# Patient Record
Sex: MALE | Race: WHITE | Employment: UNEMPLOYED | URBAN - METROPOLITAN AREA
[De-identification: names, ages, dates, MRNs, and addresses within clinical notes are randomized per-mention and may not be internally consistent; named-entity substitution may affect disease eponyms.]

---

## 2017-03-06 ENCOUNTER — OFFICE VISIT (OUTPATIENT)
Dept: HEMATOLOGY | Age: 62
End: 2017-03-06

## 2017-03-06 ENCOUNTER — HOSPITAL ENCOUNTER (OUTPATIENT)
Dept: LAB | Age: 62
Discharge: HOME | End: 2017-03-06
Payer: MEDICARE

## 2017-03-06 VITALS
OXYGEN SATURATION: 97 % | BODY MASS INDEX: 34.35 KG/M2 | WEIGHT: 245.38 LBS | TEMPERATURE: 98.3 F | DIASTOLIC BLOOD PRESSURE: 81 MMHG | HEART RATE: 84 BPM | SYSTOLIC BLOOD PRESSURE: 125 MMHG | HEIGHT: 71 IN

## 2017-03-06 DIAGNOSIS — B19.20 COMPENSATED HCV CIRRHOSIS (HCC): Primary | ICD-10-CM

## 2017-03-06 DIAGNOSIS — K74.69 COMPENSATED HCV CIRRHOSIS (HCC): Primary | ICD-10-CM

## 2017-03-06 DIAGNOSIS — B19.20 COMPENSATED HCV CIRRHOSIS (HCC): ICD-10-CM

## 2017-03-06 DIAGNOSIS — K74.69 COMPENSATED HCV CIRRHOSIS (HCC): ICD-10-CM

## 2017-03-06 LAB
ALBUMIN SERPL BCP-MCNC: 3.2 G/DL (ref 3.4–5)
ALBUMIN/GLOB SERPL: 0.8 {RATIO} (ref 0.8–1.7)
ALP SERPL-CCNC: 106 U/L (ref 45–117)
ALT SERPL-CCNC: 42 U/L (ref 16–61)
AMMONIA PLAS-SCNC: 100 UMOL/L (ref 11–32)
ANION GAP BLD CALC-SCNC: 8 MMOL/L (ref 3–18)
AST SERPL W P-5'-P-CCNC: 61 U/L (ref 15–37)
BASOPHILS # BLD AUTO: 0 K/UL (ref 0–0.06)
BASOPHILS # BLD: 1 % (ref 0–2)
BILIRUB DIRECT SERPL-MCNC: 0.7 MG/DL (ref 0–0.2)
BILIRUB SERPL-MCNC: 2.4 MG/DL (ref 0.2–1)
BUN SERPL-MCNC: 8 MG/DL (ref 7–18)
BUN/CREAT SERPL: 9 (ref 12–20)
CALCIUM SERPL-MCNC: 8.1 MG/DL (ref 8.5–10.1)
CHLORIDE SERPL-SCNC: 108 MMOL/L (ref 100–108)
CO2 SERPL-SCNC: 25 MMOL/L (ref 21–32)
CREAT SERPL-MCNC: 0.87 MG/DL (ref 0.6–1.3)
DIFFERENTIAL METHOD BLD: ABNORMAL
EOSINOPHIL # BLD: 0.3 K/UL (ref 0–0.4)
EOSINOPHIL NFR BLD: 6 % (ref 0–5)
ERYTHROCYTE [DISTWIDTH] IN BLOOD BY AUTOMATED COUNT: 14.9 % (ref 11.6–14.5)
GLOBULIN SER CALC-MCNC: 3.9 G/DL (ref 2–4)
GLUCOSE SERPL-MCNC: 115 MG/DL (ref 74–99)
HCT VFR BLD AUTO: 45.4 % (ref 36–48)
HGB BLD-MCNC: 16.3 G/DL (ref 13–16)
INR PPP: 1.3 (ref 0.8–1.2)
LYMPHOCYTES # BLD AUTO: 12 % (ref 21–52)
LYMPHOCYTES # BLD: 0.5 K/UL (ref 0.9–3.6)
MCH RBC QN AUTO: 33.9 PG (ref 24–34)
MCHC RBC AUTO-ENTMCNC: 35.9 G/DL (ref 31–37)
MCV RBC AUTO: 94.4 FL (ref 74–97)
MONOCYTES # BLD: 0.5 K/UL (ref 0.05–1.2)
MONOCYTES NFR BLD AUTO: 13 % (ref 3–10)
NEUTS SEG # BLD: 2.9 K/UL (ref 1.8–8)
NEUTS SEG NFR BLD AUTO: 68 % (ref 40–73)
PLATELET # BLD AUTO: 46 K/UL (ref 135–420)
PMV BLD AUTO: 11 FL (ref 9.2–11.8)
POTASSIUM SERPL-SCNC: 3.7 MMOL/L (ref 3.5–5.5)
PROT SERPL-MCNC: 7.1 G/DL (ref 6.4–8.2)
PROTHROMBIN TIME: 15.4 SEC (ref 11.5–15.2)
RBC # BLD AUTO: 4.81 M/UL (ref 4.7–5.5)
RBC MORPH BLD: ABNORMAL
SODIUM SERPL-SCNC: 141 MMOL/L (ref 136–145)
WBC # BLD AUTO: 4.2 K/UL (ref 4.6–13.2)

## 2017-03-06 PROCEDURE — 80048 BASIC METABOLIC PNL TOTAL CA: CPT | Performed by: NURSE PRACTITIONER

## 2017-03-06 PROCEDURE — 85025 COMPLETE CBC W/AUTO DIFF WBC: CPT | Performed by: NURSE PRACTITIONER

## 2017-03-06 PROCEDURE — 36415 COLL VENOUS BLD VENIPUNCTURE: CPT | Performed by: NURSE PRACTITIONER

## 2017-03-06 PROCEDURE — 85610 PROTHROMBIN TIME: CPT | Performed by: NURSE PRACTITIONER

## 2017-03-06 PROCEDURE — 82107 ALPHA-FETOPROTEIN L3: CPT | Performed by: NURSE PRACTITIONER

## 2017-03-06 PROCEDURE — 82140 ASSAY OF AMMONIA: CPT | Performed by: NURSE PRACTITIONER

## 2017-03-06 PROCEDURE — 80076 HEPATIC FUNCTION PANEL: CPT | Performed by: NURSE PRACTITIONER

## 2017-03-06 PROCEDURE — 87521 HEPATITIS C PROBE&RVRS TRNSC: CPT | Performed by: NURSE PRACTITIONER

## 2017-03-06 RX ORDER — SPIRONOLACTONE 100 MG/1
100 TABLET, FILM COATED ORAL DAILY
Qty: 90 TAB | Refills: 4 | Status: SHIPPED | OUTPATIENT
Start: 2017-03-06 | End: 2017-08-24 | Stop reason: SDUPTHER

## 2017-03-06 RX ORDER — FUROSEMIDE 40 MG/1
40 TABLET ORAL DAILY
Qty: 90 TAB | Refills: 4 | Status: SHIPPED | OUTPATIENT
Start: 2017-03-06 | End: 2017-08-24 | Stop reason: SDUPTHER

## 2017-03-06 NOTE — MR AVS SNAPSHOT
Visit Information Date & Time Provider Department Dept. Phone Encounter #  
 3/6/2017 11:30 AM Amy Delford Homans, NP [facility] [place] [facility] [address] [facility] [id number] Follow-up Instructions Return in about 3 months (around 6/6/2017), or if symptoms worsen or fail to improve. Your Appointments 3/6/2017 11:30 AM  
Follow Up with Patricia Roblero NP [facility] [place] [facility] [place] ([facility]) Appt Note: Chronic Liver Disease [address]  
  
   
 [address] [facility] Upcoming Health Maintenance Date Due DTaP/Tdap/Td series (1 - Tdap) 8/18/1977 FOBT Q 1 YEAR AGE 50-75 8/18/2006 INFLUENZA AGE 9 TO ADULT 8/1/2016 ZOSTER VACCINE AGE 60> 8/18/2016 Allergies as of 3/6/2017  Review Complete On: 3/6/2017 By: Leeanna Trejo Severity Noted Reaction Type Reactions Aspirin  12/12/2013    Other (comments) Bleeding Valium [Diazepam]    Swelling Current Immunizations  Reviewed on 10/19/2013 Name Date Influenza Vaccine  Deferred (Patient Refused) Pneumococcal Vaccine (Unspecified Type) 4/1/2013 Not reviewed this visit You Were Diagnosed With   
  
 Codes Comments Compensated HCV cirrhosis ([facility]    -  Primary ICD-10-CM: B19.20, N12.80 ICD-9-CM: 070.54, 571.5 Vitals BP Pulse Temp Height(growth percentile) Weight(growth percentile) SpO2  
 125/81 84 98.3 °F (36.8 °C) (Tympanic) 5' 11\" (1.803 m) 245 lb 6 oz (111.3 kg) 97% BMI Smoking Status 34.22 kg/m2 Current Every Day Smoker Vitals History BMI and BSA Data Body Mass Index Body Surface Area  
 34.22 kg/m 2 2.36 m 2 Preferred Pharmacy Pharmacy Name Phone [facility] [address] [place] [address] [phone] Your Updated Medication List  
  
   
 This list is accurate as of: 3/6/17 11:23 AM.  Always use your most recent med list.  
  
  
  
  
 furosemide 40 mg tablet Commonly known as:  LASIX Take 1 Tab by mouth daily. Indications: Edema OTHER Take 1 Tab by mouth daily as needed. polyethylene glycol 17 gram/dose powder Commonly known as:  Dresden Clos Take 17 g by mouth daily. rifAXIMin 550 mg tablet Commonly known as:  Cady Magic Take 1 Tab by mouth two (2) times a day. selenium sulfide 2.5 % shampoo Commonly known as:  Macho Marbella Use daily as shampoo. spironolactone 100 mg tablet Commonly known as:  ALDACTONE Take 1 Tab by mouth daily. Indications: Edema Prescriptions Sent to Pharmacy Refills  
 furosemide (LASIX) 40 mg tablet 4 Sig: Take 1 Tab by mouth daily. Indications: Edema Class: Normal  
 Pharmacy: [address] Ph #: [phone] Route: Oral  
 spironolactone (ALDACTONE) 100 mg tablet 4 Sig: Take 1 Tab by mouth daily. Indications: Edema Class: Normal  
 Pharmacy: [address] Ph #: [phone] Route: Oral  
  
Follow-up Instructions Return in about 3 months (around 6/6/2017), or if symptoms worsen or fail to improve. To-Do List   
 03/06/2017 Lab:  AFP WITH AFP-L3%   
  
 03/06/2017 Lab:  AMMONIA   
  
 03/06/2017 Lab:  CBC WITH AUTOMATED DIFF   
  
 03/06/2017 Lab:  HCV RNA BY GABRIELE QL,RFLX TO QT   
  
 03/06/2017 Lab:  HEPATIC FUNCTION PANEL   
  
 03/06/2017 Lab:  METABOLIC PANEL, BASIC   
  
 03/06/2017 Lab:  PROTHROMBIN TIME + INR   
  
 03/06/2017 Imaging:  US ABD LTD   
  
 04/05/2017 GI:  EGD Introducing [facility]! Dear Stephen Mi: Thank you for requesting a Webspy account. Our records indicate that you already have an active Webspy account.   You can access your account anytime at [email or web address] Did you know that you can access your hospital and ER discharge instructions at any time in [employer]? You can also review all of your test results from your hospital stay or ER visit. Additional Information If you have questions, please visit the Frequently Asked Questions section of the [employer] website at [email or web address] Remember, [employer] is NOT to be used for urgent needs. For medical emergencies, dial 911. Now available from your iPhone and Android! Please provide this summary of care documentation to your next provider. If you have any questions after today's visit, please call [phone].

## 2017-03-06 NOTE — PROGRESS NOTES
2 Dawn Fowler MD April G Fernie, NP [address] [place], NP        at [address] [facility], [address] Radha Guan  22.     [phone]     FAX: [phone]    at [address], [address] [facility], [address]     [phone]     FAX: [phone]       Patient Care Team:  MD Raymond Mederos MD as Physician (Dermatology)      Problem List  Date Reviewed: 12/5/2016          Codes Class Noted    Portal hypertensive gastropathy ICD-10-CM: K31.89  ICD-9-CM: 537.89  12/22/2013        Cirrhosis ([facility].) ICD-10-CM: K74.60  ICD-9-CM: 571.5  9/5/2011        Upper GI bleed secondary to NSAID induced gastritis - 9/2010 ICD-10-CM: K92.2  ICD-9-CM: 578.9  9/5/2011        S/P lumbar laminectomy with spinal fusion - 1992 ICD-10-CM: Z98.1  ICD-9-CM: V45.4  9/5/2011        Colon polyps ICD-10-CM: K63.5  ICD-9-CM: 211.3  9/5/2011    Overview Signed 9/5/2011 10:38 AM by Jerzy Fowler MD     Last colonoscopy 9/2010             Esophageal varices ([facility].) ICD-10-CM: I85.00  ICD-9-CM: 456.1  9/5/2011        Thrombocytopenia secondary to cirrhosis ICD-10-CM: D69.6  ICD-9-CM: 287.5  9/5/2011        Chronic hepatitis C genotype 1 ICD-10-CM: B18.2  ICD-9-CM: 070.54  Unknown              Mayra Villagran returns to the [address] for monitoring of chronic HCV with cirrhosis. The active problem list, all pertinent past medical history, medications, liver histology, endoscopic studies, radiologic findings and laboratory findings related to the liver disorder were reviewed with the patient. The patient has esophageal varices without bleeding. His previous UGI bleed was from portal gastropathy. He had an EGD in November 2016 that identified small esophageal varices. Mr. Frank Nick is on step 1 diuretics. His peripheral edema is well controlled. He is currently taking Xifaxan.   He could not tolerate lactulose due to diarrhea. He denies any overt confusion. Mr. Archana Yap  participated in the Astral B clinical trial.  Unfortunately he relapsed 12 weeks post treatment. Mr. Archana Yap completed twenty-four weeks of treatment with Vamshi Simpson with good toleration 13 months ago. He remained HCV RNA undetectable one year post treatment and is considered cured of HCV. The patient has not experienced yellowing of the eyes or skin, itching, dark urine. ALLERGIES:  Allergies   Allergen Reactions    Aspirin Other (comments)     Bleeding      Valium [Diazepam] Swelling     Current Outpatient Prescriptions   Medication Sig    rifAXIMin (XIFAXAN) 550 mg tablet Take 1 Tab by mouth two (2) times a day.  polyethylene glycol (MIRALAX) 17 gram/dose powder Take 17 g by mouth daily.  OTHER Take 1 Tab by mouth daily as needed.  furosemide (LASIX) 40 mg tablet Take 40 mg by mouth daily. Indications: EDEMA    spironolactone (ALDACTONE) 100 mg tablet Take 100 mg by mouth daily. Indications: EDEMA    selenium sulfide (SELSUN) 2.5 % shampoo Use daily as shampoo. No current facility-administered medications for this visit. SYSTEM REVIEW NOT RELATED TO LIVER DISEASE OR REVIEWED ABOVE:  Constitution systems: Negative for fever, chills, weight loss. Positive for weight gain. Eyes: Negative for visual changes. ENT: Negative for sore throat, painful swallowing. Respiratory: Negative for cough, hemoptysis, SOB. Cardiology: Negative for chest pain, palpitations. GI:  Negative for constipation or diarrhea. : Negative for urinary frequency, dysuria, hematuria, nocturia. Skin: Negative for rash. Hematology: Negative for easy bruising, blood clots. Musculo-skelatal: Negative for back pain, muscle pain, weakness. Neurologic: Negative for headaches, dizziness, vertigo, memory problems not related to HE. Psychology: Negative for anxiety, depression.      FAMILY/SOCIAL HISTORY:  The patient is . There are 3 children. The patient smokes tobacco. The patient has been abstinent from alcohol since 2007. The patient is currently on disability. PHYSICAL EXAMINATION:    Visit Vitals    /81    Pulse 84    Temp 98.3 °F (36.8 °C) (Tympanic)    Ht 5' 11\" (1.803 m)    Wt 245 lb 6 oz (111.3 kg)    SpO2 97%    BMI 34.22 kg/m2       General: No acute distress. Eyes: Sclera anicteric. ENT: No oral lesions, thyroid normal.  Nodes: No adenopathy. Skin: Spider angiomata on chest, palmar erythema. Respiratory: Lungs clear to auscultation. Cardiovascular: Regular heart rate, no murmurs, no JVD. Abdomen: Soft non-tender, liver enlarged. Spleen not palpable. No obvious ascites. Umbilical hernia present. Extremities: Trace bilateral pedal edema. No muscle wasting. No gross arthritic changes. Neurologic: Alert and oriented, cranial nerves grossly intact, no asterixsis.     LABORATORY STUDIES:    [facility] [place] of [address] [place] [address] & Units 12/5/2016 11/15/2016 9/1/2016   WBC 4.6 - 13.2 K/uL 5.5 4.1 (L) 5.0   ANC 1.8 - 8.0 K/UL 3.9 2.8 3.5   HGB 13.0 - 16.0 g/dL 14.3 15.0 14.8    - 420 K/uL 58 (L) 41 (L) 58 (L)   INR 0.8 - 1.2   1.3 (H)  1.2   AST 15 - 37 U/L 79 (H)  93 (H)   ALT 16 - 61 U/L 46  54   Alk Phos 45 - 117 U/L 100  79   Bili, Total 0.2 - 1.0 MG/DL 2.0 (H)  2.2 (H)   Bili, Direct 0.0 - 0.2 MG/DL 0.7 (H)  0.6 (H)   Albumin 3.4 - 5.0 g/dL 3.0 (L)  3.4   BUN 7.0 - 18 MG/DL 9  12   Creat 0.6 - 1.3 MG/DL 1.14  0.92   Na 136 - 145 mmol/L 141  137   K 3.5 - 5.5 mmol/L 4.5  4.0   Cl 100 - 108 mmol/L 105  103   CO2 21 - 32 mmol/L 29  27   Glucose 74 - 99 mg/dL 105 (H)  89   Ammonia 11 - 32 UMOL/L 66 (H)       [facility] Latest Ref Rng & Units 6/15/2016   WBC 4.6 - 13.2 K/uL 4.2 (L)   ANC 1.8 - 8.0 K/UL 3.0   HGB 13.0 - 16.0 g/dL 14.8    - 420 K/uL 43 (L)   INR 0.8 - 1.2   1.4 (H)   AST 15 - 37 U/L 50 (H)   ALT 16 - 61 U/L 42   Alk Phos 45 - 117 U/L 86 Bili, Total 0.2 - 1.0 MG/DL 2.4 (H)   Bili, Direct 0.0 - 0.2 MG/DL 0.5 (H)   Albumin 3.4 - 5.0 g/dL 3.2 (L)   BUN 7.0 - 18 MG/DL 10   Creat 0.6 - 1.3 MG/DL 0.89   Na 136 - 145 mmol/L 143   K 3.5 - 5.5 mmol/L 4.3   Cl 100 - 108 mmol/L 109 (H)   CO2 21 - 32 mmol/L 26   Glucose 74 - 99 mg/dL 95   Ammonia 11 - 32 UMOL/L 73 (H)       SEROLOGIES:  12/10:  HAV total positive, HB surface antigen negative, anti-HB surface negative, HCV genotype 1, HCV RNA log 5.5,    7/2011: Ferritin 73, iron saturation 57%. LIVER HISTOLOGY:  Not performed    ENDOSCOPIC PROCEDURES:  9/2010:  Colonoscopy while hospitalized in [facility] - reported to demonstrate colonic polyps. 9/2010:  EGD while hospitalized in [facility] - reported to demonstrate severe gastritis from NSAIDs with small esophageal varices. 2/2011: EGD by [facility]. Esophageal varicies. Banding performed. 11/2011:  EGD by [facility]. Severe portal gastropathy. No varices. 4/2012:  EGD by [facility]:  Grade 2 esophageal varices. Banding performed. Severe hypertensive portal gastropathy. 8/2012:  EGD per [facility]. Small esophageal varices. Severe portal hypertensive gastropathy, gastritis of the antrum, no gastric varices. Repeat in 6 months. 10/2013. EGD by Dr Rozina Singer. Small varices with stigmata of bleeding. Portal gastropathy. 07/2014:  EGD per [facility]. 2 medium esophageal varices. Banding performed. Moderate portal hypertensive gastropathy. Repeat in one year. 9/1/2015: EGD per [facility]. Duodenal ulcer present. H. Pylori negative. Portal hypertensive gastropathy. Esophageal varices present. Four bands placed. Repeat in 6 months. 11/2015:  EGD per Dr. Jaci Nyhan. Moderate portal gastropathy. Grade 1 esophageal varices. No gastric varices noted. 11/2015:  Colonoscopy per Dr. Jaci Nyhan. Mild diverticulosis. 11/2016:  EGD per Dr. Nadia Laws. Small esophageal varices. Severe hypertensive portal gastropathy. Repeat in 6 months. RADIOLOGY:  11/2010: Ultrasound of the liver. Echogenic consistent with cirrhosis. No liver mass lesions. No ascites. 2/2011: Ultrasound of the liver. Changes consistent with cirrhosis. No liver mass lesions. No ascites. No portal vein thrombosis. 11/2011: Ultrasound of the liver. Echogenic consistent with chronic liver disease, cirrhosis. No liver mass lesions. No ascites. 04/12:  CT of abdomen and pelvis - hepatic cirrhosis with portal hypertension with scattered upper abdominal collateralzation and varices. Likely recanalization of the umbilical artery. No mass noted. 1/2015:  Ultrasound of liver. Echogenic consistent with cirrhosis. No liver mass lesions. No dilated bile ducts. No ascites. 8/2015:  CT of the abdomen and pelvis. Coarsened, micronodular surface of the liver with widening of the interlobar fissure. No intrahepatic biliary ductal dilation. Stable enlargement of the portal vein with recanalization of the umbilical vein. Paraesophageal varices. Grossly stable splenomegaly  1/2016:  Ultrasound of liver. Echogenic consistent with cirrhosis. No liver mass lesions. No dilated bile ducts. No ascites. 11/2016:  Ultrasound of the liver. Echogenic consistent with cirrhosis. No liver mass lesions. No dilated bile ducts. No ascites. OTHER TESTING:  Not performed    ASSESSMENT AND PLAN:    1. Chronic HCV, genotype 1 with cirrhosis. CBC, BMP, hepatic panel, and PT/INR ordered today. 2. Liver transmaminses are elevated. Liver function is depressed. The platelet count is depressed. 3. HCV treatment. Mr. Susan Lyons completed twenty-four weeks of treatment with Ines Chill with good toleration 13 months ago. He remained HCV RNA undetectable one year post treatment. 4. Upper GI bleed appears to have been due to portal gastropathy. 5. Hepatic encephalopathy. I have ordered an ammonia level today. The patient was instructed not to operate a motor vehicle because of HE which poses an increased risk for MVA.       6. Lower extremity. Continue step 1 diuretics. I have spoken to him about the importance of salt restrictions. 7. The patient was counseled regarding alcohol consumption. 8. Vaccination for viral hepatitis B is recommended since the patient has no serologic evidence of previous exposure or vaccination with immunity. 9. Vaccination for viral hepatitis A is not required. The patient has serologic evidence of prior exposure or vaccination with immunity. 10. [address] screening will be performed. AFP ordered today. His next abdominal ultrasound is due in June 2017. 11. Thrombocytopenia secondary to cirrhosis from chronic HCV. No treatment necessary. 12. Anemia is probably from chronic GI blood loss from portal hypertension and iron deficiency. 15.  Screening for varices. EGD performed in 11/2016 positive for small varices. I have ordered a repeat EGD with MAC today. [facility] [address] in 12 weeks. 15. All of the above issues were discussed with the patient. All questions were answered. The patient expressed a clear understanding of the above. 12. Dr. Rodri Mcqueen was available during this visit.        Rosamaria Marcano, MEJIA  [facility] [place] [address], [address] [place] [address], [address] [place] [address] Street [address] [phone]

## 2017-03-08 LAB
AFP L3 MFR SERPL: 6.1 % (ref 0–9.9)
AFP SERPL-MCNC: 8 NG/ML (ref 0–8)
HCV RNA SERPL QL NAA+PROBE: NEGATIVE

## 2017-03-20 ENCOUNTER — HOSPITAL ENCOUNTER (OUTPATIENT)
Dept: ULTRASOUND IMAGING | Age: 62
Discharge: HOME | End: 2017-03-20
Attending: NURSE PRACTITIONER
Payer: MEDICARE

## 2017-03-20 DIAGNOSIS — B19.20 COMPENSATED HCV CIRRHOSIS (HCC): ICD-10-CM

## 2017-03-20 DIAGNOSIS — K74.69 COMPENSATED HCV CIRRHOSIS (HCC): ICD-10-CM

## 2017-03-20 PROCEDURE — 76705 ECHO EXAM OF ABDOMEN: CPT

## 2017-03-21 ENCOUNTER — DOCUMENTATION ONLY (OUTPATIENT)
Dept: HEMATOLOGY | Age: 62
End: 2017-03-21

## 2017-03-21 NOTE — PROGRESS NOTES
Attempted to call with ultrasound results. Message states voicemail box not set up. Ultrasound is stable without ascites or mass. F/U as scheduled.

## 2017-04-25 ENCOUNTER — ANESTHESIA EVENT (OUTPATIENT)
Dept: ENDOSCOPY | Age: 62
End: 2017-04-25
Payer: MEDICARE

## 2017-04-25 ENCOUNTER — ANESTHESIA (OUTPATIENT)
Dept: ENDOSCOPY | Age: 62
End: 2017-04-25
Payer: MEDICARE

## 2017-04-25 ENCOUNTER — HOSPITAL ENCOUNTER (OUTPATIENT)
Age: 62
Setting detail: OUTPATIENT SURGERY
Discharge: HOME | End: 2017-04-25
Attending: INTERNAL MEDICINE | Admitting: INTERNAL MEDICINE
Payer: MEDICARE

## 2017-04-25 VITALS
RESPIRATION RATE: 16 BRPM | OXYGEN SATURATION: 99 % | WEIGHT: 253.6 LBS | TEMPERATURE: 98.6 F | SYSTOLIC BLOOD PRESSURE: 107 MMHG | HEIGHT: 71 IN | BODY MASS INDEX: 35.5 KG/M2 | DIASTOLIC BLOOD PRESSURE: 69 MMHG | HEART RATE: 71 BPM

## 2017-04-25 LAB
BASOPHILS # BLD AUTO: 0 K/UL (ref 0–0.06)
BASOPHILS # BLD: 1 % (ref 0–2)
DIFFERENTIAL METHOD BLD: ABNORMAL
EOSINOPHIL # BLD: 0.2 K/UL (ref 0–0.4)
EOSINOPHIL NFR BLD: 4 % (ref 0–5)
ERYTHROCYTE [DISTWIDTH] IN BLOOD BY AUTOMATED COUNT: 15.1 % (ref 11.6–14.5)
HCT VFR BLD AUTO: 41.9 % (ref 36–48)
HGB BLD-MCNC: 15 G/DL (ref 13–16)
LYMPHOCYTES # BLD AUTO: 13 % (ref 21–52)
LYMPHOCYTES # BLD: 0.6 K/UL (ref 0.9–3.6)
MCH RBC QN AUTO: 33.8 PG (ref 24–34)
MCHC RBC AUTO-ENTMCNC: 35.8 G/DL (ref 31–37)
MCV RBC AUTO: 94.4 FL (ref 74–97)
MONOCYTES # BLD: 0.5 K/UL (ref 0.05–1.2)
MONOCYTES NFR BLD AUTO: 11 % (ref 3–10)
NEUTS SEG # BLD: 3.5 K/UL (ref 1.8–8)
NEUTS SEG NFR BLD AUTO: 71 % (ref 40–73)
PLATELET # BLD AUTO: 45 K/UL (ref 135–420)
PLATELET COMMENTS,PCOM: ABNORMAL
PMV BLD AUTO: 10 FL (ref 9.2–11.8)
RBC # BLD AUTO: 4.44 M/UL (ref 4.7–5.5)
RBC MORPH BLD: ABNORMAL
WBC # BLD AUTO: 4.8 K/UL (ref 4.6–13.2)

## 2017-04-25 PROCEDURE — 74011250636 HC RX REV CODE- 250/636

## 2017-04-25 PROCEDURE — 76040000019: Performed by: INTERNAL MEDICINE

## 2017-04-25 PROCEDURE — 74011000250 HC RX REV CODE- 250: Performed by: INTERNAL MEDICINE

## 2017-04-25 PROCEDURE — 74011250636 HC RX REV CODE- 250/636: Performed by: INTERNAL MEDICINE

## 2017-04-25 PROCEDURE — 85025 COMPLETE CBC W/AUTO DIFF WBC: CPT | Performed by: SPECIALIST

## 2017-04-25 PROCEDURE — 36415 COLL VENOUS BLD VENIPUNCTURE: CPT | Performed by: SPECIALIST

## 2017-04-25 PROCEDURE — 76060000031 HC ANESTHESIA FIRST 0.5 HR: Performed by: INTERNAL MEDICINE

## 2017-04-25 RX ORDER — DEXTROMETHORPHAN/PSEUDOEPHED 2.5-7.5/.8
1.2 DROPS ORAL
Status: CANCELLED | OUTPATIENT
Start: 2017-04-25

## 2017-04-25 RX ORDER — SODIUM CHLORIDE 9 MG/ML
125 INJECTION, SOLUTION INTRAVENOUS CONTINUOUS
Status: DISCONTINUED | OUTPATIENT
Start: 2017-04-25 | End: 2017-04-25 | Stop reason: HOSPADM

## 2017-04-25 RX ORDER — MIDAZOLAM HYDROCHLORIDE 1 MG/ML
.25-5 INJECTION, SOLUTION INTRAMUSCULAR; INTRAVENOUS
Status: CANCELLED | OUTPATIENT
Start: 2017-04-25 | End: 2017-04-25

## 2017-04-25 RX ORDER — FENTANYL CITRATE 50 UG/ML
100 INJECTION, SOLUTION INTRAMUSCULAR; INTRAVENOUS
Status: CANCELLED | OUTPATIENT
Start: 2017-04-25 | End: 2017-04-25

## 2017-04-25 RX ORDER — FLUMAZENIL 0.1 MG/ML
0.2 INJECTION INTRAVENOUS
Status: CANCELLED | OUTPATIENT
Start: 2017-04-25 | End: 2017-04-25

## 2017-04-25 RX ORDER — SODIUM CHLORIDE 0.9 % (FLUSH) 0.9 %
5-10 SYRINGE (ML) INJECTION EVERY 8 HOURS
Status: CANCELLED | OUTPATIENT
Start: 2017-04-25 | End: 2017-04-25

## 2017-04-25 RX ORDER — ATROPINE SULFATE 0.1 MG/ML
0.5 INJECTION INTRAVENOUS
Status: CANCELLED | OUTPATIENT
Start: 2017-04-25 | End: 2017-04-25

## 2017-04-25 RX ORDER — EPINEPHRINE 0.1 MG/ML
1 INJECTION INTRACARDIAC; INTRAVENOUS
Status: CANCELLED | OUTPATIENT
Start: 2017-04-25 | End: 2017-04-25

## 2017-04-25 RX ORDER — LIDOCAINE HYDROCHLORIDE 20 MG/ML
INJECTION, SOLUTION EPIDURAL; INFILTRATION; INTRACAUDAL; PERINEURAL AS NEEDED
Status: DISCONTINUED | OUTPATIENT
Start: 2017-04-25 | End: 2017-04-25 | Stop reason: HOSPADM

## 2017-04-25 RX ORDER — DIPHENHYDRAMINE HYDROCHLORIDE 50 MG/ML
50 INJECTION, SOLUTION INTRAMUSCULAR; INTRAVENOUS ONCE
Status: CANCELLED | OUTPATIENT
Start: 2017-04-25 | End: 2017-04-25

## 2017-04-25 RX ORDER — SODIUM CHLORIDE 0.9 % (FLUSH) 0.9 %
5-10 SYRINGE (ML) INJECTION AS NEEDED
Status: CANCELLED | OUTPATIENT
Start: 2017-04-25 | End: 2017-04-25

## 2017-04-25 RX ORDER — PROPRANOLOL HYDROCHLORIDE 10 MG/1
10 TABLET ORAL 2 TIMES DAILY
Qty: 60 TAB | Refills: 3 | Status: SHIPPED | OUTPATIENT
Start: 2017-04-25 | End: 2018-07-24

## 2017-04-25 RX ORDER — NALOXONE HYDROCHLORIDE 0.4 MG/ML
0.4 INJECTION, SOLUTION INTRAMUSCULAR; INTRAVENOUS; SUBCUTANEOUS
Status: CANCELLED | OUTPATIENT
Start: 2017-04-25 | End: 2017-04-25

## 2017-04-25 RX ORDER — PROPOFOL 10 MG/ML
INJECTION, EMULSION INTRAVENOUS AS NEEDED
Status: DISCONTINUED | OUTPATIENT
Start: 2017-04-25 | End: 2017-04-25 | Stop reason: HOSPADM

## 2017-04-25 RX ADMIN — PROPOFOL 140 MG: 10 INJECTION, EMULSION INTRAVENOUS at 11:50

## 2017-04-25 RX ADMIN — LIDOCAINE HYDROCHLORIDE 100 MG: 20 INJECTION, SOLUTION EPIDURAL; INFILTRATION; INTRACAUDAL; PERINEURAL at 11:47

## 2017-04-25 RX ADMIN — SODIUM CHLORIDE 125 ML/HR: 900 INJECTION, SOLUTION INTRAVENOUS at 10:57

## 2017-04-25 RX ADMIN — PROPOFOL 30 MG: 10 INJECTION, EMULSION INTRAVENOUS at 11:53

## 2017-04-25 NOTE — DISCHARGE INSTRUCTIONS
DISCHARGE SUMMARY from Nurse    The following personal items are in your possession at time of discharge:    Dental Appliances: None        Home Medications: None  Jewelry: None  Clothing: Shirt, Undergarments, Footwear, Pants, Jacket/Coat, Socks (locker 15)  Other Valuables:  (states Dennie Loveless has his wallet)             PATIENT INSTRUCTIONS:    After general anesthesia or intravenous sedation, for 24 hours or while taking prescription Narcotics:  · Limit your activities  · Do not drive and operate hazardous machinery  · Do not make important personal or business decisions  · Do  not drink alcoholic beverages  · If you have not urinated within 8 hours after discharge, please contact your surgeon on call. Report the following to your surgeon:  · Excessive pain, swelling, redness or odor of or around the surgical area  · Temperature over 100.5  · Nausea and vomiting lasting longer than 4 hours or if unable to take medications  · Any signs of decreased circulation or nerve impairment to extremity: change in color, persistent  numbness, tingling, coldness or increase pain  · Any questions        What to do at Home:  Return to office as scheduled    If you experience any of the following symptoms heavy bleeding, difficulty swallowing, fevers, severe pain, please follow up with dr Naman Villa. *  Please give a list of your current medications to your Primary Care Provider. *  Please update this list whenever your medications are discontinued, doses are      changed, or new medications (including over-the-counter products) are added. *  Please carry medication information at all times in case of emergency situations. These are general instructions for a healthy lifestyle:    No smoking/ No tobacco products/ Avoid exposure to second hand smoke    Surgeon General's Warning:  Quitting smoking now greatly reduces serious risk to your health.     Obesity, smoking, and sedentary lifestyle greatly increases your risk for illness    A healthy diet, regular physical exercise & weight monitoring are important for maintaining a healthy lifestyle    You may be retaining fluid if you have a history of heart failure or if you experience any of the following symptoms:  Weight gain of 3 pounds or more overnight or 5 pounds in a week, increased swelling in our hands or feet or shortness of breath while lying flat in bed. Please call your doctor as soon as you notice any of these symptoms; do not wait until your next office visit. Recognize signs and symptoms of STROKE:    F-face looks uneven    A-arms unable to move or move unevenly    S-speech slurred or non-existent    T-time-call 911 as soon as signs and symptoms begin-DO NOT go       Back to bed or wait to see if you get better-TIME IS BRAIN. Warning Signs of HEART ATTACK     Call 911 if you have these symptoms:   Chest discomfort. Most heart attacks involve discomfort in the center of the chest that lasts more than a few minutes, or that goes away and comes back. It can feel like uncomfortable pressure, squeezing, fullness, or pain.  Discomfort in other areas of the upper body. Symptoms can include pain or discomfort in one or both arms, the back, neck, jaw, or stomach.  Shortness of breath with or without chest discomfort.  Other signs may include breaking out in a cold sweat, nausea, or lightheadedness. Don't wait more than five minutes to call 911 - MINUTES MATTER! Fast action can save your life. Calling 911 is almost always the fastest way to get lifesaving treatment. Emergency Medical Services staff can begin treatment when they arrive -- up to an hour sooner than if someone gets to the hospital by car. The discharge information has been reviewed with the patient and caregiver. The patient and caregiver verbalized understanding.     Discharge medications reviewed with the patient and caregiver and appropriate educational materials and side effects teaching were provided. [facility] Sparks MD, Ro Banks, [facility]     April MEJIA Harding PA-C Rachell Bollman, MD, MD Rosamaria Cruz NP Melene Ide, NP Laan [facility], [address] [facility], [address]     [phone]     FAX: [address] [place] [address], [address] [place] [address], [address]     [phone]     FAX: [phone]           ENDOSCOPY DISCHARGE INSTRUCTIONS      Socorro Joseph  1955  Date: 4/25/2017    DISCOMFORT:  Use lozenges or warm salt water gargle for sore thoat  Apply warm compress to IV site if red. If redness or soreness persists call the office. You may experience gas and bloating. Walking and belching will help relieve this. You may experience chest discomfort or pressure especially if banding of esophageal varices was performed. DIET:  You may resume your normal diet. ACTIVITY:  Spend the remainder of the day resting. Avoid any strenuous activity. You may not operate a vehicle for 12 hours. You may not engage in an occupation involving machinery or appliances for rest of today. Avoid making any critical or financial decisions for at least 24 hour. Call the [address] [facility] if you have any of the following:  Increasing chest or abdominal pain, nausea, vomiting, vomiting blood, abdominal distension or swelling, fever or chills, bloody discharge from nose or mouth or shortness of breath. Follow-up Instructions:  Call Dr. Tiana Montaño for any questions or problems at the phone number listed above. If a biopsy was performed, you will be contacted by the office staff or Dr Tiana Montaño within 1 week. If you have not heard from us by then you may call the office at the phone number listed above to inquire about the results.     ENDOSCOPY FINDINGS:  Your endoscopy demonstrated esophageal varices and stomach redness and swelling typical of cirrhosis. DISCHARGE SUMMARY from the Nurse: The following personal items collected during your admission are returned to you:   Dental Appliance: Dental Appliances: None  Vision:    Hearing Aid:    Jewelry: Jewelry: None  Clothing: Clothing: Shirt, Undergarments, Footwear, Pants, Jacket/Coat, Socks (locker 15)  Other Valuables:  Other Valuables:  (states zuhair has his wallet)  Valuables sent to safe:        Patient armband removed and shredded

## 2017-04-25 NOTE — H&P
PRE-PROCEDURE NOTE - EGD    H and P from last office visit reviewed. Allergies reviewed. Out-patient medicaton list reviewed. Patient Active Problem List   Diagnosis Code    Chronic hepatitis C genotype 1 B18.2    [facility] K74.60    Upper GI bleed secondary to NSAID induced gastritis - 9/2010 K92.2    S/P lumbar laminectomy with spinal fusion - 1992 Z98.1    Colon polyps K63.5    Esophageal varices (H[facility]) I85.00    Thrombocytopenia secondary to cirrhosis D69.6    Portal hypertensive gastropathy K31.89       EGD to assess for esophageal varices. The risks of the procedure were discussed with the patient. These included reaction to anesthesia, pain, perforation and bleeding. All questions were answered. The patient wishes to proceed with the procedure. PHYSICAL EXAMINATION:  VS: per nursing note  General: No acute distress. Eyes: Sclera anicteric. ENT: No oral lesions. Thyroid normal.  Nodes: No adenopathy. Skin: No spider angiomata. No jaundice. No palmar erythema. Respiratory: Lungs clear to auscultation. Cardiovascular: Regular heart rate. No murmurs. No JVD. Abdomen: Soft non-tender, liver size normal to percussion/palpation. Spleen not palpable. No obvious ascites. Extremities: No edema. No muscle wasting. No gross arthritic changes. Neurologic: Alert and oriented. Cranial nerves grossly intact. No asterixis.       MOST RECENT LABORATORY STUDIES:  Lab Results   Component Value Date/Time    WBC 4.8 04/25/2017 10:43 AM    WBC 10.2 06/20/2012 12:00 AM    HGB 15.0 04/25/2017 10:43 AM    HCT 41.9 04/25/2017 10:43 AM    PLATELET 45 30/86/4605 10:43 AM    MCV 94.4 04/25/2017 10:43 AM     Lab Results   Component Value Date/Time    INR 1.3 03/06/2017 11:46 AM    INR 1.3 12/05/2016 02:04 PM    INR 1.2 09/01/2016 04:03 PM    Prothrombin time 15.4 03/06/2017 11:46 AM    Prothrombin time 15.7 12/05/2016 02:04 PM    Prothrombin time 14.8 09/01/2016 04:03 PM       ASSESSMENT AND PLAN:  EGD to assess for esophageal varices. Conscious sedation with fentanyl and versed.           Radha Harrison MD  [facility] [place] of [address], [address], [address]  [phone]

## 2017-04-25 NOTE — PROCEDURES
UPPER ENDOSCOPY PROCEDURE NOTE    Jailene Mancia  1955    INDICATION: Cirrhosis. Screening for esophageal varices with variceal ligation if  indicated. : Radha Harrison MD    ASSISTANT: NONE    ANESTHESIA/SEDATION: Per MAC ANESTHESIA    PROCEDURE DESCRIPTION:  Inf[employer] consent was obtained from the patient for the procedure. All risks and benefits of the procedure explained. The patient was taken to the endoscopy suite and placed in the left lateral decubitus position. Following sequential administration of sedation to doses as indicated above the endoscope was inserted into the mouth and advanced under direct vision to the second portion of the duodenum. Careful inspection of upper gastrointestinal tract was made as the endoscope was inserted and withdrawn. Retroflexion of the endoscope to view of the cardia of the stomach was performed. The findings and interventions are described below. FINDINGS:  Esophagus:  Small esophageal varices. No banding performed. Mild distal esophagitis. Stomach: Severe portal hypertensive gastropathy of the entire stomach. No gastric varices identified. Duodenum: Normal bulb and second portion    INTERVENTION: None. COMPLICATIONS: None. The patient tolerated the procedure well. EBL: Negligible. RECOMMENDATIONS:  Observe until discharge parameters are achieved. May resume previous diet. Repeat endoscopy to reassess varices and need for banding in 1 year. Begin propranolol 10 mg twice a day for prevention of bleeding from varices. Follow-up [facility] office as scheduled.       Radha Harrison MD  4/25/2017  11:55 AM

## 2017-04-25 NOTE — IP AVS SNAPSHOT
Ashia Feng 
 
 
 [address] [place] [id number] 
[phone] Patient: Agusto Zamora MRN: [id number] Tosin Sepulveda You are allergic to the following Allergen Reactions Aspirin Other (comments) Bleeding Valium (Diazepam) Swelling Recent Documentation Height Weight BMI Smoking Status 1.803 m 115 kg 35.37 kg/m2 Current Every Day Smoker Unresulted Labs Order Current Status CBC WITH AUTOMATED DIFF Preliminary result Emergency Contacts Name Discharge Info Relation Home Work Mobile Erick Grimes CAREGIVER [3] Daughter [21]   [phone] Srinath Benson DISCHARGE CAREGIVER [3] Other Relative [6] [phone] About your hospitalization You were admitted on:  April 25, 2017 [facility] were discharged on:  April 25, 2017 Unit phone number:  [phone] Why you were hospitalized Your primary diagnosis was:  Not on File Providers Seen During Your Hospitalizations Provider Role Specialty Primary office phone Cathy Sanchez MD Attending Provider Hepatology [phone] Your Primary Care Physician (PCP) Primary Care Physician Office Phone Office Fax Phyllis Villanueva [phone] Follow-up Information Follow up With Details Comments Contact Info Bob Aguilera MD   [address] [place] [address] 
[phone] Current Discharge Medication List  
  
START taking these medications Dose & Instructions Dispensing Information Comments Morning Noon Evening Bedtime  
 propranolol 10 mg tablet Commonly known as:  INDERAL Your last dose was: Your next dose is:    
   
   
 Dose:  10 mg Take 1 Tab by mouth two (2) times a day. Quantity:  60 Tab Refills:  3 CONTINUE these medications which have NOT CHANGED Dose & Instructions Dispensing Information Comments Morning Noon Evening Bedtime  
 furosemide 40 mg tablet Commonly known as:  LASIX Your last dose was: Your next dose is:    
   
   
 Dose:  40 mg Take 1 Tab by mouth daily. Indications: Edema Quantity:  90 Tab Refills:  4 OTHER Your last dose was: Your next dose is:    
   
   
 Dose:  1 Tab Take 1 Tab by mouth daily as needed. Refills:  0  
     
   
   
   
  
 polyethylene glycol 17 gram/dose powder Commonly known as:  Meridee Rouge Your last dose was: Your next dose is:    
   
   
 Dose:  17 g Take 17 g by mouth daily. Quantity:  850 g Refills:  11  
     
   
   
   
  
 rifAXIMin 550 mg tablet Commonly known as:  Gwynneth Ro Your last dose was: Your next dose is:    
   
   
 Dose:  550 mg Take 1 Tab by mouth two (2) times a day. Quantity:  60 Tab Refills:  11  
     
   
   
   
  
 selenium sulfide 2.5 % shampoo Commonly known as:  Pearla Onaway Your last dose was: Your next dose is:    
   
   
 Use daily as shampoo. Quantity:  1 Bottle Refills:  12  
     
   
   
   
  
 spironolactone 100 mg tablet Commonly known as:  ALDACTONE Your last dose was: Your next dose is:    
   
   
 Dose:  100 mg Take 1 Tab by mouth daily. Indications: Edema Quantity:  90 Tab Refills:  4 Where to Get Your Medications These medications were sent to [address] [place] [address] Phone:  [phone]  
  propranolol 10 mg tablet Discharge Instructions DISCHARGE SUMMARY from Nurse The following personal items are in your possession at time of discharge: 
 
Dental Appliances: None Home Medications: None Jewelry: None Clothing: Shirt, Undergarments, Footwear, Pants, Jacket/Coat, Socks (locker 15) Other Valuables:  (states zuhair has his wallet) PATIENT INSTRUCTIONS: 
 
 
F-face looks uneven A-arms unable to move or move unevenly S-speech slurred or non-existent T-time-call 911 as soon as signs and symptoms begin-DO NOT go Back to bed or wait to see if you get better-TIME IS BRAIN. Warning Signs of HEART ATTACK Call 911 if you have these symptoms: 
? Chest discomfort. Most heart attacks involve discomfort in the center of the chest that lasts more than a few minutes, or that goes away and comes back. It can feel like uncomfortable pressure, squeezing, fullness, or pain. ? Discomfort in other areas of the upper body. Symptoms can include pain or discomfort in one or both arms, the back, neck, jaw, or stomach. ? Shortness of breath with or without chest discomfort. ? Other signs may include breaking out in a cold sweat, nausea, or lightheadedness. Don't wait more than five minutes to call [address]! Fast action can save your life. Calling 911 is almost always the fastest way to get lifesaving treatment. Emergency Medical Services staff can begin treatment when they arrive  up to an hour sooner than if someone gets to the hospital by car. The discharge information has been reviewed with the patient and caregiver. The patient and caregiver verbalized understanding. Discharge medications reviewed with the patient and caregiver and appropriate educational materials and side effects teaching were provided. [address] [place] [address] [address]    Cathy Sanchez MD, [facility] 
 Rueben Lefevre, NP Jodi Favre, PA-C Tommi Core, MD, MD Rosamaria Martin NP Laurice Perry, NP [address] [facility], [address] Radha Palacios  22. 
   [phone] FAX: [address], [address] [facility], [address] [place] [address] - [address] 
   [phone] FAX: [phone] ENDOSCOPY DISCHARGE INSTRUCTIONS Marvin Marin 1955 Date: 4/25/2017 DISCOMFORT: 
Use lozenges or warm salt water gargle for sore thoat Apply warm compress to IV site if red. If redness or soreness persists call the office. You may experience gas and bloating. Walking and belching will help relieve this. You may experience chest discomfort or pressure especially if banding of esophageal varices was performed. DIET: 
You may resume your normal diet. ACTIVITY: 
Spend the remainder of the day resting. Avoid any strenuous activity. You may not operate a vehicle for 12 hours. You may not engage in an occupation involving machinery or appliances for rest of today. Avoid making any critical or financial decisions for at least 24 hour. Call the [facility] [phone] if you have any of the following: 
Increasing chest or abdominal pain, nausea, vomiting, vomiting blood, abdominal distension or swelling, fever or chills, bloody discharge from nose or mouth or shortness of breath. Follow-up Instructions: 
Call Dr. Reilly Tompkins for any questions or problems at the phone number listed above. If a biopsy was performed, you will be contacted by the office staff or Dr Reilly Tompkins within 1 week. If you have not heard from us by then you may call the office at the phone number listed above to inquire about the results. ENDOSCOPY FINDINGS: 
Your endoscopy demonstrated esophageal varices and stomach redness and swelling typical of cirrhosis. DISCHARGE SUMMARY from the Nurse: The following personal items collected during your admission are returned to you:  
Dental Appliance: Dental Appliances: None Vision:   
Hearing Aid:   
Jewelry: Jewelry: None Clothing: Clothing: Shirt, Undergarments, Footwear, Pants, Jacket/Coat, Socks (locker 15) Other Valuables: Other Valuables:  (states zuhair has his wallet) Valuables sent to safe:   
 
 
Patient armband removed and shredded Discharge Orders None Introducing [facility]! Dear Anastacia Araujo: Thank you for requesting a [employer] account. Our records indicate that you already have an active [employer] account. You can access your account anytime at [email or web address] Did you know that you can access your hospital and ER discharge instructions at any time in [employer]? You can also review all of your test results from your hospital stay or ER visit. Additional Information If you have questions, please visit the Frequently Asked Questions section of the [employer] website at [email or web address] Remember, [employer] is NOT to be used for urgent needs. For medical emergencies, dial 911. Now available from your iPhone and Android! General Information Please provide this summary of care documentation to your next provider. Patient Signature:  ____________________________________________________________ Date:  ____________________________________________________________  
  
Antwan Sapp Provider Signature:  ____________________________________________________________ Date:  ____________________________________________________________

## 2017-04-25 NOTE — ANESTHESIA PREPROCEDURE EVALUATION
Anesthetic History   No history of anesthetic complications     Pertinent negatives: No PONV       Review of Systems / Medical History  Patient summary reviewed, nursing notes reviewed and pertinent labs reviewed    Pulmonary          Smoker (1/4 ppd , Did smoke today, was not told not to. )    Pertinent negatives: No COPD and asthma     Neuro/Psych   Within defined limits           Cardiovascular  Within defined limits              Pertinent negatives: No hypertension, past MI and CAD       GI/Hepatic/Renal     GERD: well controlled      Liver disease  Pertinent negatives: No renal disease   Endo/Other  Within defined limits           Other Findings   Comments: Chronic thrombocytopenia    -etoh           Physical Exam    Airway  Mallampati: II  TM Distance: 4 - 6 cm  Neck ROM: decreased range of motion   Mouth opening: Normal     Cardiovascular    Rhythm: regular  Rate: normal         Dental         Pulmonary  Breath sounds clear to auscultation               Abdominal         Other Findings            Anesthetic Plan    ASA: 4  Anesthesia type: MAC          Induction: Intravenous  Anesthetic plan and risks discussed with: Patient

## 2017-04-27 NOTE — ANESTHESIA POSTPROCEDURE EVALUATION
Post-Anesthesia Evaluation and Assessment    Cardiovascular Function/Vital Signs  Visit Vitals    /69    Pulse 71    Temp 37 °C (98.6 °F)    Resp 16    Ht 5' 11\" (1.803 m)    Wt 115 kg (253 lb 9.6 oz)    SpO2 99%    BMI 35.37 kg/m2       Patient is status post Procedure(s):  EGD [facility] ANESTHESIA. Nausea/Vomiting: Controlled. Postoperative hydration reviewed and adequate. Pain:  Pain Scale 1: Numeric (0 - 10) (04/25/17 1259)  Pain Intensity 1: 0 (04/25/17 1259)   Managed. Neurological Status:   Neuro (WDL): Within Defined Limits (04/25/17 1043)   At baseline. Mental Status and Level of Consciousness: Arousable. Pulmonary Status:   O2 Device: Room air (04/25/17 1208)   Adequate oxygenation and airway patent. Complications related to anesthesia: None    Post-anesthesia assessment completed. No concerns. Patient has met all discharge requirements.     Signed By: Dalia Sanchez MD    April 27, 2017

## 2017-08-24 RX ORDER — SPIRONOLACTONE 100 MG/1
100 TABLET, FILM COATED ORAL DAILY
Qty: 90 TAB | Refills: 4 | Status: SHIPPED | OUTPATIENT
Start: 2017-08-24 | End: 2018-08-15 | Stop reason: SDUPTHER

## 2017-08-24 RX ORDER — FUROSEMIDE 40 MG/1
40 TABLET ORAL DAILY
Qty: 90 TAB | Refills: 4 | Status: SHIPPED | OUTPATIENT
Start: 2017-08-24 | End: 2018-07-24 | Stop reason: SDUPTHER

## 2017-10-30 RX ORDER — SELENIUM SULFIDE 22.5 MG/ML
SHAMPOO TOPICAL
Qty: 1 BOTTLE | Refills: 2 | OUTPATIENT
Start: 2017-10-30

## 2017-12-04 RX ORDER — SELENIUM SULFIDE 22.5 MG/ML
1 SHAMPOO TOPICAL DAILY
Qty: 1 BOTTLE | Refills: 12 | Status: SHIPPED | OUTPATIENT
Start: 2017-12-04 | End: 2018-07-24

## 2018-07-24 ENCOUNTER — HOSPITAL ENCOUNTER (OUTPATIENT)
Dept: LAB | Age: 63
Discharge: HOME | End: 2018-07-24
Payer: MEDICARE

## 2018-07-24 ENCOUNTER — OFFICE VISIT (OUTPATIENT)
Dept: HEMATOLOGY | Age: 63
End: 2018-07-24

## 2018-07-24 VITALS
OXYGEN SATURATION: 97 % | BODY MASS INDEX: 34.75 KG/M2 | HEIGHT: 71 IN | WEIGHT: 248.2 LBS | TEMPERATURE: 97.8 F | SYSTOLIC BLOOD PRESSURE: 125 MMHG | HEART RATE: 99 BPM | RESPIRATION RATE: 20 BRPM | DIASTOLIC BLOOD PRESSURE: 67 MMHG

## 2018-07-24 DIAGNOSIS — K74.60 CIRRHOSIS OF LIVER WITHOUT ASCITES, UNSPECIFIED HEPATIC CIRRHOSIS TYPE (HCC): ICD-10-CM

## 2018-07-24 DIAGNOSIS — K74.60 CIRRHOSIS OF LIVER WITHOUT ASCITES, UNSPECIFIED HEPATIC CIRRHOSIS TYPE (HCC): Primary | ICD-10-CM

## 2018-07-24 PROBLEM — L03.90 CELLULITIS: Status: ACTIVE | Noted: 2018-07-24

## 2018-07-24 LAB
ALBUMIN SERPL-MCNC: 3.2 G/DL (ref 3.4–5)
ALBUMIN/GLOB SERPL: 0.7 {RATIO} (ref 0.8–1.7)
ALP SERPL-CCNC: 154 U/L (ref 45–117)
ALT SERPL-CCNC: 100 U/L (ref 16–61)
AMMONIA PLAS-SCNC: 55 UMOL/L (ref 11–32)
ANION GAP SERPL CALC-SCNC: 6 MMOL/L (ref 3–18)
AST SERPL-CCNC: 134 U/L (ref 15–37)
BASOPHILS # BLD: 0.1 K/UL (ref 0–0.1)
BASOPHILS NFR BLD: 1 % (ref 0–2)
BILIRUB DIRECT SERPL-MCNC: 0.6 MG/DL (ref 0–0.2)
BILIRUB SERPL-MCNC: 1.7 MG/DL (ref 0.2–1)
BUN SERPL-MCNC: 18 MG/DL (ref 7–18)
BUN/CREAT SERPL: 15 (ref 12–20)
CALCIUM SERPL-MCNC: 10 MG/DL (ref 8.5–10.1)
CHLORIDE SERPL-SCNC: 95 MMOL/L (ref 100–108)
CO2 SERPL-SCNC: 27 MMOL/L (ref 21–32)
CREAT SERPL-MCNC: 1.22 MG/DL (ref 0.6–1.3)
DIFFERENTIAL METHOD BLD: ABNORMAL
EOSINOPHIL # BLD: 0.3 K/UL (ref 0–0.4)
EOSINOPHIL NFR BLD: 3 % (ref 0–5)
ERYTHROCYTE [DISTWIDTH] IN BLOOD BY AUTOMATED COUNT: 15 % (ref 11.6–14.5)
GLOBULIN SER CALC-MCNC: 4.7 G/DL (ref 2–4)
GLUCOSE SERPL-MCNC: 75 MG/DL (ref 74–99)
HCT VFR BLD AUTO: 41.3 % (ref 36–48)
HGB BLD-MCNC: 14.6 G/DL (ref 13–16)
INR PPP: 1.3 (ref 0.8–1.2)
LYMPHOCYTES # BLD: 0.7 K/UL (ref 0.9–3.6)
LYMPHOCYTES NFR BLD: 8 % (ref 21–52)
MCH RBC QN AUTO: 35.2 PG (ref 24–34)
MCHC RBC AUTO-ENTMCNC: 35.4 G/DL (ref 31–37)
MCV RBC AUTO: 99.5 FL (ref 74–97)
MONOCYTES # BLD: 1.3 K/UL (ref 0.05–1.2)
MONOCYTES NFR BLD: 13 % (ref 3–10)
NEUTS SEG # BLD: 7 K/UL (ref 1.8–8)
NEUTS SEG NFR BLD: 75 % (ref 40–73)
PLATELET # BLD AUTO: 102 K/UL (ref 135–420)
PMV BLD AUTO: 10.3 FL (ref 9.2–11.8)
POTASSIUM SERPL-SCNC: 4.7 MMOL/L (ref 3.5–5.5)
PROT SERPL-MCNC: 7.9 G/DL (ref 6.4–8.2)
PROTHROMBIN TIME: 15.8 SEC (ref 11.5–15.2)
RBC # BLD AUTO: 4.15 M/UL (ref 4.7–5.5)
SODIUM SERPL-SCNC: 128 MMOL/L (ref 136–145)
WBC # BLD AUTO: 9.4 K/UL (ref 4.6–13.2)

## 2018-07-24 PROCEDURE — 85610 PROTHROMBIN TIME: CPT | Performed by: NURSE PRACTITIONER

## 2018-07-24 PROCEDURE — 80076 HEPATIC FUNCTION PANEL: CPT | Performed by: NURSE PRACTITIONER

## 2018-07-24 PROCEDURE — 82107 ALPHA-FETOPROTEIN L3: CPT | Performed by: NURSE PRACTITIONER

## 2018-07-24 PROCEDURE — 80048 BASIC METABOLIC PNL TOTAL CA: CPT | Performed by: NURSE PRACTITIONER

## 2018-07-24 PROCEDURE — 85025 COMPLETE CBC W/AUTO DIFF WBC: CPT | Performed by: NURSE PRACTITIONER

## 2018-07-24 PROCEDURE — 87521 HEPATITIS C PROBE&RVRS TRNSC: CPT | Performed by: NURSE PRACTITIONER

## 2018-07-24 PROCEDURE — 36415 COLL VENOUS BLD VENIPUNCTURE: CPT | Performed by: NURSE PRACTITIONER

## 2018-07-24 PROCEDURE — 82140 ASSAY OF AMMONIA: CPT | Performed by: NURSE PRACTITIONER

## 2018-07-24 RX ORDER — CLINDAMYCIN HYDROCHLORIDE 300 MG/1
300 CAPSULE ORAL
COMMUNITY
Start: 2018-07-23 | End: 2018-08-02

## 2018-07-24 RX ORDER — FUROSEMIDE 40 MG/1
80 TABLET ORAL DAILY
Qty: 180 TAB | Refills: 3 | Status: SHIPPED | OUTPATIENT
Start: 2018-07-24 | End: 2018-08-15 | Stop reason: SDUPTHER

## 2018-07-24 RX ORDER — LACTULOSE 10 G/15ML
20 SOLUTION ORAL; RECTAL 3 TIMES DAILY
COMMUNITY
End: 2020-08-19 | Stop reason: DRUGHIGH

## 2018-07-24 RX ORDER — CLINDAMYCIN HYDROCHLORIDE 300 MG/1
CAPSULE ORAL
COMMUNITY
Start: 2018-07-23 | End: 2018-07-24 | Stop reason: SDUPTHER

## 2018-07-24 NOTE — PROGRESS NOTES
70 Nolberto Maloney MD, [address], [place], [place]       MEJIA Arrieta PA-C Suan Goes, [facility]   MEJIA Lewis NP [address] [facility] [address]    at [address], [address] Radha Guan  22.    [phone]    FAX: [address] [place] [address], [address] [place] [address],[address]    [address] [facility] Street: [phone]       Patient Care Team:  Annika Brown MD as PCP - General (Internal Medicine)  Marylen Portugal, MD Darina Schwalbe, MD as Physician (Dermatology)      Problem List  Date Reviewed: 7/24/2018          Codes Class Noted    Cellulitis ICD-10-CM: L03.90  ICD-9-CM: 682.9  7/24/2018        Portal hypertensive gastropathy ([facility] [address] ICD-10-CM: K76.6, K31.89  ICD-9-CM: 572.3, 537.89  12/22/2013        Cirrhosis ([facility] [address] ICD-10-CM: K74.60  ICD-9-CM: 571.5  9/5/2011        Upper GI bleed secondary to NSAID induced gastritis - 9/2010 ICD-10-CM: K92.2  ICD-9-CM: 578.9  9/5/2011        S/P lumbar laminectomy with spinal fusion - 1992 ICD-10-CM: Z98.1  ICD-9-CM: V45.4  9/5/2011        Colon polyps ICD-10-CM: K63.5  ICD-9-CM: 211.3  9/5/2011    Overview Signed 9/5/2011 10:38 AM by Tiara Rousseau MD     Last colonoscopy 9/2010             Esophageal varices ([facility] [address] ICD-10-CM: I85.00  ICD-9-CM: 456.1  9/5/2011        Thrombocytopenia secondary to cirrhosis ICD-10-CM: D69.6  ICD-9-CM: 287.5  9/5/2011        Chronic hepatitis C genotype 1 ICD-10-CM: B18.2  ICD-9-CM: 070.54  Unknown              Abilio Chandler [facility] chronic HCV with cirrhosis.   The active problem list, all pertinent past medical history, medications, liver histology, endoscopic studies, radiologic findings and laboratory findings related to the liver disorder were reviewed with the patient. The patient has not been seen here since 03/2017. The patient reports that he was recently hospitalized at [facility] for a \"leg infection\". He was treated with IV antibiotics for cellulitis. The patient has esophageal varices without bleeding. His previous UGI bleed was from portal gastropathy. He had an EGD in November 2016 that identified small esophageal varices. Mr. Teodora Amaya is on step 1 diuretics. His peripheral edema is well controlled. He is currently taking Xifaxan. He could not tolerate lactulose due to diarrhea. He denies any overt confusion. Mr. Teodora Amaya  participated in the Astral B clinical trial.  Unfortunately he relapsed 12 weeks post treatment. Mr. Teodora Amaya completed twenty-four weeks of treatment with Meléndez Frank with good toleration 13 months ago. He remained HCV RNA undetectable one year post treatment and is considered cured of HCV. The patient has not experienced yellowing of the eyes or skin, itching, dark urine. ALLERGIES:  Allergies   Allergen Reactions    Aspirin Other (comments)     Bleeding      Valium [Diazepam] Swelling     Current Outpatient Prescriptions   Medication Sig    clindamycin (CLEOCIN) 300 mg capsule Take 300 mg by mouth.  lactulose (CHRONULAC) 10 gram/15 mL solution Take 20 g by mouth three (3) times daily. Indications: Hepatic Encephalopathy    furosemide (LASIX) 40 mg tablet Take 1 Tab by mouth daily. Indications: Edema (Patient taking differently: Take 80 mg by mouth daily. Indications: Edema)    spironolactone (ALDACTONE) 100 mg tablet Take 1 Tab by mouth daily. Indications: Edema (Patient taking differently: Take 200 mg by mouth daily. Indications: Edema)     No current facility-administered medications for this visit.       SYSTEM REVIEW NOT RELATED TO LIVER DISEASE OR REVIEWED ABOVE:  Constitution systems: Negative for fever, chills, weight loss. Positive for weight gain. Eyes: Negative for visual changes. ENT: Negative for sore throat, painful swallowing. Respiratory: Negative for cough, hemoptysis, SOB. Cardiology: Negative for chest pain, palpitations. GI:  Negative for constipation or diarrhea. : Negative for urinary frequency, dysuria, hematuria, nocturia. Skin: Negative for rash. Hematology: Negative for easy bruising, blood clots. Musculo-skelatal: Negative for back pain, muscle pain, weakness. Neurologic: Negative for headaches, dizziness, vertigo, memory problems not related to HE. Psychology: Negative for anxiety, depression. FAMILY/SOCIAL HISTORY:  The patient is . There are 3 children. The patient smokes tobacco. The patient has been abstinent from alcohol since 2007. The patient is currently on disability. PHYSICAL EXAMINATION:    Visit Vitals    /67 (BP 1 Location: Left arm, BP Patient Position: Sitting)    Pulse 99    Temp 97.8 °F (36.6 °C) (Tympanic)    Resp 20    Ht 5' 11\" (1.803 m)    Wt 248 lb 3.2 oz (112.6 kg)    SpO2 97%    BMI 34.62 kg/m2       General: No acute distress. Eyes: Sclera anicteric. ENT: No oral lesions, thyroid normal.  Nodes: No adenopathy. Skin: Spider angiomata on chest, palmar erythema. Respiratory: Lungs clear to auscultation. Cardiovascular: Regular heart rate, no murmurs, no JVD. Abdomen: Soft non-tender, liver enlarged. Spleen not palpable. No obvious ascites. Umbilical hernia present. Extremities: Trace bilateral pedal edema. No muscle wasting. No gross arthritic changes. Neurologic: Alert and oriented, cranial nerves grossly intact, no asterixsis.     LABORATORY STUDIES:  [facility] [address] [place] [address] 7/24/2018   WBC 4.6 - 13.2 K/uL 9.4   ANC 1.8 - 8.0 K/UL 7.0   HGB 13.0 - 16.0 g/dL 14.6    - 420 K/uL 102 (L)   INR 0.8 - 1.2   1.3 (H)   AST 15 - 37 U/L 134 (H)   ALT 16 - 61 U/L 100 (H)   Alk Phos 45 - 117 U/L 154 (H)   Bili, Total 0.2 - 1.0 MG/DL 1.7 (H)   Bili, Direct 0.0 - 0.2 MG/DL 0.6 (H)   Albumin 3.4 - 5.0 g/dL 3.2 (L)   BUN 7.0 - 18 MG/DL 18   Creat 0.6 - 1.3 MG/DL 1.22   Na 136 - 145 mmol/L 128 (L)   K 3.5 - 5.5 mmol/L 4.7   Cl 100 - 108 mmol/L 95 (L)   CO2 21 - 32 mmol/L 27   Glucose 74 - 99 mg/dL 75   Ammonia 11 - 32 UMOL/L 55 (H)     Cancer Screening Latest Ref Rng & Units 7/24/2018  3/6/2017   AFP, Serum 0.0 - 8.0 ng/mL 8.1 (H)  8.0   AFP-L3% 0.0 - 9.9 % 7.0  6.1       SEROLOGIES:  12/10:  HAV total positive, HB surface antigen negative, anti-HB surface negative, HCV genotype 1, HCV RNA log 5.5,    7/2011: Ferritin 73, iron saturation 57%. LIVER HISTOLOGY:  Not performed    ENDOSCOPIC PROCEDURES:  9/2010:  Colonoscopy while hospitalized in [facility] - reported to demonstrate colonic polyps. 9/2010:  EGD while hospitalized in [facility] - reported to demonstrate severe gastritis from NSAIDs with small esophageal varices. 2/2011: EGD by [facility]. Esophageal varicies. Banding performed. 11/2011:  EGD by [facility]. Severe portal gastropathy. No varices. 4/2012:  EGD by [facility]:  Grade 2 esophageal varices. Banding performed. Severe hypertensive portal gastropathy. 8/2012:  EGD per [facility]. Small esophageal varices. Severe portal hypertensive gastropathy, gastritis of the antrum, no gastric varices. Repeat in 6 months. 10/2013. EGD by Dr Gunner Connor. Small varices with stigmata of bleeding. Portal gastropathy. 07/2014:  EGD per [facility]. 2 medium esophageal varices. Banding performed. Moderate portal hypertensive gastropathy. Repeat in one year. 9/1/2015: EGD per [facility]. Duodenal ulcer present. H. Pylori negative. Portal hypertensive gastropathy. Esophageal varices present. Four bands placed. Repeat in 6 months. 11/2015:  EGD per Dr. Kasia Lopez. Moderate portal gastropathy. Grade 1 esophageal varices. No gastric varices noted. 11/2015:  Colonoscopy per Dr. Kasia Lopez. Mild diverticulosis.    11/2016: EGD per Dr. Nan Dash. Small esophageal varices. Severe hypertensive portal gastropathy. Repeat in 6 months. 01/2018. EGD by Dr. Kobe Garzon. No report available. RADIOLOGY:  11/2010: Ultrasound of the liver. Echogenic consistent with cirrhosis. No liver mass lesions. No ascites. 2/2011: Ultrasound of the liver. Changes consistent with cirrhosis. No liver mass lesions. No ascites. No portal vein thrombosis. 11/2011: Ultrasound of the liver. Echogenic consistent with chronic liver disease, cirrhosis. No liver mass lesions. No ascites. 04/12:  CT of abdomen and pelvis - hepatic cirrhosis with portal hypertension with scattered upper abdominal collateralzation and varices. Likely recanalization of the umbilical artery. No mass noted. 1/2015:  Ultrasound of liver. Echogenic consistent with cirrhosis. No liver mass lesions. No dilated bile ducts. No ascites. 8/2015:  CT of the abdomen and pelvis. Coarsened, micronodular surface of the liver with widening of the interlobar fissure. No intrahepatic biliary ductal dilation. Stable enlargement of the portal vein with recanalization of the umbilical vein. Paraesophageal varices. Grossly stable splenomegaly  1/2016:  Ultrasound of liver. Echogenic consistent with cirrhosis. No liver mass lesions. No dilated bile ducts. No ascites. 11/2016:  Ultrasound of the liver. Echogenic consistent with cirrhosis. No liver mass lesions. No dilated bile ducts. No ascites. 05/2018. Abdominal ultrasound. Coarsened hepatic echogenicity consistent with hepatic parenchymal disease. No focal lesions or biliary dilatation demonstrated. 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 The HCV has been treated and cured.       The most recent laboratory studies indicate that the liver transaminases are elevated, alkaline phosphatase is elevated, tests of hepatic synthetic and metabolic function are depressed, and the platelet count is depressed. HCV treatment. Mr. Susan Lyons completed twenty-four weeks of treatment with [place] with good toleration in 2015. He remained HCV RNA undetectable one year post treatment. HCV RNA still pending. Upper GI bleed appears to have been due to portal gastropathy. Repeat EGD with MAC was ordered. Screening for varices. Hepatic encephalopathy. I have ordered an ammonia level today. The patient was instructed not to operate a motor vehicle because of HE which poses an increased risk for MVA. Ammonia level elevated on current labs at 55. Lower extremity. The patient is currently on step 2 diuretics. Continue this dose. I have spoken to him about the importance of salt restrictions. The patient was counseled regarding alcohol consumption. Vaccination for viral hepatitis B is recommended since the patient has no serologic evidence of previous exposure or vaccination with immunity. Vaccination for viral hepatitis A is not required. The patient has serologic evidence of prior exposure or vaccination with immunity. [address] screening will be performed. AFP ordered today. Abdominal ultrasound ordered. This will be performed with shear wave elastography. Elastography  can assess liver fibrosis and determine if a patient has advanced fibrosis or cirrhosis without the need for liver biopsy. Thrombocytopenia secondary to cirrhosis from chronic HCV. No treatment necessary. [facility] 87 in 4 weeks. All of the above issues were discussed with the patient. All questions were answered. The patient expressed a clear understanding of the above. 40 minutes total time spent with this patient with more than 50% of this time spent counseling and coordinating care as described above.      Neal Kim, JOSEFA-C  [facility] [place] [address] [place] [address],[address]  [address] [place] [address], [address] [place] [address] [phone]

## 2018-07-24 NOTE — PROGRESS NOTES
1. Have you been to the ER, urgent care clinic since your last visit? Hospitalized since your last visit? Yes When: 7/2018 [facility], Infection in leg. Reason for visit: Infection in leg. 2. Have you seen or consulted any other health care providers outside of the [address] [place] [address] since your last visit? Include any pap smears or colon screening. Yes When: 7/2018 Pcp Visit.

## 2018-07-24 NOTE — MR AVS SNAPSHOT
[address] [address] [address] [address] 
[phone] Patient: Lisa Hewitt MRN: [id number] Jailene Amaral Visit Information Date & Time Provider Department Dept. Phone Encounter #  
 7/24/2018 10:30 AM MEJIA [address] of  [address] [id number] Follow-up Instructions Return in about 6 weeks (around 9/4/2018). Your Appointments 9/5/2018 10:30 AM  
Follow Up with Kulwant Denton NP [address] [facility] ([address]) Appt Note: f/u  
 [address], [address] [facility] [address]  
[phone]  
  
   
 [address]  
  
    
 10/15/2018  8:00 AM  
PROCEDURE with Kiran Cornelius MD  
[address] [facility] ([address]) Appt Note: EGD  
 [address], [address] [facility] [place]  
  
   
 1200 [facility], [address] [facility] Upcoming Health Maintenance Date Due DTaP/Tdap/Td series (1 - Tdap) 8/18/1977 FOBT Q 1 YEAR AGE 50-75 8/18/2006 ZOSTER VACCINE AGE 60> 6/18/2016 MEDICARE YEARLY EXAM 3/14/2018 Influenza Age 5 to Adult 8/1/2018 Allergies as of 7/24/2018  Review Complete On: 7/24/2018 By: Usman Lynch Severity Noted Reaction Type Reactions Aspirin  12/12/2013    Other (comments) Bleeding Valium [Diazepam]    Swelling Current Immunizations  Reviewed on 10/19/2013 Name Date Influenza Vaccine  Deferred (Patient Refused) Pneumococcal Vaccine (Unspecified Type) 4/1/2013 Not reviewed this visit You Were Diagnosed With   
  
 Codes Comments Cirrhosis of liver without ascites, unspecified hepatic cirrhosis type ([facility]    -  Primary ICD-10-CM: K74.60 ICD-9-CM: 571.5 Vitals BP Pulse Temp Resp Height(growth percentile) 125/67 (BP 1 Location: Left arm, BP Patient Position: Sitting) 99 97.8 °F (36.6 °C) (Tympanic) 20 5' 11\" (1.803 m) Weight(growth percentile) SpO2 BMI Smoking Status 248 lb 3.2 oz (112.6 kg) 97% 34.62 kg/m2 Current Every Day Smoker BMI and BSA Data Body Mass Index Body Surface Area  
 34.62 kg/m 2 2.38 m 2 Preferred Pharmacy Pharmacy Name Phone [id number], [address] [address] [phone] Your Updated Medication List  
  
   
This list is accurate as of 7/24/18 11:21 AM.  Always use your most recent med list.  
  
  
  
  
 clindamycin 300 mg capsule Commonly known as:  CLEOCIN Take 300 mg by mouth. furosemide 40 mg tablet Commonly known as:  LASIX Take 2 Tabs by mouth daily. Indications: Edema  
  
 lactulose 10 gram/15 mL solution Commonly known as:  Sultana Elena Take 20 g by mouth three (3) times daily. Indications: Hepatic Encephalopathy  
  
 spironolactone 100 mg tablet Commonly known as:  ALDACTONE Take 1 Tab by mouth daily. Indications: Edema Prescriptions Sent to Pharmacy Refills  
 furosemide (LASIX) 40 mg tablet 3 Sig: Take 2 Tabs by mouth daily. Indications: Edema Class: Normal  
 Pharmacy: [id number] - [address] [facility] [address] Ph #: [phone] Route: Oral  
  
Follow-up Instructions Return in about 6 weeks (around 9/4/2018). To-Do List   
 07/24/2018 Imaging:  US ABD LTD W ELASTOGRAPHY   
  
 08/23/2018 GI:  EGD Introducing \[facility]\"" & HEALTH SERVICES! Dear Rafi Spore: Thank you for requesting a [employer] account. Our records indicate that you already have an active [employer] account. You can access your account anytime at [email or web address] Did you know that you can access your hospital and ER discharge instructions at any time in [employer]? You can also review all of your test results from your hospital stay or ER visit. Additional Information If you have questions, please visit the Frequently Asked Questions section of the [employer] website at [email or web address] Remember, [employer] is NOT to be used for urgent needs. For medical emergencies, dial 911. Now available from your iPhone and Android! Please provide this summary of care documentation to your next provider. Your primary care clinician is listed as Meagan Wright. If you have any questions after today's visit, please call [phone].

## 2018-07-25 ENCOUNTER — TELEPHONE (OUTPATIENT)
Dept: HEMATOLOGY | Age: 63
End: 2018-07-25

## 2018-07-25 LAB
AFP L3 MFR SERPL: 7 % (ref 0–9.9)
AFP SERPL-MCNC: 8.1 NG/ML (ref 0–8)

## 2018-07-25 NOTE — TELEPHONE ENCOUNTER
Patient would like for you to give him a call. He stated that his Pcp told him today from the look of his lab results he has liver failure.

## 2018-07-26 ENCOUNTER — TELEPHONE (OUTPATIENT)
Dept: HEMATOLOGY | Age: 63
End: 2018-07-26

## 2018-07-26 NOTE — TELEPHONE ENCOUNTER
Patient states he recently had lab work done at his PCP and they told him he had liver failure. Informed patient he has Cirrhosis according to his chart, Chronic liver damage can lead to liver failure.      Informed pt we will call him when his lab results come back on Monday!!    Patient seems to be worried

## 2018-07-27 LAB
HCV RNA SERPL NAA+PROBE-LOG IU: NOT DETECTED HCV LOG 10IU/ML
HCV RNA SERPL PROBE AMP-ACNC: NOT DETECTED HCVIU/ML
HCV RNA SERPL QL NAA+PROBE: NOT DETECTED

## 2018-07-30 ENCOUNTER — HOSPITAL ENCOUNTER (OUTPATIENT)
Dept: ULTRASOUND IMAGING | Age: 63
Discharge: HOME | End: 2018-07-30
Payer: MEDICARE

## 2018-07-30 DIAGNOSIS — K74.60 CIRRHOSIS OF LIVER WITHOUT ASCITES, UNSPECIFIED HEPATIC CIRRHOSIS TYPE (HCC): ICD-10-CM

## 2018-07-30 PROCEDURE — 0346T US ABD LTD W ELASTOGRAPHY: CPT

## 2018-08-15 RX ORDER — SPIRONOLACTONE 100 MG/1
200 TABLET, FILM COATED ORAL DAILY
Qty: 30 TAB | Refills: 5 | Status: SHIPPED | OUTPATIENT
Start: 2018-08-15 | End: 2018-09-05

## 2018-08-15 RX ORDER — FUROSEMIDE 40 MG/1
80 TABLET ORAL DAILY
Qty: 180 TAB | Refills: 5 | Status: SHIPPED | OUTPATIENT
Start: 2018-08-15 | End: 2018-09-05 | Stop reason: SDUPTHER

## 2018-09-05 ENCOUNTER — HOSPITAL ENCOUNTER (OUTPATIENT)
Dept: LAB | Age: 63
Discharge: HOME | End: 2018-09-05
Payer: MEDICARE

## 2018-09-05 ENCOUNTER — OFFICE VISIT (OUTPATIENT)
Dept: HEMATOLOGY | Age: 63
End: 2018-09-05

## 2018-09-05 VITALS
HEART RATE: 82 BPM | SYSTOLIC BLOOD PRESSURE: 110 MMHG | BODY MASS INDEX: 33.18 KG/M2 | WEIGHT: 237 LBS | HEIGHT: 71 IN | TEMPERATURE: 98.5 F | OXYGEN SATURATION: 97 % | DIASTOLIC BLOOD PRESSURE: 67 MMHG | RESPIRATION RATE: 16 BRPM

## 2018-09-05 DIAGNOSIS — K74.60 CIRRHOSIS OF LIVER WITHOUT ASCITES, UNSPECIFIED HEPATIC CIRRHOSIS TYPE (HCC): Primary | ICD-10-CM

## 2018-09-05 DIAGNOSIS — K74.60 CIRRHOSIS OF LIVER WITHOUT ASCITES, UNSPECIFIED HEPATIC CIRRHOSIS TYPE (HCC): ICD-10-CM

## 2018-09-05 DIAGNOSIS — K76.0 FATTY (CHANGE OF) LIVER, NOT ELSEWHERE CLASSIFIED: ICD-10-CM

## 2018-09-05 LAB
ALBUMIN SERPL-MCNC: 3.1 G/DL (ref 3.4–5)
ALBUMIN/GLOB SERPL: 0.8 {RATIO} (ref 0.8–1.7)
ALP SERPL-CCNC: 105 U/L (ref 45–117)
ALT SERPL-CCNC: 50 U/L (ref 16–61)
AMMONIA PLAS-SCNC: 127 UMOL/L (ref 11–32)
ANION GAP SERPL CALC-SCNC: 6 MMOL/L (ref 3–18)
AST SERPL-CCNC: 77 U/L (ref 15–37)
BASOPHILS # BLD: 0 K/UL (ref 0–0.1)
BASOPHILS NFR BLD: 1 % (ref 0–2)
BILIRUB DIRECT SERPL-MCNC: 1 MG/DL (ref 0–0.2)
BILIRUB SERPL-MCNC: 3.1 MG/DL (ref 0.2–1)
BUN SERPL-MCNC: 10 MG/DL (ref 7–18)
BUN/CREAT SERPL: 10 (ref 12–20)
CALCIUM SERPL-MCNC: 8.4 MG/DL (ref 8.5–10.1)
CHLORIDE SERPL-SCNC: 101 MMOL/L (ref 100–108)
CHOLEST SERPL-MCNC: 232 MG/DL
CO2 SERPL-SCNC: 28 MMOL/L (ref 21–32)
CREAT SERPL-MCNC: 1.04 MG/DL (ref 0.6–1.3)
DIFFERENTIAL METHOD BLD: ABNORMAL
EOSINOPHIL # BLD: 0.2 K/UL (ref 0–0.4)
EOSINOPHIL NFR BLD: 4 % (ref 0–5)
ERYTHROCYTE [DISTWIDTH] IN BLOOD BY AUTOMATED COUNT: 13.9 % (ref 11.6–14.5)
GLOBULIN SER CALC-MCNC: 4.1 G/DL (ref 2–4)
GLUCOSE SERPL-MCNC: 116 MG/DL (ref 74–99)
HCT VFR BLD AUTO: 40.1 % (ref 36–48)
HDLC SERPL-MCNC: 65 MG/DL (ref 40–60)
HDLC SERPL: 3.6 {RATIO} (ref 0–5)
HGB BLD-MCNC: 14.4 G/DL (ref 13–16)
INR PPP: 1.3 (ref 0.8–1.2)
LDLC SERPL CALC-MCNC: 140.4 MG/DL (ref 0–100)
LIPID PROFILE,FLP: ABNORMAL
LYMPHOCYTES # BLD: 0.6 K/UL (ref 0.9–3.6)
LYMPHOCYTES NFR BLD: 10 % (ref 21–52)
MCH RBC QN AUTO: 33.8 PG (ref 24–34)
MCHC RBC AUTO-ENTMCNC: 35.9 G/DL (ref 31–37)
MCV RBC AUTO: 94.1 FL (ref 74–97)
MONOCYTES # BLD: 0.6 K/UL (ref 0.05–1.2)
MONOCYTES NFR BLD: 10 % (ref 3–10)
NEUTS SEG # BLD: 4.4 K/UL (ref 1.8–8)
NEUTS SEG NFR BLD: 75 % (ref 40–73)
PLATELET # BLD AUTO: 52 K/UL (ref 135–420)
PMV BLD AUTO: 9.7 FL (ref 9.2–11.8)
POTASSIUM SERPL-SCNC: 3.7 MMOL/L (ref 3.5–5.5)
PROT SERPL-MCNC: 7.2 G/DL (ref 6.4–8.2)
PROTHROMBIN TIME: 16.3 SEC (ref 11.5–15.2)
RBC # BLD AUTO: 4.26 M/UL (ref 4.7–5.5)
SODIUM SERPL-SCNC: 135 MMOL/L (ref 136–145)
TRIGL SERPL-MCNC: 133 MG/DL (ref ?–150)
VLDLC SERPL CALC-MCNC: 26.6 MG/DL
WBC # BLD AUTO: 5.9 K/UL (ref 4.6–13.2)

## 2018-09-05 PROCEDURE — 36415 COLL VENOUS BLD VENIPUNCTURE: CPT | Performed by: NURSE PRACTITIONER

## 2018-09-05 PROCEDURE — 80061 LIPID PANEL: CPT | Performed by: NURSE PRACTITIONER

## 2018-09-05 PROCEDURE — 85610 PROTHROMBIN TIME: CPT | Performed by: NURSE PRACTITIONER

## 2018-09-05 PROCEDURE — 85025 COMPLETE CBC W/AUTO DIFF WBC: CPT | Performed by: NURSE PRACTITIONER

## 2018-09-05 PROCEDURE — 82107 ALPHA-FETOPROTEIN L3: CPT | Performed by: NURSE PRACTITIONER

## 2018-09-05 PROCEDURE — 82140 ASSAY OF AMMONIA: CPT | Performed by: NURSE PRACTITIONER

## 2018-09-05 PROCEDURE — 80048 BASIC METABOLIC PNL TOTAL CA: CPT | Performed by: NURSE PRACTITIONER

## 2018-09-05 PROCEDURE — 80076 HEPATIC FUNCTION PANEL: CPT | Performed by: NURSE PRACTITIONER

## 2018-09-05 RX ORDER — FUROSEMIDE 80 MG/1
TABLET ORAL DAILY
COMMUNITY
End: 2018-09-05 | Stop reason: SDUPTHER

## 2018-09-05 RX ORDER — FUROSEMIDE 80 MG/1
80 TABLET ORAL DAILY
Qty: 90 TAB | Refills: 3 | Status: SHIPPED | OUTPATIENT
Start: 2018-09-05 | End: 2019-05-01

## 2018-09-05 RX ORDER — SPIRONOLACTONE 100 MG/1
200 TABLET, FILM COATED ORAL DAILY
Qty: 180 TAB | Refills: 3 | Status: SHIPPED | OUTPATIENT
Start: 2018-09-05 | End: 2019-05-01

## 2018-09-05 NOTE — PROGRESS NOTES
70 Nolberto Maloney MD, [address], [place], [place]       Jason Ram, DAISY Gudino, NADIA-BC   MEJIA Giordano Res, NP [facility]    at [address], [address] Radha Guan  22.    [phone]    FAX: [address] [place] [address] Avenue    [address], [address] [place] [address],[address]    [address] [place] [address] Street: [phone]       Patient Care Team:  Radha Diop MD as PCP - General (Internal Medicine)  Aurelia Tavares MD as Physician (Dermatology)      Problem List  Date Reviewed: 7/24/2018          Codes Class Noted    Cellulitis ICD-10-CM: L03.90  ICD-9-CM: 682.9  7/24/2018        Portal hypertensive gastropathy ([facility] ICD-10-CM: K76.6, K31.89  ICD-9-CM: 572.3, 537.89  12/22/2013        Cirrhosis ([facility] ICD-10-CM: K74.60  ICD-9-CM: 571.5  9/5/2011        Upper GI bleed secondary to NSAID induced gastritis - 9/2010 ICD-10-CM: K92.2  ICD-9-CM: 578.9  9/5/2011        S/P lumbar laminectomy with spinal fusion - 1992 ICD-10-CM: Z98.1  ICD-9-CM: V45.4  9/5/2011        Colon polyps ICD-10-CM: K63.5  ICD-9-CM: 211.3  9/5/2011    Overview Signed 9/5/2011 10:38 AM by Jean Pierre George MD     Last colonoscopy 9/2010             Esophageal varices ([facility] ICD-10-CM: I85.00  ICD-9-CM: 456.1  9/5/2011        Thrombocytopenia secondary to cirrhosis ICD-10-CM: D69.6  ICD-9-CM: 287.5  9/5/2011        Chronic hepatitis C genotype 1 ICD-10-CM: B18.2  ICD-9-CM: 070.54  Unknown              Gilbert Fowler returns to the [place] [address] for 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 The HCV has been treated and cured.   The active problem list, all pertinent past medical history, medications, liver histology, endoscopic studies, radiologic findings and laboratory findings related to the liver disorder were reviewed with the patient. The patient has not been seen here since 03/2017. The patient reports that he was recently hospitalized at [facility] for a \"leg infection\". He was treated with IV antibiotics for cellulitis. The patient has esophageal varices without bleeding. His previous UGI bleed was from portal gastropathy. Last EGD was performed in 01/2018. Mr. Akil Barriga is on step 1 diuretics. His peripheral edema is well controlled. He is currently taking lactulose and tolerating this without complaint. Mr. Akil Barriga  participated in the Astral B clinical trial.  Unfortunately he relapsed 12 weeks post treatment. Mr. Akil Barriga completed twenty-four weeks of treatment with Ardyth Skains in 2015. The HCV has been treated and cured. The patient completes all daily activities without any functional limitations. The patient has not experienced fatigue, fevers, chills, shortness of breath, chest pain, pain in the right side over the liver, diffuse abdominal pain, nausea, vomiting, constipation, diarrhrea, dry eyes, dry mouth, arthralgias, myalgias, yellowing of the eyes or skin, itching, dark urine, problems concentrating, swelling of the abdomen, swelling of the lower extremities, hematemesis, or hematochezia. ALLERGIES:  Allergies   Allergen Reactions    Aspirin Other (comments)     Bleeding      Valium [Diazepam] Swelling     Current Outpatient Prescriptions   Medication Sig    furosemide (LASIX) 40 mg tablet Take 2 Tabs by mouth daily. Indications: Edema    spironolactone (ALDACTONE) 100 mg tablet Take 2 Tabs by mouth daily. Indications: Edema    lactulose (CHRONULAC) 10 gram/15 mL solution Take 20 g by mouth three (3) times daily. Indications: Hepatic Encephalopathy     No current facility-administered medications for this visit.       SYSTEM REVIEW NOT RELATED TO LIVER DISEASE OR REVIEWED ABOVE:  Constitution systems: Negative for fever, chills, weight loss. Positive for weight gain. Eyes: Negative for visual changes. ENT: Negative for sore throat, painful swallowing. Respiratory: Negative for cough, hemoptysis, SOB. Cardiology: Negative for chest pain, palpitations. GI:  Negative for constipation or diarrhea. : Negative for urinary frequency, dysuria, hematuria, nocturia. Skin: Negative for rash. Hematology: Negative for easy bruising, blood clots. Musculo-skelatal: Negative for back pain, muscle pain, weakness. Neurologic: Negative for headaches, dizziness, vertigo, memory problems not related to HE. Psychology: Negative for anxiety, depression. FAMILY/SOCIAL HISTORY:  The patient is . There are 3 children. The patient smokes tobacco. The patient has been abstinent from alcohol since 2007. The patient is currently on disability. PHYSICAL EXAMINATION:    Visit Vitals    /67 (BP 1 Location: Left arm, BP Patient Position: Sitting)    Pulse 82    Temp 98.5 °F (36.9 °C) (Tympanic)    Resp 16    Ht 5' 11\" (1.803 m)    Wt 237 lb (107.5 kg)    SpO2 97%    BMI 33.05 kg/m2       General: No acute distress. Eyes: Sclera anicteric. ENT: No oral lesions, thyroid normal.  Nodes: No adenopathy. Skin: Spider angiomata on chest, palmar erythema. Respiratory: Lungs clear to auscultation. Cardiovascular: Regular heart rate, no murmurs, no JVD. Abdomen: Soft non-tender, liver enlarged. Spleen not palpable. No obvious ascites. Umbilical hernia present. Extremities: Trace bilateral pedal edema. No muscle wasting. No gross arthritic changes. Neurologic: Alert and oriented, cranial nerves grossly intact, no asterixsis.     LABORATORY STUDIES:  Liver [place] of [address] [place] [address] & Units 9/5/2018 7/24/2018   WBC 4.6 - 13.2 K/uL 5.9 9.4   ANC 1.8 - 8.0 K/UL 4.4 7.0   HGB 13.0 - 16.0 g/dL 14.4 14.6    - 420 K/uL 52 (L) 102 (L)   INR 0.8 - 1.2   1.3 (H) 1.3 (H)   AST 15 - 37 U/L 77 (H) 134 (H)   ALT 16 - 61 U/L 50 100 (H)   Alk Phos 45 - 117 U/L 105 154 (H)   Bili, Total 0.2 - 1.0 MG/DL 3.1 (H) 1.7 (H)   Bili, Direct 0.0 - 0.2 MG/DL 1.0 (H) 0.6 (H)   Albumin 3.4 - 5.0 g/dL 3.1 (L) 3.2 (L)   BUN 7.0 - 18 MG/DL 10 18   Creat 0.6 - 1.3 MG/DL 1.04 1.22   Na 136 - 145 mmol/L 135 (L) 128 (L)   K 3.5 - 5.5 mmol/L 3.7 4.7   Cl 100 - 108 mmol/L 101 95 (L)   CO2 21 - 32 mmol/L 28 27   Glucose 74 - 99 mg/dL 116 (H) 75   Ammonia 11 - 32 UMOL/L 127 (H) 55 (H)     Cancer Screening Latest Ref Rng & Units 9/5/2018 7/24/2018   AFP, Serum 0.0 - 8.0 ng/mL 9.5 (H) 8.1 (H)   AFP-L3% 0.0 - 9.9 % 6.1 7.0     SEROLOGIES:  12/10:  HAV total positive, HB surface antigen negative, anti-HB surface negative, HCV genotype 1, HCV RNA log 5.5,    7/2011: Ferritin 73, iron saturation 57%. LIVER HISTOLOGY:  07/2018. TRANSIENT HEPATIC ELASTOGRAPHY:   E Range: 9.44-22.22 kPa  E Mean: 14.32 kPa  E Median: 14.68 kPa  E Std: 3.91 kPa    ENDOSCOPIC PROCEDURES:  9/2010:  Colonoscopy while hospitalized in [facility] - reported to demonstrate colonic polyps. 9/2010:  EGD while hospitalized in [facility] - reported to demonstrate severe gastritis from NSAIDs with small esophageal varices. 2/2011: EGD by MLS. Esophageal varicies. Banding performed. 11/2011:  EGD by MLS. Severe portal gastropathy. No varices. 4/2012:  EGD by MLS:  Grade 2 esophageal varices. Banding performed. Severe hypertensive portal gastropathy. 8/2012:  EGD per MLS. Small esophageal varices. Severe portal hypertensive gastropathy, gastritis of the antrum, no gastric varices. Repeat in 6 months. 10/2013. EGD by Dr J Luis Ca. Small varices with stigmata of bleeding. Portal gastropathy. 07/2014:  EGD per MLS. 2 medium esophageal varices. Banding performed. Moderate portal hypertensive gastropathy. Repeat in one year. 9/1/2015: EGD per MLS. Duodenal ulcer present. H. Pylori negative.   Portal hypertensive gastropathy. Esophageal varices present. Four bands placed. Repeat in 6 months. 11/2015:  EGD per Dr. Tam Amato. Moderate portal gastropathy. Grade 1 esophageal varices. No gastric varices noted. 11/2015:  Colonoscopy per Dr. Tam Amato. Mild diverticulosis. 11/2016:  EGD per Dr. Dahlia Garvin. Small esophageal varices. Severe hypertensive portal gastropathy. Repeat in 6 months. 01/2018. EGD by Dr. Silva Dubois. No report available. RADIOLOGY:  11/2010: Ultrasound of the liver. Echogenic consistent with cirrhosis. No liver mass lesions. No ascites. 2/2011: Ultrasound of the liver. Changes consistent with cirrhosis. No liver mass lesions. No ascites. No portal vein thrombosis. 11/2011: Ultrasound of the liver. Echogenic consistent with chronic liver disease, cirrhosis. No liver mass lesions. No ascites. 04/12:  CT of abdomen and pelvis - hepatic cirrhosis with portal hypertension with scattered upper abdominal collateralzation and varices. Likely recanalization of the umbilical artery. No mass noted. 1/2015:  Ultrasound of liver. Echogenic consistent with cirrhosis. No liver mass lesions. No dilated bile ducts. No ascites. 8/2015:  CT of the abdomen and pelvis. Coarsened, micronodular surface of the liver with widening of the interlobar fissure. No intrahepatic biliary ductal dilation. Stable enlargement of the portal vein with recanalization of the umbilical vein. Paraesophageal varices. Grossly stable splenomegaly  1/2016:  Ultrasound of liver. Echogenic consistent with cirrhosis. No liver mass lesions. No dilated bile ducts. No ascites. 11/2016:  Ultrasound of the liver. Echogenic consistent with cirrhosis. No liver mass lesions. No dilated bile ducts. No ascites. 05/2018. Abdominal ultrasound. Coarsened hepatic echogenicity consistent with hepatic parenchymal disease. No focal lesions or biliary dilatation demonstrated.     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 The HCV has been treated and cured. The most recent laboratory studies indicate that the AST is elevated, the ALT is normal, alkaline phosphatase is normal, tests of hepatic synthetic and metabolic function are depressed, and the platelet count is depressed. HCV treatment. Mr. Vita Grande completed twenty-four weeks of treatment with Laura Blonder with good toleration in 09/2016. He remained HCV RNA undetectable one year post treatment. HCV RNA still pending. Upper GI bleed appears to have been due to portal gastropathy. Repeat EGD with MAC was ordered. Screening for varices. This scheduled for 10/15/2018. Hepatic encephalopathy. I have ordered an ammonia level today. The patient was instructed not to operate a motor vehicle because of HE which poses an increased risk for MVA. Ammonia level elevated on current labs at 55. Lower extremity. The patient is currently on step 2 diuretics. Continue this dose. I have spoken to him about the importance of salt restrictions. The patient was counseled regarding alcohol consumption. Vaccination for viral hepatitis B is recommended since the patient has no serologic evidence of previous exposure or vaccination with immunity. Vaccination for viral hepatitis A is not required. The patient has serologic evidence of prior exposure or vaccination with immunity. [facility] screening will be performed. AFP ordered today. Abdominal ultrasound ordered. This will be performed with shear wave elastography. Elastography  can assess liver fibrosis and determine if a patient has advanced fibrosis or cirrhosis without the need for liver biopsy. Thrombocytopenia secondary to cirrhosis from chronic HCV. No treatment necessary. [facility] in 8 weeks. All of the above issues were discussed with the patient. All questions were answered.   The patient expressed a clear understanding of the above.      JOSEFA Aviles-MARIA TERESA  Liver [place] of [address], [address] [place] [address] Elyse Cartagena, [address] [facility]   [phone]

## 2018-09-05 NOTE — MR AVS SNAPSHOT
[address] [address] [address] [address] 
[phone] Patient: Malen Number MRN: [id number] Tiny Alvarado Visit Information Date & Time Provider Department Dept. Phone Encounter #  
 9/5/2018 10:30 AM MEJIA [address] of  [address] [id number] Follow-up Instructions Return in about 8 weeks (around 10/31/2018). Your Appointments 10/15/2018  8:30 AM  
PROCEDURE with Brenda Magallanes MD  
[address] [facility] ([address] [facility]) Appt Note: EGD (O.R)  
 1200 [facility], [facility] [address], [address] [place] [address] Upcoming Health Maintenance Date Due DTaP/Tdap/Td series (1 - Tdap) 8/18/1977 FOBT Q 1 YEAR AGE 50-75 8/18/2006 ZOSTER VACCINE AGE 60> 6/18/2016 MEDICARE YEARLY EXAM 3/14/2018 Influenza Age 5 to Adult 8/1/2018 Allergies as of 9/5/2018  Review Complete On: 9/5/2018 By: Yara Darling Severity Noted Reaction Type Reactions Aspirin  12/12/2013    Other (comments) Bleeding Valium [Diazepam]    Swelling Current Immunizations  Reviewed on 10/19/2013 Name Date Influenza Vaccine  Deferred (Patient Refused) Pneumococcal Vaccine (Unspecified Type) 4/1/2013 Not reviewed this visit You Were Diagnosed With   
  
 Codes Comments Cirrhosis of liver without ascites, unspecified hepatic cirrhosis type ([facility]    -  Primary ICD-10-CM: K74.60 ICD-9-CM: 571.5 Fatty (change of) liver, not elsewhere classified     ICD-10-CM: K76.0 ICD-9-CM: 571.8 Vitals BP Pulse Temp Resp Height(growth percentile) 110/67 (BP 1 Location: Left arm, BP Patient Position: Sitting) 82 98.5 °F (36.9 °C) (Tympanic) 16 5' 11\" (1.803 m) Weight(growth percentile) SpO2 BMI Smoking Status 237 lb (107.5 kg) 97% 33.05 kg/m2 Former Smoker Vitals History BMI and BSA Data Body Mass Index Body Surface Area 33.05 kg/m 2 2.32 m 2 Preferred Pharmacy Pharmacy Name Phone [id number] Dobson, [address] [phone] Your Updated Medication List  
  
   
This list is accurate as of 9/5/18 10:58 AM.  Always use your most recent med list.  
  
  
  
  
 furosemide 80 mg tablet Commonly known as:  LASIX Take 1 Tab by mouth daily. lactulose 10 gram/15 mL solution Commonly known as:  [place] Take 20 g by mouth three (3) times daily. Indications: Hepatic Encephalopathy  
  
 spironolactone 100 mg tablet Commonly known as:  ALDACTONE Take 2 Tabs by mouth daily. Prescriptions Sent to Pharmacy Refills  
 furosemide (LASIX) 80 mg tablet 3 Sig: Take 1 Tab by mouth daily. Class: Normal  
 Pharmacy: [id number] - [address] [facility] [address] Ph #: [phone] Route: Oral  
 spironolactone (ALDACTONE) 100 mg tablet 3 Sig: Take 2 Tabs by mouth daily. Class: Normal  
 Pharmacy: [id number] - [address] [facility] [address] Ph #: [phone] Route: Oral  
  
Follow-up Instructions Return in about 8 weeks (around 10/31/2018). To-Do List   
 09/05/2018 Lab:  AFP WITH AFP-L3%   
  
 09/05/2018 Lab:  AMMONIA   
  
 09/05/2018 Lab:  CBC WITH AUTOMATED DIFF   
  
 09/05/2018 Lab:  HEPATIC FUNCTION PANEL   
  
 09/05/2018 Lab:  LIPID PANEL   
  
 09/05/2018 Lab:  METABOLIC PANEL, BASIC   
  
 09/05/2018 Lab:  PROTHROMBIN TIME + INR Introducing [facility]! Dear Clarice Centeno: Thank you for requesting a [employer] account. Our records indicate that you already have an active C[employer]fm account. You can access your account anytime at [email or web address]. [email or web address] Did you know that you can access your hospital and ER discharge instructions at any time in [employer]? You can also review all of your test results from your hospital stay or ER visit. Additional Information If you have questions, please visit the Frequently Asked Questions section of the [employer] website at [email or web address] Remember, [employer] is NOT to be used for urgent needs. For medical emergencies, dial 911. Now available from your iPhone and Android! Please provide this summary of care documentation to your next provider. Your primary care clinician is listed as Ezra Rashid. If you have any questions after today's visit, please call [phone].

## 2018-09-06 LAB
AFP L3 MFR SERPL: 6.1 % (ref 0–9.9)
AFP SERPL-MCNC: 9.5 NG/ML (ref 0–8)

## 2018-09-17 ENCOUNTER — TELEPHONE (OUTPATIENT)
Dept: HEMATOLOGY | Age: 63
End: 2018-09-17

## 2018-09-17 NOTE — TELEPHONE ENCOUNTER
Returned patient call. No answer and the voice mail box has not been set up. Unable to leave message.

## 2018-10-12 ENCOUNTER — ANESTHESIA EVENT (OUTPATIENT)
Dept: ENDOSCOPY | Age: 63
End: 2018-10-12
Payer: MEDICARE

## 2018-10-15 ENCOUNTER — HOSPITAL ENCOUNTER (OUTPATIENT)
Age: 63
Setting detail: OUTPATIENT SURGERY
Discharge: HOME | End: 2018-10-15
Attending: INTERNAL MEDICINE | Admitting: INTERNAL MEDICINE
Payer: MEDICARE

## 2018-10-15 ENCOUNTER — ANESTHESIA (OUTPATIENT)
Dept: ENDOSCOPY | Age: 63
End: 2018-10-15
Payer: MEDICARE

## 2018-10-15 VITALS
SYSTOLIC BLOOD PRESSURE: 100 MMHG | OXYGEN SATURATION: 99 % | DIASTOLIC BLOOD PRESSURE: 58 MMHG | RESPIRATION RATE: 18 BRPM | HEIGHT: 71 IN | BODY MASS INDEX: 32.82 KG/M2 | WEIGHT: 234.44 LBS | HEART RATE: 75 BPM | TEMPERATURE: 97.5 F

## 2018-10-15 LAB — ETHANOL SERPL-MCNC: <3 MG/DL (ref 0–3)

## 2018-10-15 PROCEDURE — 77030038020 HC MANFLD NEPTUNE STRY -B: Performed by: INTERNAL MEDICINE

## 2018-10-15 PROCEDURE — 88305 TISSUE EXAM BY PATHOLOGIST: CPT | Performed by: INTERNAL MEDICINE

## 2018-10-15 PROCEDURE — 74011000250 HC RX REV CODE- 250

## 2018-10-15 PROCEDURE — 76040000019: Performed by: INTERNAL MEDICINE

## 2018-10-15 PROCEDURE — 74011250636 HC RX REV CODE- 250/636

## 2018-10-15 PROCEDURE — 76060000031 HC ANESTHESIA FIRST 0.5 HR: Performed by: INTERNAL MEDICINE

## 2018-10-15 PROCEDURE — 74011250636 HC RX REV CODE- 250/636: Performed by: INTERNAL MEDICINE

## 2018-10-15 PROCEDURE — 77030009426 HC FCPS BIOP ENDOSC BSC -B: Performed by: INTERNAL MEDICINE

## 2018-10-15 PROCEDURE — 80307 DRUG TEST PRSMV CHEM ANLYZR: CPT | Performed by: ANESTHESIOLOGY

## 2018-10-15 PROCEDURE — 36415 COLL VENOUS BLD VENIPUNCTURE: CPT | Performed by: ANESTHESIOLOGY

## 2018-10-15 RX ORDER — MIDAZOLAM HYDROCHLORIDE 1 MG/ML
INJECTION, SOLUTION INTRAMUSCULAR; INTRAVENOUS
Status: DISCONTINUED
Start: 2018-10-15 | End: 2018-10-15 | Stop reason: HOSPADM

## 2018-10-15 RX ORDER — SODIUM CHLORIDE, SODIUM LACTATE, POTASSIUM CHLORIDE, CALCIUM CHLORIDE 600; 310; 30; 20 MG/100ML; MG/100ML; MG/100ML; MG/100ML
100 INJECTION, SOLUTION INTRAVENOUS CONTINUOUS
Status: CANCELLED | OUTPATIENT
Start: 2018-10-15

## 2018-10-15 RX ORDER — NALOXONE HYDROCHLORIDE 0.4 MG/ML
0.4 INJECTION, SOLUTION INTRAMUSCULAR; INTRAVENOUS; SUBCUTANEOUS AS NEEDED
Status: CANCELLED | OUTPATIENT
Start: 2018-10-15

## 2018-10-15 RX ORDER — DEXTROMETHORPHAN/PSEUDOEPHED 2.5-7.5/.8
1.2 DROPS ORAL
Status: CANCELLED | OUTPATIENT
Start: 2018-10-15

## 2018-10-15 RX ORDER — SODIUM CHLORIDE, SODIUM LACTATE, POTASSIUM CHLORIDE, CALCIUM CHLORIDE 600; 310; 30; 20 MG/100ML; MG/100ML; MG/100ML; MG/100ML
125 INJECTION, SOLUTION INTRAVENOUS CONTINUOUS
Status: DISCONTINUED | OUTPATIENT
Start: 2018-10-15 | End: 2018-10-15 | Stop reason: HOSPADM

## 2018-10-15 RX ORDER — FENTANYL CITRATE 50 UG/ML
50 INJECTION, SOLUTION INTRAMUSCULAR; INTRAVENOUS
Status: CANCELLED | OUTPATIENT
Start: 2018-10-15

## 2018-10-15 RX ORDER — ONDANSETRON 2 MG/ML
INJECTION INTRAMUSCULAR; INTRAVENOUS AS NEEDED
Status: DISCONTINUED | OUTPATIENT
Start: 2018-10-15 | End: 2018-10-15 | Stop reason: HOSPADM

## 2018-10-15 RX ORDER — SODIUM CHLORIDE 0.9 % (FLUSH) 0.9 %
5-10 SYRINGE (ML) INJECTION EVERY 8 HOURS
Status: CANCELLED | OUTPATIENT
Start: 2018-10-15 | End: 2018-10-15

## 2018-10-15 RX ORDER — ATROPINE SULFATE 0.1 MG/ML
0.5 INJECTION INTRAVENOUS
Status: CANCELLED | OUTPATIENT
Start: 2018-10-15 | End: 2018-10-16

## 2018-10-15 RX ORDER — FENTANYL CITRATE 50 UG/ML
INJECTION, SOLUTION INTRAMUSCULAR; INTRAVENOUS AS NEEDED
Status: DISCONTINUED | OUTPATIENT
Start: 2018-10-15 | End: 2018-10-15 | Stop reason: HOSPADM

## 2018-10-15 RX ORDER — TRAZODONE HCL
50 POWDER (GRAM) MISCELLANEOUS
Status: ON HOLD | COMMUNITY
End: 2019-02-01 | Stop reason: CLARIF

## 2018-10-15 RX ORDER — EPINEPHRINE 0.1 MG/ML
1 INJECTION INTRACARDIAC; INTRAVENOUS
Status: CANCELLED | OUTPATIENT
Start: 2018-10-15 | End: 2018-10-16

## 2018-10-15 RX ORDER — MIDAZOLAM HYDROCHLORIDE 1 MG/ML
INJECTION, SOLUTION INTRAMUSCULAR; INTRAVENOUS AS NEEDED
Status: DISCONTINUED | OUTPATIENT
Start: 2018-10-15 | End: 2018-10-15 | Stop reason: HOSPADM

## 2018-10-15 RX ORDER — ONDANSETRON 2 MG/ML
4 INJECTION INTRAMUSCULAR; INTRAVENOUS ONCE
Status: CANCELLED | OUTPATIENT
Start: 2018-10-15 | End: 2018-10-15

## 2018-10-15 RX ORDER — SODIUM CHLORIDE 0.9 % (FLUSH) 0.9 %
5-10 SYRINGE (ML) INJECTION AS NEEDED
Status: CANCELLED | OUTPATIENT
Start: 2018-10-15 | End: 2018-10-15

## 2018-10-15 RX ORDER — FENTANYL CITRATE 50 UG/ML
INJECTION, SOLUTION INTRAMUSCULAR; INTRAVENOUS
Status: COMPLETED
Start: 2018-10-15 | End: 2018-10-15

## 2018-10-15 RX ORDER — SODIUM CHLORIDE 9 MG/ML
100 INJECTION, SOLUTION INTRAVENOUS CONTINUOUS
Status: CANCELLED | OUTPATIENT
Start: 2018-10-15 | End: 2018-10-15

## 2018-10-15 RX ORDER — FLUMAZENIL 0.1 MG/ML
0.2 INJECTION INTRAVENOUS
Status: CANCELLED | OUTPATIENT
Start: 2018-10-15 | End: 2018-10-15

## 2018-10-15 RX ORDER — NALOXONE HYDROCHLORIDE 0.4 MG/ML
0.4 INJECTION, SOLUTION INTRAMUSCULAR; INTRAVENOUS; SUBCUTANEOUS
Status: CANCELLED | OUTPATIENT
Start: 2018-10-15 | End: 2018-10-15

## 2018-10-15 RX ORDER — LIDOCAINE HYDROCHLORIDE 20 MG/ML
INJECTION, SOLUTION EPIDURAL; INFILTRATION; INTRACAUDAL; PERINEURAL AS NEEDED
Status: DISCONTINUED | OUTPATIENT
Start: 2018-10-15 | End: 2018-10-15 | Stop reason: HOSPADM

## 2018-10-15 RX ORDER — OXYCODONE HYDROCHLORIDE 5 MG/1
5 TABLET ORAL
Status: DISCONTINUED | OUTPATIENT
Start: 2018-10-15 | End: 2018-10-15 | Stop reason: HOSPADM

## 2018-10-15 RX ORDER — PROPOFOL 10 MG/ML
INJECTION, EMULSION INTRAVENOUS AS NEEDED
Status: DISCONTINUED | OUTPATIENT
Start: 2018-10-15 | End: 2018-10-15 | Stop reason: HOSPADM

## 2018-10-15 RX ADMIN — MIDAZOLAM HYDROCHLORIDE 2 MG: 1 INJECTION, SOLUTION INTRAMUSCULAR; INTRAVENOUS at 09:30

## 2018-10-15 RX ADMIN — PROPOFOL 50 MG: 10 INJECTION, EMULSION INTRAVENOUS at 09:43

## 2018-10-15 RX ADMIN — PROPOFOL 30 MG: 10 INJECTION, EMULSION INTRAVENOUS at 09:40

## 2018-10-15 RX ADMIN — ONDANSETRON 4 MG: 2 INJECTION INTRAMUSCULAR; INTRAVENOUS at 09:32

## 2018-10-15 RX ADMIN — FENTANYL CITRATE 100 MCG: 50 INJECTION, SOLUTION INTRAMUSCULAR; INTRAVENOUS at 09:30

## 2018-10-15 RX ADMIN — SODIUM CHLORIDE, SODIUM LACTATE, POTASSIUM CHLORIDE, AND CALCIUM CHLORIDE 125 ML/HR: 600; 310; 30; 20 INJECTION, SOLUTION INTRAVENOUS at 08:36

## 2018-10-15 RX ADMIN — PROPOFOL 50 MG: 10 INJECTION, EMULSION INTRAVENOUS at 09:37

## 2018-10-15 RX ADMIN — PROPOFOL 30 MG: 10 INJECTION, EMULSION INTRAVENOUS at 09:34

## 2018-10-15 RX ADMIN — LIDOCAINE HYDROCHLORIDE 40 MG: 20 INJECTION, SOLUTION EPIDURAL; INFILTRATION; INTRACAUDAL; PERINEURAL at 09:34

## 2018-10-15 NOTE — ANESTHESIA POSTPROCEDURE EVALUATION
Post-Anesthesia Evaluation & Assessment Visit Vitals  BP 99/58  Pulse 78  Temp 36.4 °C (97.6 °F)  Resp 18  Ht 5' 11\" (1.803 m)  Wt 106.3 kg (234 lb 7 oz)  SpO2 96%  BMI 32.7 kg/m2 Nausea/Vomiting: Controlled. Post-operative hydration adequate. Pain Scale 1: Numeric (0 - 10) (10/15/18 1024) Pain Intensity 1: 0 (10/15/18 1024) Managed Pain score at or below stated goal level. Mental status & Level of consciousness: alert and oriented x 3 Neurological status: moves all extremities, sensation grossly intact Pulmonary status: airway patent, adequate oxygenation. Complications related to anesthesia: none Patient has met all PACU discharge requirements.  
 
 
Lopez Ward,

## 2018-10-15 NOTE — PROCEDURES
3340 [facility], MD, [address], [facility], [place] [facility], [facility]       Jose Antonio Pena, MEJIA Sanchez, DAISY White, [facility]   Dony Lozoya, MEJIA Nagel, MEJIA Salazar [facility] [address]    at [address], [address] Radha Guan  22.    [phone]    FAX: [address] [place] [address], [address]    [phone]    FAX: [phone]         UPPER ENDOSCOPY PROCEDURE NOTE    Amara Fierro  1955    INDICATION: Cirrhosis. Screening for esophageal varices with variceal ligation if  indicated. : Martha Mandujano MD    ANESTHESIA/SEDATION: Propofol was administered by anesthesia      PROCEDURE DESCRIPTION:  Inf[employer] consent was obtained from the patient for the procedure. All risks and benefits of the procedure explained. The patient was taken to the endoscopy suite and placed in the left lateral decubitus position. Following sequential administration of sedation to doses as indicated above the endoscope was inserted into the mouth and advanced under direct vision to the second portion of the duodenum. Careful inspection of upper gastrointestinal tract was made as the endoscope was inserted and withdrawn. Retroflexion of the endoscope to view of the cardia of the stomach was performed. The findings and interventions are described below. FINDINGS:  Esophagus:    Normal.    Scars of previous banding    Stomach: Moderate portal hypertensive gastropathy of the body of the, stomach. Gastritis of the antrum,   No gastric varices identified. Duodenum:   Normal bulb and second portion    INTERVENTION:   2 biopsies were taken from the antrum.   The specimens were placed in preservative and transported to Pathology after the procedure. COMPLICATIONS: None. The patient tolerated the procedure well. EBL: Negligible. RECOMMENDATIONS:  Observe until discharge parameters are achieved. May resume previous diet. Repeat endoscopy to reassess varices and need for banding in 2 years. Follow-up [facility] office as scheduled.       Talon Sutherland MD  10/15/2018  9:47 AM

## 2018-10-15 NOTE — ANESTHESIA PREPROCEDURE EVALUATION
Anesthetic History No history of anesthetic complications Review of Systems / Medical History Patient summary reviewed, nursing notes reviewed and pertinent labs reviewed Pulmonary Sleep apnea: No treatment Smoker Pertinent negatives: No COPD, asthma and recent URI Comments: Pt abstained smoking DOS Neuro/Psych Within defined limits Cardiovascular Exercise tolerance: >4 METS 
  
GI/Hepatic/Renal 
  
GERD: well controlled Hepatitis: type C Liver disease Pertinent negatives: No renal disease Comments: cirrhosis Endo/Other Obesity Pertinent negatives: No diabetes, hypothyroidism, hyperthyroidism, morbid obesity and blood dyscrasia Other Findings Physical Exam 
 
Airway Mallampati: III 
TM Distance: 4 - 6 cm Neck ROM: normal range of motion Mouth opening: Normal 
 
 Cardiovascular Regular rate and rhythm,  S1 and S2 normal,  no murmur, click, rub, or gallop Dental 
 
 
Comments: Missing lower left molar Pulmonary Breath sounds clear to auscultation Abdominal 
GI exam deferred Other Findings Anesthetic Plan ASA: 4 Anesthesia type: MAC Induction: Intravenous Anesthetic plan and risks discussed with: Patient and Family

## 2018-10-15 NOTE — DISCHARGE INSTRUCTIONS
3340 [facility], [place], [place], [place], [place]       Erminio Abelson, NP Claudene Lobo, PA-C Burnett Peach, [facility]   Wiley Hamman, MEJIA Thurston Seg[address]    at [address], [address] Radha Guan  22.    [phone]    FAX: [address] [place] [address], [address]    [phone]    FAX: [phone]         ENDOSCOPY DISCHARGE INSTRUCTIONS      Mahamed Rusty  [birth year]  Date: 10/15/2018    DISCOMFORT:  Use lozenges or warm salt water gargle for sore thoat  Apply warm compress to IV site if red. If redness or soreness persists call the office. You may experience gas and bloating. Walking and belching will help relieve this. You may experience chest discomfort or pressure especially if banding of esophageal varices was performed. DIET:  You may resume your normal diet. ACTIVITY:  Spend the remainder of the day resting. Avoid any strenuous activity. You may not operate a vehicle for 12 hours. You may not engage in an occupation involving machinery or appliances for rest of today. Avoid making any critical or financial decisions for at least 24 hour. Call the [employer] of [address] [place] [address] if you have any of the following:  Increasing chest or abdominal pain, nausea, vomiting, vomiting blood, abdominal distension or swelling, fever or chills, bloody discharge from nose or mouth or shortness of breath. Follow-up Instructions:  Call Dr. Fe Ramos for any questions or problems at the phone number listed above. If a biopsy was performed, you will be contacted by the office staff or Dr Fe Ramos within 1 week.    If you have not heard from us by then you may call the office at the phone number listed above to inquire about the results. ENDOSCOPY FINDINGS:  Your endoscopy demonstrated swelling of the stomach. A biopsy of the stomach was taken. DISCHARGE SUMMARY from the Nurse: The following personal items collected during your admission are returned to you:   Dental Appliance: Dental Appliances: None  Vision:    Hearing Aid:    Jewelry: Jewelry: None  Clothing: Clothing:  (locker 13)  Other Valuables: Other Valuables: None  Valuables sent to safe:              DISCHARGE SUMMARY from Nurse    PATIENT INSTRUCTIONS:    After general anesthesia or intravenous sedation, for 24 hours or while taking prescription Narcotics:  · Limit your activities  · Do not drive and operate hazardous machinery  · Do not make important personal or business decisions  · Do  not drink alcoholic beverages  · If you have not urinated within 8 hours after discharge, please contact your surgeon on call. Report the following to your surgeon:  · Excessive pain, swelling, redness or odor of or around the surgical area  · Temperature over 100.5  · Nausea and vomiting lasting longer than 4 hours or if unable to take medications  · Any signs of decreased circulation or nerve impairment to extremity: change in color, persistent  numbness, tingling, coldness or increase pain  · Any questions    What to do at Home:  Recommended activity: Ambulate in house,     If you experience any of the following symptoms above, please follow up with Dr. Caridad Mendoza. *  Please give a list of your current medications to your Primary Care Provider. *  Please update this list whenever your medications are discontinued, doses are      changed, or new medications (including over-the-counter products) are added. *  Please carry medication information at all times in case of emergency situations.     These are general instructions for a healthy lifestyle:    No smoking/ No tobacco products/ Avoid exposure to second hand smoke  Surgeon Sarah Miller Warning:  Quitting smoking now greatly reduces serious risk to your health. Obesity, smoking, and sedentary lifestyle greatly increases your risk for illness    A healthy diet, regular physical exercise & weight monitoring are important for maintaining a healthy lifestyle    You may be retaining fluid if you have a history of heart failure or if you experience any of the following symptoms:  Weight gain of 3 pounds or more overnight or 5 pounds in a week, increased swelling in our hands or feet or shortness of breath while lying flat in bed. Please call your doctor as soon as you notice any of these symptoms; do not wait until your next office visit. Recognize signs and symptoms of STROKE:    F-face looks uneven    A-arms unable to move or move unevenly    S-speech slurred or non-existent    T-time-call 911 as soon as signs and symptoms begin-DO NOT go       Back to bed or wait to see if you get better-TIME IS BRAIN. Warning Signs of HEART ATTACK     Call 911 if you have these symptoms:   Chest discomfort. Most heart attacks involve discomfort in the center of the chest that lasts more than a few minutes, or that goes away and comes back. It can feel like uncomfortable pressure, squeezing, fullness, or pain.  Discomfort in other areas of the upper body. Symptoms can include pain or discomfort in one or both arms, the back, neck, jaw, or stomach.  Shortness of breath with or without chest discomfort.  Other signs may include breaking out in a cold sweat, nausea, or lightheadedness. Don't wait more than five minutes to call 911 - MINUTES MATTER! Fast action can save your life. Calling 911 is almost always the fastest way to get lifesaving treatment. Emergency Medical Services staff can begin treatment when they arrive -- up to an hour sooner than if someone gets to the hospital by car.      Patient armband removed and shredded    The discharge information has been reviewed with the patient and caregiver. The patient and caregiver verbalized understanding. Discharge medications reviewed with the patient and caregiver and appropriate educational materials and side effects teaching were provided.   ___________________________________________________________________________________________________________________________________

## 2018-10-15 NOTE — H&P
3340 [facility], [place], [address], [place], [place]       Shruthi French, DAISY Coleman, NADIA-MAN Rodriguez, NP Elvera Hodgkins, NP [address] [facility] 136    at [address], [address] Radha Guan  22.    [phone]    FAX: [address]    at [address], [address] [facility], [address]    [phone]    FAX: [phone]         PRE-PROCEDURE NOTE - EGD    H and P from last office visit reviewed. Allergies reviewed. Out-patient medicaton list reviewed. 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 Current Outpatient Prescriptions on File Prior to Encounter   Medication Sig Dispense Refill    lactulose (CHRONULAC) 10 gram/15 mL solution Take 20 g by mouth three (3) times daily. Indications: Hepatic Encephalopathy         For EGD to assess for esophageal and gastric varices. Plan to perform banding if indicated based upon variceal size and appearance. The risks of the procedure were discussed with the patient. These included reaction to anesthesia, pain, perforation and bleeding. All questions were answered.   The patient wishes to proceed with the procedure. PHYSICAL EXAMINATION:  VS: per nursing note  General: No acute distress. Eyes: Sclera anicteric. ENT: No oral lesions. Thyroid normal.  Nodes: No adenopathy. Skin: No spider angiomata. No jaundice. No palmar erythema. Respiratory: Lungs clear to auscultation. Cardiovascular: Regular heart rate. No murmurs. No JVD. Abdomen: Soft non-tender, liver size normal to percussion/palpation. Spleen not palpable. No obvious ascites. Extremities: No edema. No muscle wasting. No gross arthritic changes. Neurologic: Alert and oriented. Cranial nerves grossly intact. No asterixis. MOST RECENT LABORATORY STUDIES:  Lab Results   Component Value Date/Time    WBC 5.9 09/05/2018 11:14 AM    WBC 10.2 06/20/2012 12:00 AM    HGB 14.4 09/05/2018 11:14 AM    HCT 40.1 09/05/2018 11:14 AM    PLATELET 52 (L) 72/44/1514 11:14 AM    MCV 94.1 09/05/2018 11:14 AM     Lab Results   Component Value Date/Time    INR 1.3 (H) 09/05/2018 11:14 AM    INR 1.3 (H) 07/24/2018 12:12 PM    INR 1.3 (H) 03/06/2017 11:46 AM    Prothrombin time 16.3 (H) 09/05/2018 11:14 AM    Prothrombin time 15.8 (H) 07/24/2018 12:12 PM    Prothrombin time 15.4 (H) 03/06/2017 11:46 AM       ASSESSMENT AND PLAN:  EGD to assess for esophageal and/or gastric varices. Conscious sedation with fentanyl and versed.     Tram Lozoya MD  [address]  4 [facility], [address] [place] [address] [place], [address]  12 [facility]

## 2018-10-15 NOTE — PERIOP NOTES
Discharge instructions completed - opportunity for questions - denies c/o pain - vss - AVS med list reviewed for duplicates

## 2018-10-15 NOTE — IP AVS SNAPSHOT
303 [facility] 
 
 
 [address] [phone] Patient: Valarie Ambrosio MRN: [id number] Harper Larry About your hospitalization You were admitted on:  October 15, 2018 You last received care in the:  [address] [place] [address] You were discharged on:  October 15, 2018 Why you were hospitalized Your primary diagnosis was:  Not on File Follow-up Information Follow up With Details Comments Contact Info MD Lee Cheathamjesse [address] [facility] 
[phone] Kay Nicolas MD   [address] [facility] 313 1000 [facility] [address] [phone] Your Scheduled Appointments Monday October 29, 2018  8:00 AM EDT [place] [address] [facility] ([address])  
 [place] [address] [address] [facility] [address] [phone] Discharge Orders None A check meir indicates which time of day the medication should be taken. My Medications CONTINUE taking these medications Instructions Each Dose to Equal  
 Morning Noon Evening Bedtime  
 furosemide 80 mg tablet Commonly known as:  LASIX Your last dose was: Your next dose is: Take 1 Tab by mouth daily. 80 mg  
    
   
   
   
  
 lactulose 10 gram/15 mL solution Commonly known as:  Katie Nailer Your last dose was: Your next dose is: Take 20 g by mouth three (3) times daily. Indications: Hepatic Encephalopathy 20 g  
    
   
   
   
  
 spironolactone 100 mg tablet Commonly known as:  ALDACTONE Your last dose was: Your next dose is: Take 2 Tabs by mouth daily. 200 mg  
    
   
   
   
  
 TRAZODONE (BULK) Your last dose was: Your next dose is:    
   
   
 50 mg by Does Not Apply route.   
 50 mg  
    
   
   
   
  
  
  
  
 Discharge Instructions [address] 137 [facility] Julianna Oswald MD, Miguelito Al FAASLD MEJIA Frederick PA-C Secundino Book, [facility]   MEJIA Childress NP [facility] 136 
  at [address], [facility] Radha Guan  22. 
  [phone] FAX: [address] 
  [facility] 
  [address], [address] 98 [facility], [address] 
  [phone] FAX: [phone] ENDOSCOPY DISCHARGE INSTRUCTIONS Kenji Abdiaziz 1955 Date: 10/15/2018 DISCOMFORT: 
Use lozenges or warm salt water gargle for sore thoat Apply warm compress to IV site if red. If redness or soreness persists call the office. You may experience gas and bloating. Walking and belching will help relieve this. You may experience chest discomfort or pressure especially if banding of esophageal varices was performed. DIET: 
You may resume your normal diet. ACTIVITY: 
Spend the remainder of the day resting. Avoid any strenuous activity. You may not operate a vehicle for 12 hours. You may not engage in an occupation involving machinery or appliances for rest of today. Avoid making any critical or financial decisions for at least 24 hour. Call the [employer] of [address] [place] [address] if you have any of the following: 
Increasing chest or abdominal pain, nausea, vomiting, vomiting blood, abdominal distension or swelling, fever or chills, bloody discharge from nose or mouth or shortness of breath. Follow-up Instructions: 
Call Dr. Eliza Song for any questions or problems at the phone number listed above. If a biopsy was performed, you will be contacted by the office staff or Dr Eliza Song within 1 week. If you have not heard from us by then you may call the office at the phone number listed above to inquire about the results. ENDOSCOPY FINDINGS: 
Your endoscopy demonstrated swelling of the stomach. A biopsy of the stomach was taken. DISCHARGE SUMMARY from the Nurse: The following personal items collected during your admission are returned to you:  
Dental Appliance: Dental Appliances: None Vision:   
Hearing Aid:   
Jewelry: Jewelry: None Clothing: Clothing:  (locker 13) Other Valuables: Other Valuables: None Valuables sent to safe:   
 
 
 
 
 
DISCHARGE SUMMARY from Nurse PATIENT INSTRUCTIONS: 
 
 
F-face looks uneven A-arms unable to move or move unevenly S-speech slurred or non-existent T-time-call 911 as soon as signs and symptoms begin-DO NOT go Back to bed or wait to see if you get better-TIME IS BRAIN. Warning Signs of HEART ATTACK Call 911 if you have these symptoms: 
? Chest discomfort. Most heart attacks involve discomfort in the center of the chest that lasts more than a few minutes, or that goes away and comes back. It can feel like uncomfortable pressure, squeezing, fullness, or pain. ? Discomfort in other areas of the upper body. Symptoms can include pain or discomfort in one or both arms, the back, neck, jaw, or stomach. ? Shortness of breath with or without chest discomfort. ? Other signs may include breaking out in a cold sweat, nausea, or lightheadedness. Don't wait more than five minutes to call [address]! Fast action can save your life. Calling 911 is almost always the fastest way to get lifesaving treatment. Emergency Medical Services staff can begin treatment when they arrive  up to an hour sooner than if someone gets to the hospital by car. Patient armband removed and shredded The discharge information has been reviewed with the patient and caregiver. The patient and caregiver verbalized understanding. Discharge medications reviewed with the patient and caregiver and appropriate educational materials and side effects teaching were provided. ___________________________________________________________________________________________________________________________________ Introducing [facility]! Dear Quita Franklin: Thank you for requesting a [employer] account. Our records indicate that you already have an active [employer] account. You can access your account anytime at [email or web address] Did you know that you can access your hospital and ER discharge instructions at any time in [employer]? You can also review all of your test results from your hospital stay or ER visit. Additional Information If you have questions, please visit the Frequently Asked Questions section of the [employer] website at [email or web address] Remember, [employer] is NOT to be used for urgent needs. For medical emergencies, dial 911. Now available from your iPhone and Android! Introducing [employer]ir As a [facility]lanca Bull patient, I wanted to make you aware of our electronic visit tool called [employer]et[employer] Shelbynati. [facility] 24/7 allows you to connect within minutes with a medical provider 24 hours a day, seven days a week via a mobile device or tablet or logging into a secure website from your computer. You can access Cayetano Weir from anywhere in the [place].  
 
A virtual visit might be right for you when you have a simple condition and feel like you just dont want to get out of bed, or cant get away from work for an appointment, when your regular [facility]li Burow provider is not available (evenings, weekends or holidays), or when youre out of town and need minor care. Electronic visits cost only $49 and if the [employer] 24/7 provider determines a prescription is needed to treat your condition, one can be electronically transmitted to a nearby pharmacy*. Please take a moment to enroll today if you have not already done so. The enrollment process is free and takes just a few minutes. To enroll, please download the [employer] 24/7 liane to your tablet or phone, or visit [email or web address] to enroll on your computer. And, as an [address] [place] [address] patient with a [employer]Books account, the results of your visits will be scanned into your electronic medical record and your primary care provider will be able to view the scanned results. We urge you to continue to see your regular [employer] provider for your ongoing medical care. And while your primary care provider may not be the one available when you seek a [employer] virtual visit, the peace of mind you get from getting a real diagnosis real time can be priceless. For more information on [employer], view our Frequently Asked Questions (FAQs) at [email or web address]. Sincerely, 
 
Renee Harden MD 
Chief Medical Officer [facility] Misty Abdalla *:  certain medications cannot be prescribed via [employer] Providers Seen During Your Hospitalization Provider Specialty Primary office phone Arely Reich MD Hepatology [phone] Your Primary Care Physician (PCP) Primary Care Physician Office Phone Office Fax Mil Gill [phone] You are allergic to the following Allergen Reactions Aspirin Other (comments) Bleeding Valium (Diazepam) Swelling Recent Documentation Height Weight BMI Smoking Status 1.803 m 106.3 kg 32.7 kg/m2 Light Tobacco Smoker Emergency Contacts Name Discharge Info Relation Home Work Mobile Jazmyne Montero CAREGIVER [3] Daughter [21]   [phone] Srinath Benson DISCHARGE CAREGIVER [3] Other Relative [6] [phone] Patient Belongings The following personal items are in your possession at time of discharge: 
  Dental Appliances: None         Home Medications: None   Jewelry: None  Clothing:  (locker 13)    Other Valuables: None Please provide this summary of care documentation to your next provider. Signatures-by signing, you are acknowledging that this After Visit Summary has been reviewed with you and you have received a copy. Patient Signature:  ____________________________________________________________ Date:  ____________________________________________________________  
  
Radha Lightning Provider Signature:  ____________________________________________________________ Date:  ____________________________________________________________

## 2018-10-29 ENCOUNTER — OFFICE VISIT (OUTPATIENT)
Dept: HEMATOLOGY | Age: 63
End: 2018-10-29

## 2018-10-29 ENCOUNTER — HOSPITAL ENCOUNTER (OUTPATIENT)
Dept: LAB | Age: 63
Discharge: HOME | End: 2018-10-29
Payer: MEDICARE

## 2018-10-29 VITALS
DIASTOLIC BLOOD PRESSURE: 69 MMHG | HEART RATE: 82 BPM | WEIGHT: 234.6 LBS | SYSTOLIC BLOOD PRESSURE: 117 MMHG | BODY MASS INDEX: 32.84 KG/M2 | HEIGHT: 71 IN | OXYGEN SATURATION: 97 % | RESPIRATION RATE: 20 BRPM | TEMPERATURE: 97.1 F

## 2018-10-29 DIAGNOSIS — K74.60 CIRRHOSIS OF LIVER WITHOUT ASCITES, UNSPECIFIED HEPATIC CIRRHOSIS TYPE (HCC): ICD-10-CM

## 2018-10-29 DIAGNOSIS — K74.60 CIRRHOSIS OF LIVER WITHOUT ASCITES, UNSPECIFIED HEPATIC CIRRHOSIS TYPE (HCC): Primary | ICD-10-CM

## 2018-10-29 LAB
ALBUMIN SERPL-MCNC: 3 G/DL (ref 3.4–5)
ALBUMIN/GLOB SERPL: 0.8 {RATIO} (ref 0.8–1.7)
ALP SERPL-CCNC: 87 U/L (ref 45–117)
ALT SERPL-CCNC: 37 U/L (ref 16–61)
AMMONIA PLAS-SCNC: 34 UMOL/L (ref 11–32)
ANION GAP SERPL CALC-SCNC: 12 MMOL/L (ref 3–18)
AST SERPL-CCNC: 58 U/L (ref 15–37)
BASOPHILS # BLD: 0 K/UL (ref 0–0.1)
BASOPHILS NFR BLD: 1 % (ref 0–2)
BILIRUB DIRECT SERPL-MCNC: 0.8 MG/DL (ref 0–0.2)
BILIRUB SERPL-MCNC: 2 MG/DL (ref 0.2–1)
BUN SERPL-MCNC: 10 MG/DL (ref 7–18)
BUN/CREAT SERPL: 12
CALCIUM SERPL-MCNC: 8.6 MG/DL (ref 8.5–10.1)
CHLORIDE SERPL-SCNC: 102 MMOL/L (ref 100–108)
CO2 SERPL-SCNC: 26 MMOL/L (ref 21–32)
CREAT SERPL-MCNC: 0.85 MG/DL (ref 0.6–1.3)
DIFFERENTIAL METHOD BLD: ABNORMAL
EOSINOPHIL # BLD: 0.2 K/UL (ref 0–0.4)
EOSINOPHIL NFR BLD: 5 % (ref 0–5)
ERYTHROCYTE [DISTWIDTH] IN BLOOD BY AUTOMATED COUNT: 15.2 % (ref 11.6–14.5)
GLOBULIN SER CALC-MCNC: 3.8 G/DL (ref 2–4)
GLUCOSE SERPL-MCNC: 87 MG/DL (ref 74–99)
HCT VFR BLD AUTO: 38.8 % (ref 36–48)
HGB BLD-MCNC: 13.3 G/DL (ref 13–16)
INR PPP: 1.5 (ref 0.8–1.2)
LYMPHOCYTES # BLD: 0.4 K/UL (ref 0.9–3.6)
LYMPHOCYTES NFR BLD: 10 % (ref 21–52)
MCH RBC QN AUTO: 35.1 PG (ref 24–34)
MCHC RBC AUTO-ENTMCNC: 34.3 G/DL (ref 31–37)
MCV RBC AUTO: 102.4 FL (ref 74–97)
MONOCYTES # BLD: 0.4 K/UL (ref 0.05–1.2)
MONOCYTES NFR BLD: 10 % (ref 3–10)
NEUTS SEG # BLD: 3 K/UL (ref 1.8–8)
NEUTS SEG NFR BLD: 74 % (ref 40–73)
PLATELET # BLD AUTO: 49 K/UL (ref 135–420)
PMV BLD AUTO: 9.9 FL (ref 9.2–11.8)
POTASSIUM SERPL-SCNC: 3.6 MMOL/L (ref 3.5–5.5)
PROT SERPL-MCNC: 6.8 G/DL (ref 6.4–8.2)
PROTHROMBIN TIME: 17.6 SEC (ref 11.5–15.2)
RBC # BLD AUTO: 3.79 M/UL (ref 4.7–5.5)
SODIUM SERPL-SCNC: 140 MMOL/L (ref 136–145)
WBC # BLD AUTO: 4 K/UL (ref 4.6–13.2)

## 2018-10-29 PROCEDURE — 80048 BASIC METABOLIC PNL TOTAL CA: CPT | Performed by: NURSE PRACTITIONER

## 2018-10-29 PROCEDURE — 85025 COMPLETE CBC W/AUTO DIFF WBC: CPT | Performed by: NURSE PRACTITIONER

## 2018-10-29 PROCEDURE — 85610 PROTHROMBIN TIME: CPT | Performed by: NURSE PRACTITIONER

## 2018-10-29 PROCEDURE — 80076 HEPATIC FUNCTION PANEL: CPT | Performed by: NURSE PRACTITIONER

## 2018-10-29 PROCEDURE — 82107 ALPHA-FETOPROTEIN L3: CPT | Performed by: NURSE PRACTITIONER

## 2018-10-29 PROCEDURE — 87521 HEPATITIS C PROBE&RVRS TRNSC: CPT | Performed by: NURSE PRACTITIONER

## 2018-10-29 PROCEDURE — 36415 COLL VENOUS BLD VENIPUNCTURE: CPT | Performed by: NURSE PRACTITIONER

## 2018-10-29 PROCEDURE — 82140 ASSAY OF AMMONIA: CPT | Performed by: NURSE PRACTITIONER

## 2018-10-29 RX ORDER — OMEPRAZOLE 20 MG/1
20 CAPSULE, DELAYED RELEASE ORAL
COMMUNITY
End: 2018-10-29

## 2018-10-29 NOTE — PROGRESS NOTES
70 Nolberto Maloney MD, [address], [place], [place]       Delores Fraga, NP    DAISY Castañeda, [facility]   MEJIA Pandya NP [address] [facility] [address]    at [address], [facility] Radha Guan  22.    [phone]    FAX: [address] [place] [address]    at [address], [address], [address]    [phone]    FAX: [phone]       Patient Care Team:  Adriana Kovacs MD as PCP - General (Internal Medicine)  Marina Barreto MD as Physician (Dermatology)      Problem List  Date Reviewed: 10/29/2018          Codes Class Noted    Cellulitis ICD-10-CM: L03.90  ICD-9-CM: 682.9  7/24/2018        Portal hypertensive gastropathy ([facility] [address] ICD-10-CM: K76.6, K31.89  ICD-9-CM: 572.3, 537.89  12/22/2013        Cirrhosis ([facility] [address] ICD-10-CM: K74.60  ICD-9-CM: 571.5  9/5/2011        Upper GI bleed secondary to NSAID induced gastritis - 9/2010 ICD-10-CM: K92.2  ICD-9-CM: 578.9  9/5/2011        S/P lumbar laminectomy with spinal fusion - 1992 ICD-10-CM: Z98.1  ICD-9-CM: V45.4  9/5/2011        Colon polyps ICD-10-CM: K63.5  ICD-9-CM: 211.3  9/5/2011    Overview Signed 9/5/2011 10:38 AM by Delroy Montaño MD     Last colonoscopy 9/2010             Esophageal varices ([facility] [address] ICD-10-CM: I85.00  ICD-9-CM: 456.1  9/5/2011        Thrombocytopenia secondary to cirrhosis ICD-10-CM: D69.6  ICD-9-CM: 287.5  9/5/2011        Chronic hepatitis C genotype 1 ICD-10-CM: B18.2  ICD-9-CM: 070.54  Unknown              Meme Bunn returns to the [place] [address] for 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 The HCV has been treated and cured.   The active problem list, all pertinent past medical history, medications, liver histology, endoscopic studies, radiologic findings and laboratory findings related to the liver disorder were reviewed with the patient. The patient has not been seen here since 03/2017. The patient reports that he was recently hospitalized at [facility] for a \"leg infection\". He was treated with IV antibiotics for cellulitis. The patient has esophageal varices without bleeding. His previous UGI bleed was from portal gastropathy. Last EGD was performed in 10/2018. Mr. Karma Posadas is on step 2 diuretics. His peripheral edema is well controlled. He is currently taking lactulose and tolerating this without complaint. Mr. Karma Posadas participated in the Astral B clinical trial.  Unfortunately he relapsed 12 weeks post treatment. Mr. Karma Posadas completed twenty-four weeks of treatment with Kenji Magic in 2015. The HCV has been treated and cured. The patient completes all daily activities without any functional limitations. The patient has not experienced fatigue, fevers, chills, shortness of breath, chest pain, pain in the right side over the liver, diffuse abdominal pain, nausea, vomiting, constipation, diarrhrea, dry eyes, dry mouth, arthralgias, myalgias, yellowing of the eyes or skin, itching, dark urine, problems concentrating, swelling of the abdomen, swelling of the lower extremities, hematemesis, or hematochezia. ALLERGIES:  Allergies   Allergen Reactions    Aspirin Other (comments)     Bleeding      Valium [Diazepam] Swelling     Current Outpatient Medications   Medication Sig    trazodone HCl (TRAZODONE, BULK,) 50 mg by Does Not Apply route.  furosemide (LASIX) 80 mg tablet Take 1 Tab by mouth daily.  spironolactone (ALDACTONE) 100 mg tablet Take 2 Tabs by mouth daily.  lactulose (CHRONULAC) 10 gram/15 mL solution Take 20 g by mouth three (3) times daily. Indications: Hepatic Encephalopathy     No current facility-administered medications for this visit.       SYSTEM REVIEW NOT RELATED TO LIVER DISEASE OR REVIEWED ABOVE:  Constitution systems: Negative for fever, chills, weight loss. Eyes: Negative for visual changes. ENT: Negative for sore throat, painful swallowing. Respiratory: Negative for cough, hemoptysis, SOB. Cardiology: Negative for chest pain, palpitations. GI:  Negative for constipation or diarrhea. : Negative for urinary frequency, dysuria, hematuria, nocturia. Skin: Negative for rash. Hematology: Negative for easy bruising, blood clots. Musculo-skelatal: Negative for back pain, muscle pain, weakness. Neurologic: Negative for headaches, dizziness, vertigo, memory problems not related to HE. Psychology: Negative for anxiety, depression. FAMILY/SOCIAL HISTORY:  The patient is . There are 3 children. The patient smokes tobacco. The patient has been abstinent from alcohol since 2007. The patient is currently on disability. PHYSICAL EXAMINATION:    Visit Vitals  /69 (BP 1 Location: Left arm, BP Patient Position: Sitting)   Pulse 82   Temp 97.1 °F (36.2 °C) (Tympanic)   Resp 20   Ht 5' 11\" (1.803 m)   Wt 234 lb 9.6 oz (106.4 kg)   SpO2 97%   BMI 32.72 kg/m²       General: No acute distress. Eyes: Sclera anicteric. ENT: No oral lesions, thyroid normal.  Nodes: No adenopathy. Skin: Spider angiomata on chest, palmar erythema. Respiratory: Lungs clear to auscultation. Cardiovascular: Regular heart rate, no murmurs, no JVD. Abdomen: Soft non-tender, liver enlarged. Spleen not palpable. No obvious ascites. Umbilical hernia present. Extremities: Trace bilateral pedal edema. No muscle wasting. No gross arthritic changes. Neurologic: Alert and oriented, cranial nerves grossly intact, no asterixsis.     LABORATORY STUDIES:  [facility] of [address] [place] [address] & Units 9/5/2018 7/24/2018   WBC 4.6 - 13.2 K/uL 5.9 9.4   ANC 1.8 - 8.0 K/UL 4.4 7.0   HGB 13.0 - 16.0 g/dL 14.4 14.6    - 420 K/uL 52 (L) 102 (L)   INR 0.8 - 1. 2   1.3 (H) 1.3 (H)   AST 15 - 37 U/L 77 (H) 134 (H)   ALT 16 - 61 U/L 50 100 (H)   Alk Phos 45 - 117 U/L 105 154 (H)   Bili, Total 0.2 - 1.0 MG/DL 3.1 (H) 1.7 (H)   Bili, Direct 0.0 - 0.2 MG/DL 1.0 (H) 0.6 (H)   Albumin 3.4 - 5.0 g/dL 3.1 (L) 3.2 (L)   BUN 7.0 - 18 MG/DL 10 18   Creat 0.6 - 1.3 MG/DL 1.04 1.22   Na 136 - 145 mmol/L 135 (L) 128 (L)   K 3.5 - 5.5 mmol/L 3.7 4.7   Cl 100 - 108 mmol/L 101 95 (L)   CO2 21 - 32 mmol/L 28 27   Glucose 74 - 99 mg/dL 116 (H) 75   Ammonia 11 - 32 UMOL/L 127 (H) 55 (H)     Cancer Screening Latest Ref Rng & Units 9/5/2018 7/24/2018   AFP, Serum 0.0 - 8.0 ng/mL 9.5 (H) 8.1 (H)   AFP-L3% 0.0 - 9.9 % 6.1 7.0     SEROLOGIES:  12/10:  HAV total positive, HB surface antigen negative, anti-HB surface negative, HCV genotype 1, HCV RNA log 5.5,    7/2011: Ferritin 73, iron saturation 57%. LIVER HISTOLOGY:  07/2018. TRANSIENT HEPATIC ELASTOGRAPHY:   E Range: 9.44-22.22 kPa  E Mean: 14.32 kPa  E Median: 14.68 kPa  E Std: 3.91 kPa    ENDOSCOPIC PROCEDURES:  9/2010:  Colonoscopy while hospitalized in [facility] - reported to demonstrate colonic polyps. 9/2010:  EGD while hospitalized in [facility] - reported to demonstrate severe gastritis from NSAIDs with small esophageal varices. 2/2011: EGD by [facility]. Esophageal varicies. Banding performed. 11/2011:  EGD by [facility]. Severe portal gastropathy. No varices. 4/2012:  EGD by [facility]:  Grade 2 esophageal varices. Banding performed. Severe hypertensive portal gastropathy. 8/2012:  EGD per [facility]. Small esophageal varices. Severe portal hypertensive gastropathy, gastritis of the antrum, no gastric varices. Repeat in 6 months. 10/2013. EGD by Dr Latha Regalado. Small varices with stigmata of bleeding. Portal gastropathy. 07/2014:  EGD per [facility]. 2 medium esophageal varices. Banding performed. Moderate portal hypertensive gastropathy. Repeat in one year. 9/1/2015: EGD per [facility]. Duodenal ulcer present. H. Pylori negative.   Portal hypertensive gastropathy. Esophageal varices present. Four bands placed. Repeat in 6 months. 11/2015:  EGD per Dr. Hema Malcolm. Moderate portal gastropathy. Grade 1 esophageal varices. No gastric varices noted. 11/2015:  Colonoscopy per Dr. Hema Malcolm. Mild diverticulosis. 11/2016:  EGD per Dr. Gita Lopez. Small esophageal varices. Severe hypertensive portal gastropathy. Repeat in 6 months. 01/2018. EGD by Dr. Jose Alberto Pruett. No report available. 10/2018. EGD by Dr. Real Sprague. Normal esophagus. Pathology negative. Repeat in 2 years. RADIOLOGY:  11/2010: Ultrasound of the liver. Echogenic consistent with cirrhosis. No liver mass lesions. No ascites. 2/2011: Ultrasound of the liver. Changes consistent with cirrhosis. No liver mass lesions. No ascites. No portal vein thrombosis. 11/2011: Ultrasound of the liver. Echogenic consistent with chronic liver disease, cirrhosis. No liver mass lesions. No ascites. 04/12:  CT of abdomen and pelvis - hepatic cirrhosis with portal hypertension with scattered upper abdominal collateralzation and varices. Likely recanalization of the umbilical artery. No mass noted. 1/2015:  Ultrasound of liver. Echogenic consistent with cirrhosis. No liver mass lesions. No dilated bile ducts. No ascites. 8/2015:  CT of the abdomen and pelvis. Coarsened, micronodular surface of the liver with widening of the interlobar fissure. No intrahepatic biliary ductal dilation. Stable enlargement of the portal vein with recanalization of the umbilical vein. Paraesophageal varices. Grossly stable splenomegaly  1/2016:  Ultrasound of liver. Echogenic consistent with cirrhosis. No liver mass lesions. No dilated bile ducts. No ascites. 11/2016:  Ultrasound of the liver. Echogenic consistent with cirrhosis. No liver mass lesions. No dilated bile ducts. No ascites. 05/2018. Abdominal ultrasound. Coarsened hepatic echogenicity consistent with hepatic parenchymal disease.  No focal lesions or biliary dilatation demonstrated. 07/2018. Ultrasound of the liver. Cirrhotic hepatic morphology without focal hepatic lesion. 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 The HCV has been treated and cured. The most recent laboratory studies indicate that the AST is elevated, the ALT is normal, alkaline phosphatase is normal, tests of hepatic synthetic and metabolic function are depressed, and the platelet count is depressed. Will perform laboratory testing to monitor liver function and degree of liver injury. This will include hepatic panel, a CBC w/ diff, a BMP, an HCV RNA, a PT/INR, and an AFP-L3%. Complications of cirrhosis were discussed in detail. We discussed thrombocytopenia, portal hypertension, varices, GI bleeding, peripheral edema, ascites, hepatic encephalopathy, and hepatocellular carcinoma. We discussed the need for follow ups on a regular basis, at 3 month intervals to monitor for complications. We discussed the need for every 6 month liver imaging studies. HCV treatment. Mr. Liborio Apley completed twenty-four weeks of treatment with Tempie Mandril with good toleration in 09/2016. He remained HCV RNA undetectable one year post treatment. Upper GI bleed appears to have been due to portal gastropathy. Repeat EGD was performed in 10/2018. Normal esophagus. Some gastritis of the atrium. Pathology is negative. Repeat EGD in 10/2020. Hepatic encephalopathy. I have ordered an ammonia level today. The patient was instructed not to operate a motor vehicle because of HE which poses an increased risk for MVA. Ammonia level elevated labs on from 09/2018 was 157. Lower extremity. The patient is currently on step 2 diuretics. Continue this dose. Edema well controlled. BUN/CRT are WNL. I have spoken to him about the importance of salt restrictions. The patient was counseled regarding alcohol consumption.     Vaccination for viral hepatitis B is recommended since the patient has no serologic evidence of previous exposure or vaccination with immunity. Vaccination for viral hepatitis A is not required. The patient has serologic evidence of prior exposure or vaccination with immunity. [facility] [address] screening will be performed. AFP ordered today. Abdominal ultrasound ordered. This will be performed with shear wave elastography. Elastography  can assess liver fibrosis and determine if a patient has advanced fibrosis or cirrhosis without the need for liver biopsy. Thrombocytopenia secondary to cirrhosis from chronic HCV. No treatment necessary. [facility] 87 in 3 months. All of the above issues were discussed with the patient. All questions were answered. The patient expressed a clear understanding of the above.     Linsey Horn, JOSEFA-C  [facility] of [address], [address] [place] [address] Elyse Cartagena, [address] [place] [address]   [phone]

## 2018-10-30 LAB
AFP L3 MFR SERPL: 7 % (ref 0–9.9)
AFP SERPL-MCNC: 9 NG/ML (ref 0–8)

## 2018-11-26 ENCOUNTER — TELEPHONE (OUTPATIENT)
Dept: HEMATOLOGY | Age: 63
End: 2018-11-26

## 2018-11-26 NOTE — TELEPHONE ENCOUNTER
Patient called to inform NP Kenny Soha that for the last 2 weeks he has been experiencing on and off sharp abdominal pain (right lower quadrant) pain normally. Patient denies any trauma, redness, and/or bruising. Informed patient that I will send a message to the provider. Patient verbalized understanding.

## 2018-12-17 ENCOUNTER — HOSPITAL ENCOUNTER (OUTPATIENT)
Dept: ULTRASOUND IMAGING | Age: 63
Discharge: HOME | End: 2018-12-17
Payer: MEDICARE

## 2018-12-17 DIAGNOSIS — K74.60 CIRRHOSIS OF LIVER WITHOUT ASCITES, UNSPECIFIED HEPATIC CIRRHOSIS TYPE (HCC): ICD-10-CM

## 2018-12-17 PROCEDURE — 76705 ECHO EXAM OF ABDOMEN: CPT

## 2019-01-02 NOTE — PROGRESS NOTES
Please let him know that his ultrasound is unremarkable. No hepatic masses. Essentially unchanged from previous. Thank you.

## 2019-01-30 ENCOUNTER — APPOINTMENT (OUTPATIENT)
Dept: CT IMAGING | Age: 64
DRG: 066 | End: 2019-01-30
Attending: EMERGENCY MEDICINE
Payer: MEDICARE

## 2019-01-30 ENCOUNTER — OFFICE VISIT (OUTPATIENT)
Dept: HEMATOLOGY | Age: 64
End: 2019-01-30

## 2019-01-30 ENCOUNTER — HOSPITAL ENCOUNTER (INPATIENT)
Age: 64
LOS: 4 days | Discharge: HOME HEALTH | DRG: 066 | End: 2019-02-03
Attending: EMERGENCY MEDICINE | Admitting: HOSPITALIST
Payer: MEDICARE

## 2019-01-30 ENCOUNTER — APPOINTMENT (OUTPATIENT)
Dept: MRI IMAGING | Age: 64
DRG: 066 | End: 2019-01-30
Attending: EMERGENCY MEDICINE
Payer: MEDICARE

## 2019-01-30 VITALS
OXYGEN SATURATION: 97 % | BODY MASS INDEX: 31.64 KG/M2 | SYSTOLIC BLOOD PRESSURE: 113 MMHG | HEART RATE: 83 BPM | DIASTOLIC BLOOD PRESSURE: 74 MMHG | HEIGHT: 71 IN | RESPIRATION RATE: 18 BRPM | TEMPERATURE: 98.4 F | WEIGHT: 226 LBS

## 2019-01-30 DIAGNOSIS — D68.9 COAGULOPATHY (HCC): ICD-10-CM

## 2019-01-30 DIAGNOSIS — Z98.1 S/P LAMINECTOMY WITH SPINAL FUSION: ICD-10-CM

## 2019-01-30 DIAGNOSIS — K72.10 CHRONIC LIVER FAILURE WITHOUT HEPATIC COMA (HCC): ICD-10-CM

## 2019-01-30 DIAGNOSIS — K74.60 CIRRHOSIS OF LIVER WITHOUT ASCITES, UNSPECIFIED HEPATIC CIRRHOSIS TYPE (HCC): Primary | ICD-10-CM

## 2019-01-30 DIAGNOSIS — I63.312 CEREBROVASCULAR ACCIDENT (CVA) DUE TO THROMBOSIS OF LEFT MIDDLE CEREBRAL ARTERY (HCC): Primary | ICD-10-CM

## 2019-01-30 PROBLEM — M54.9 BACK PAIN: Status: ACTIVE | Noted: 2019-01-30

## 2019-01-30 PROBLEM — R07.89 CHEST PRESSURE: Status: ACTIVE | Noted: 2019-01-30

## 2019-01-30 PROBLEM — R79.1 ELEVATED INR: Status: ACTIVE | Noted: 2019-01-30

## 2019-01-30 PROBLEM — I63.9 CVA (CEREBRAL VASCULAR ACCIDENT) (HCC): Status: ACTIVE | Noted: 2019-01-30

## 2019-01-30 LAB
ALBUMIN SERPL-MCNC: 3.2 G/DL (ref 3.4–5)
ALBUMIN/GLOB SERPL: 0.8 {RATIO} (ref 0.8–1.7)
ALP SERPL-CCNC: 81 U/L (ref 45–117)
ALT SERPL-CCNC: 33 U/L (ref 16–61)
AMMONIA PLAS-SCNC: 48 UMOL/L (ref 11–32)
ANION GAP SERPL CALC-SCNC: 8 MMOL/L (ref 3–18)
APPEARANCE UR: CLEAR
AST SERPL-CCNC: 48 U/L (ref 15–37)
BASOPHILS # BLD: 0 K/UL (ref 0–0.1)
BASOPHILS NFR BLD: 0 % (ref 0–2)
BILIRUB SERPL-MCNC: 3 MG/DL (ref 0.2–1)
BILIRUB UR QL: NEGATIVE
BUN SERPL-MCNC: 10 MG/DL (ref 7–18)
BUN/CREAT SERPL: 11 (ref 12–20)
CALCIUM SERPL-MCNC: 8.3 MG/DL (ref 8.5–10.1)
CHLORIDE SERPL-SCNC: 102 MMOL/L (ref 100–108)
CK MB CFR SERPL CALC: 1 % (ref 0–4)
CK MB CFR SERPL CALC: 1.3 % (ref 0–4)
CK MB SERPL-MCNC: 1 NG/ML (ref 5–25)
CK MB SERPL-MCNC: 1.4 NG/ML (ref 5–25)
CK SERPL-CCNC: 100 U/L (ref 39–308)
CK SERPL-CCNC: 106 U/L (ref 39–308)
CO2 SERPL-SCNC: 26 MMOL/L (ref 21–32)
COLOR UR: YELLOW
CREAT SERPL-MCNC: 0.87 MG/DL (ref 0.6–1.3)
DIFFERENTIAL METHOD BLD: ABNORMAL
EOSINOPHIL # BLD: 0.1 K/UL (ref 0–0.4)
EOSINOPHIL NFR BLD: 1 % (ref 0–5)
ERYTHROCYTE [DISTWIDTH] IN BLOOD BY AUTOMATED COUNT: 15 % (ref 11.6–14.5)
GLOBULIN SER CALC-MCNC: 3.9 G/DL (ref 2–4)
GLUCOSE SERPL-MCNC: 113 MG/DL (ref 74–99)
GLUCOSE UR STRIP.AUTO-MCNC: NEGATIVE MG/DL
HCT VFR BLD AUTO: 39.1 % (ref 36–48)
HGB BLD-MCNC: 13.9 G/DL (ref 13–16)
HGB UR QL STRIP: NEGATIVE
INR PPP: 1.5 (ref 0.8–1.2)
KETONES UR QL STRIP.AUTO: NEGATIVE MG/DL
LEUKOCYTE ESTERASE UR QL STRIP.AUTO: NEGATIVE
LYMPHOCYTES # BLD: 0.3 K/UL (ref 0.9–3.6)
LYMPHOCYTES NFR BLD: 5 % (ref 21–52)
MCH RBC QN AUTO: 34.5 PG (ref 24–34)
MCHC RBC AUTO-ENTMCNC: 35.5 G/DL (ref 31–37)
MCV RBC AUTO: 97 FL (ref 74–97)
MONOCYTES # BLD: 0.4 K/UL (ref 0.05–1.2)
MONOCYTES NFR BLD: 6 % (ref 3–10)
NEUTS SEG # BLD: 5.1 K/UL (ref 1.8–8)
NEUTS SEG NFR BLD: 88 % (ref 40–73)
NITRITE UR QL STRIP.AUTO: NEGATIVE
PH UR STRIP: 7.5 [PH] (ref 5–8)
PLATELET # BLD AUTO: 57 K/UL (ref 135–420)
PLATELET COMMENTS,PCOM: ABNORMAL
PMV BLD AUTO: 9.6 FL (ref 9.2–11.8)
POTASSIUM SERPL-SCNC: 3.8 MMOL/L (ref 3.5–5.5)
PROT SERPL-MCNC: 7.1 G/DL (ref 6.4–8.2)
PROT UR STRIP-MCNC: NEGATIVE MG/DL
PROTHROMBIN TIME: 17.5 SEC (ref 11.5–15.2)
RBC # BLD AUTO: 4.03 M/UL (ref 4.7–5.5)
RBC MORPH BLD: ABNORMAL
SODIUM SERPL-SCNC: 136 MMOL/L (ref 136–145)
SP GR UR REFRACTOMETRY: 1.01 (ref 1–1.03)
TROPONIN I SERPL-MCNC: <0.02 NG/ML (ref 0–0.04)
TROPONIN I SERPL-MCNC: <0.02 NG/ML (ref 0–0.04)
UROBILINOGEN UR QL STRIP.AUTO: 0.2 EU/DL (ref 0.2–1)
WBC # BLD AUTO: 5.9 K/UL (ref 4.6–13.2)

## 2019-01-30 PROCEDURE — 82550 ASSAY OF CK (CPK): CPT

## 2019-01-30 PROCEDURE — 80053 COMPREHEN METABOLIC PANEL: CPT

## 2019-01-30 PROCEDURE — 97161 PT EVAL LOW COMPLEX 20 MIN: CPT

## 2019-01-30 PROCEDURE — 74011250637 HC RX REV CODE- 250/637: Performed by: HOSPITALIST

## 2019-01-30 PROCEDURE — 77030032490 HC SLV COMPR SCD KNE COVD -B

## 2019-01-30 PROCEDURE — 65660000000 HC RM CCU STEPDOWN

## 2019-01-30 PROCEDURE — 93005 ELECTROCARDIOGRAM TRACING: CPT

## 2019-01-30 PROCEDURE — 82140 ASSAY OF AMMONIA: CPT

## 2019-01-30 PROCEDURE — 74011250636 HC RX REV CODE- 250/636: Performed by: HOSPITALIST

## 2019-01-30 PROCEDURE — 74011250637 HC RX REV CODE- 250/637: Performed by: EMERGENCY MEDICINE

## 2019-01-30 PROCEDURE — 70551 MRI BRAIN STEM W/O DYE: CPT

## 2019-01-30 PROCEDURE — 85610 PROTHROMBIN TIME: CPT

## 2019-01-30 PROCEDURE — 81003 URINALYSIS AUTO W/O SCOPE: CPT

## 2019-01-30 PROCEDURE — 99285 EMERGENCY DEPT VISIT HI MDM: CPT

## 2019-01-30 PROCEDURE — 85025 COMPLETE CBC W/AUTO DIFF WBC: CPT

## 2019-01-30 PROCEDURE — 74011250637 HC RX REV CODE- 250/637: Performed by: INTERNAL MEDICINE

## 2019-01-30 PROCEDURE — 70450 CT HEAD/BRAIN W/O DYE: CPT

## 2019-01-30 PROCEDURE — 82107 ALPHA-FETOPROTEIN L3: CPT

## 2019-01-30 PROCEDURE — 36415 COLL VENOUS BLD VENIPUNCTURE: CPT

## 2019-01-30 PROCEDURE — 97165 OT EVAL LOW COMPLEX 30 MIN: CPT

## 2019-01-30 RX ORDER — IBUPROFEN 200 MG
1 TABLET ORAL DAILY
Status: DISCONTINUED | OUTPATIENT
Start: 2019-01-31 | End: 2019-01-30

## 2019-01-30 RX ORDER — IBUPROFEN 200 MG
1 TABLET ORAL DAILY
Status: DISCONTINUED | OUTPATIENT
Start: 2019-01-30 | End: 2019-01-31

## 2019-01-30 RX ORDER — SODIUM CHLORIDE 0.9 % (FLUSH) 0.9 %
5-40 SYRINGE (ML) INJECTION AS NEEDED
Status: DISCONTINUED | OUTPATIENT
Start: 2019-01-30 | End: 2019-02-03 | Stop reason: HOSPADM

## 2019-01-30 RX ORDER — SODIUM CHLORIDE 9 MG/ML
100 INJECTION, SOLUTION INTRAVENOUS CONTINUOUS
Status: DISCONTINUED | OUTPATIENT
Start: 2019-01-30 | End: 2019-02-03

## 2019-01-30 RX ORDER — ACETAMINOPHEN 325 MG/1
650 TABLET ORAL ONCE
Status: COMPLETED | OUTPATIENT
Start: 2019-01-30 | End: 2019-01-30

## 2019-01-30 RX ORDER — ATORVASTATIN CALCIUM 20 MG/1
20 TABLET, FILM COATED ORAL DAILY
Status: DISCONTINUED | OUTPATIENT
Start: 2019-01-31 | End: 2019-02-01

## 2019-01-30 RX ORDER — SODIUM CHLORIDE 0.9 % (FLUSH) 0.9 %
5-40 SYRINGE (ML) INJECTION EVERY 8 HOURS
Status: DISCONTINUED | OUTPATIENT
Start: 2019-01-30 | End: 2019-02-03 | Stop reason: HOSPADM

## 2019-01-30 RX ORDER — ACETAMINOPHEN 325 MG/1
650 TABLET ORAL
Status: DISCONTINUED | OUTPATIENT
Start: 2019-01-30 | End: 2019-02-03 | Stop reason: HOSPADM

## 2019-01-30 RX ORDER — OXYCODONE AND ACETAMINOPHEN 5; 325 MG/1; MG/1
1 TABLET ORAL
Status: DISCONTINUED | OUTPATIENT
Start: 2019-01-30 | End: 2019-02-03 | Stop reason: HOSPADM

## 2019-01-30 RX ADMIN — ACETAMINOPHEN 650 MG: 325 TABLET ORAL at 22:39

## 2019-01-30 RX ADMIN — Medication 10 ML: at 18:55

## 2019-01-30 RX ADMIN — SODIUM CHLORIDE 100 ML/HR: 900 INJECTION, SOLUTION INTRAVENOUS at 18:54

## 2019-01-30 RX ADMIN — ACETAMINOPHEN 650 MG: 325 TABLET ORAL at 09:45

## 2019-01-30 RX ADMIN — OXYCODONE AND ACETAMINOPHEN 1 TABLET: 5; 325 TABLET ORAL at 18:54

## 2019-01-30 NOTE — ED PROVIDER NOTES
EMERGENCY DEPARTMENT HISTORY AND PHYSICAL EXAM 
 
Date: 1/30/2019 Patient Name: Shu Kramer History of Presenting Illness Chief Complaint Patient presents with  Blurred Vision History Provided By: Patient Chief Complaint: blurry vision Duration: \"over a week ago\" lasting for 8-15 minutes an episode Timing:  Intermittent Location: eye/neuro Quality: described as double vision Modifying Factors: Pt notes some alleviation with closing his eyes and being still. Associated Symptoms: gait disturbance, tinnitus, dizziness, intermittent HA with episodes of blurred vision, tremors, and tingling sensation in tongue. Additional History (Context):  
9:07 AM 
Shu Kramer is a 59 y.o. male with PMHX of Hepatitis C, Cirrhosis, skin cancer, and esophageal varicies who presents to the emergency department from Dr. Otis Anderson office (Hepatology) C/O intermittent (5-6 episodes a day) blurry vision described as double vision onset over a week ago lasting 8-15 minutes an episodes. Pt states that there is no pattern he can identify with regards to when episodes occur. He states episodes may occur while he is at rest. Pt notes some alleviation with closing his eyes and being still. Associated sxs include gait disturbance, tinnitus, dizziness, intermittent HA with episodes of blurred vision, tremors, and tingling sensation in tongue. Pt has not been driving. Allergy to ASA and Valium reported. PSHx of EGD, hernia repair, and tonsillectomy. Pt denies abd pain any other sxs or complaints. PCP: Yifan Reyes MD 
 
Current Facility-Administered Medications Medication Dose Route Frequency Provider Last Rate Last Dose  sodium chloride (NS) flush 5-40 mL  5-40 mL IntraVENous Q8H Harley Boast, MD      
 sodium chloride (NS) flush 5-40 mL  5-40 mL IntraVENous PRN Harley Boast, MD      
 0.9% sodium chloride infusion  100 mL/hr IntraVENous CONTINUOUS Harley Boast, MD      
  oxyCODONE-acetaminophen (PERCOCET) 5-325 mg per tablet 1 Tab  1 Tab Oral Q8H PRN Odilia Huynh MD      
 [START ON 1/31/2019] atorvastatin (LIPITOR) tablet 20 mg  20 mg Oral DAILY Odilia Huynh MD      
 
 
Past History Past Medical History: 
Past Medical History:  
Diagnosis Date  Cancer ([facility] [address] 11/2011  
 basal cell carcinoma/L cheek  Cirrhosis ([facility] [address] Secondary to chronic HCV  Contact dermatitis and other eczema, due to unspecified cause  Esophageal varices in cirrhosis ([facility] [address]  GERD (gastroesophageal reflux disease) H/O in the past  
 Hep C w/ coma, chronic ([facility] [address] cured from Hep C   
 Hepatitis C Cured in 2-2016  Ill-defined condition  Liver disease  Other ill-defined conditions(799.89)   
 hands and bottom of feet going numb lately  [place] [address] [place]  Upper GI bleed Secondary to NSAID induced gastritis, 09/10. Past Surgical History: 
Past Surgical History:  
Procedure Laterality Date  EGD  09/10 EGD while hospitalized in [place]-reported to demonstrate severe gastritis from [facility] with small esophageal varices.  ENDOSCOPY, COLON, DIAGNOSTIC  09/10 Colonoscopy while in hospitalized in [facility]- reported to demonstrate colonic polyps  HX CATARACT REMOVAL  2018  HX HERNIA REPAIR    
 umbilical  
 HX LUMBAR LAMINECTOMY  1992  HX MALIGNANT SKIN LESION EXCISION  11/1/2011  
 standard excision to L cheek d/t basal cell carcinoma  HX TONSILLECTOMY Family History: 
Family History Problem Relation Age of Onset  Migraines Brother  Malignant Hyperthermia Neg Hx  Pseudocholinesterase Deficiency Neg Hx  Delayed Awakening Neg Hx  Post-op Nausea/Vomiting Neg Hx  Emergence Delirium Neg Hx  Post-op Cognitive Dysfunction Neg Hx   
 Other Neg Hx Social History: 
Social History Tobacco Use  Smoking status: Light Tobacco Smoker Packs/day: 0.20 Years: 44.00 Pack years: 8.80 Last attempt to quit: 2017 Years since quittin.4  Smokeless tobacco: Never Used Substance Use Topics  Alcohol use: No  
  Alcohol/week: 0.0 oz  Drug use: No  
 
 
Allergies: Allergies Allergen Reactions  Aspirin Other (comments) Bleeding  Valium [Diazepam] Swelling Review of Systems Review of Systems Constitutional: Negative for chills and fever. HENT: Positive for tinnitus. Negative for congestion and sore throat. Eyes: Positive for visual disturbance. Respiratory: Negative for cough and shortness of breath. Cardiovascular: Negative for chest pain and palpitations. Gastrointestinal: Negative for abdominal pain, nausea and vomiting. Genitourinary: Negative for dysuria, flank pain and frequency. Musculoskeletal: Positive for gait problem. Negative for arthralgias, joint swelling and myalgias. Skin: Negative for color change and wound. Neurological: Positive for dizziness and tremors. Negative for weakness, light-headedness and headaches. (+) Tingling sensation on tongue Hematological: Negative for adenopathy. All other systems reviewed and are negative. Physical Exam  
 
Vitals:  
 19 1430 19 1530 19 1600 19 1650 BP: 105/65 100/61 101/62 117/63 Pulse: 74 75 75 83 Resp: 11 18 16 16 Temp:    97.9 °F (36.6 °C) SpO2: 100% 97% 96% 100% Weight:      
Height:      
 
Physical Exam  
Constitutional: He is oriented to person, place, and time. He appears well-developed and well-nourished. HENT:  
Head: Normocephalic and atraumatic. Right Ear: External ear normal.  
Left Ear: External ear normal.  
Nose: Nose normal.  
Oropharynx is dry. TM's clear bilaterally. Some movements noted on face. Eyes: Conjunctivae and EOM are normal. Pupils are equal, round, and reactive to light. Neck: Normal range of motion. Neck supple. No JVD present. No tracheal deviation present. No thyromegaly present. Cardiovascular: Normal rate, regular rhythm, normal heart sounds and intact distal pulses. Exam reveals no gallop and no friction rub. No murmur heard. Pulmonary/Chest: Effort normal and breath sounds normal. No respiratory distress. He has no wheezes. He has no rales. Abdominal: Soft. Bowel sounds are normal. He exhibits distension. He exhibits no mass. There is no tenderness. There is no rebound and no guarding. Slightly distended and nontender abdomen. Musculoskeletal: Normal range of motion. He exhibits no tenderness or deformity. Neurological: He is alert and oriented to person, place, and time. He has normal reflexes. No cranial nerve deficit. He exhibits normal muscle tone. Pt has a tremor noted. Skin: Skin is warm and dry. No rash noted. With diffuse telangiectasias Psychiatric: He has a normal mood and affect. His behavior is normal.  
Nursing note and vitals reviewed. Diagnostic Study Results Labs - Recent Results (from the past 12 hour(s)) URINALYSIS W/ RFLX MICROSCOPIC Collection Time: 01/30/19  9:15 AM  
Result Value Ref Range Color YELLOW Appearance CLEAR Specific gravity 1.010 1.005 - 1.030    
 pH (UA) 7.5 5.0 - 8.0 Protein NEGATIVE  NEG mg/dL Glucose NEGATIVE  NEG mg/dL Ketone NEGATIVE  NEG mg/dL Bilirubin NEGATIVE  NEG Blood NEGATIVE  NEG Urobilinogen 0.2 0.2 - 1.0 EU/dL Nitrites NEGATIVE  NEG Leukocyte Esterase NEGATIVE  NEG    
EKG, 12 LEAD, INITIAL Collection Time: 01/30/19  9:31 AM  
Result Value Ref Range Ventricular Rate 88 BPM  
 Atrial Rate 88 BPM  
 P-R Interval 164 ms QRS Duration 84 ms Q-T Interval 386 ms QTC Calculation (Bezet) 467 ms Calculated P Axis 45 degrees Calculated R Axis 33 degrees Calculated T Axis 39 degrees Diagnosis Normal sinus rhythm Nonspecific T wave abnormality Prolonged QT Abnormal ECG When compared with ECG of 11-NOV-2016 13:04, 
 Nonspecific T wave abnormality now evident in Inferior leads T wave amplitude has decreased in Anterior leads CBC WITH AUTOMATED DIFF Collection Time: 01/30/19  9:55 AM  
Result Value Ref Range WBC 5.9 4.6 - 13.2 K/uL  
 RBC 4.03 (L) 4.70 - 5.50 M/uL  
 HGB 13.9 13.0 - 16.0 g/dL HCT 39.1 36.0 - 48.0 % MCV 97.0 74.0 - 97.0 FL  
 MCH 34.5 (H) 24.0 - 34.0 PG  
 MCHC 35.5 31.0 - 37.0 g/dL  
 RDW 15.0 (H) 11.6 - 14.5 % PLATELET 57 (L) 848 - 420 K/uL MPV 9.6 9.2 - 11.8 FL  
 NEUTROPHILS 88 (H) 40 - 73 % LYMPHOCYTES 5 (L) 21 - 52 % MONOCYTES 6 3 - 10 % EOSINOPHILS 1 0 - 5 % BASOPHILS 0 0 - 2 %  
 ABS. NEUTROPHILS 5.1 1.8 - 8.0 K/UL  
 ABS. LYMPHOCYTES 0.3 (L) 0.9 - 3.6 K/UL  
 ABS. MONOCYTES 0.4 0.05 - 1.2 K/UL  
 ABS. EOSINOPHILS 0.1 0.0 - 0.4 K/UL  
 ABS. BASOPHILS 0.0 0.0 - 0.1 K/UL PLATELET COMMENTS DECREASED PLATELETS    
 RBC COMMENTS ANISOCYTOSIS 1+ 
    
 DF MANUAL METABOLIC PANEL, COMPREHENSIVE Collection Time: 01/30/19  9:55 AM  
Result Value Ref Range Sodium 136 136 - 145 mmol/L Potassium 3.8 3.5 - 5.5 mmol/L Chloride 102 100 - 108 mmol/L  
 CO2 26 21 - 32 mmol/L Anion gap 8 3.0 - 18 mmol/L Glucose 113 (H) 74 - 99 mg/dL BUN 10 7.0 - 18 MG/DL Creatinine 0.87 0.6 - 1.3 MG/DL  
 BUN/Creatinine ratio 11 (L) 12 - 20 GFR est AA >60 >60 ml/min/1.73m2 GFR est non-AA >60 >60 ml/min/1.73m2 Calcium 8.3 (L) 8.5 - 10.1 MG/DL Bilirubin, total 3.0 (H) 0.2 - 1.0 MG/DL  
 ALT (SGPT) 33 16 - 61 U/L  
 AST (SGOT) 48 (H) 15 - 37 U/L Alk. phosphatase 81 45 - 117 U/L Protein, total 7.1 6.4 - 8.2 g/dL Albumin 3.2 (L) 3.4 - 5.0 g/dL Globulin 3.9 2.0 - 4.0 g/dL A-G Ratio 0.8 0.8 - 1.7 PROTHROMBIN TIME + INR Collection Time: 01/30/19  9:55 AM  
Result Value Ref Range Prothrombin time 17.5 (H) 11.5 - 15.2 sec INR 1.5 (H) 0.8 - 1.2 AMMONIA Collection Time: 01/30/19  9:55 AM  
Result Value Ref Range Ammonia 48 (H) 11 - 32 UMOL/L  
CARDIAC PANEL,(CK, CKMB & TROPONIN) Collection Time: 01/30/19  9:55 AM  
Result Value Ref Range  39 - 308 U/L  
 CK - MB 1.0 <3.6 ng/ml CK-MB Index 1.0 0.0 - 4.0 % Troponin-I, QT <0.02 0.0 - 0.045 NG/ML Radiologic Studies -  
MRI BRAIN WO CONT Final Result Addendum 1 of 1 Addendum: This addendum is being made in order to correct a voice to text software  
transcription error. Impression #1 should state \"Probable recent acute/subacute infarct   
involving the  
anterior aspect of the left temporal stem. No evidence of associated   
hemorrhage  
or mass effect. \" I apologize for any confusion. Final  
  
CT HEAD WO CONT Final Result IMPRESSION:  
  
1. Punctate left basal ganglia hypodensity, age indeterminate suggest  
correlation with level and site of clinical symptoms to evaluate significance. 2. Minor white matter findings non-specific, but typically reflecting chronic  
ischemic change in a patient of this age. Please note that head CT may be negative in the acute setting and MRI could best  
further evaluate for acute/subacute ischemia/infarct in the high/persistent  
index of clinical suspicion. As read by the radiologist. 
 
 
CXR Results  (Last 48 hours) None Medications given in the ED- Medications  
sodium chloride (NS) flush 5-40 mL (not administered)  
sodium chloride (NS) flush 5-40 mL (not administered) 0.9% sodium chloride infusion (not administered)  
oxyCODONE-acetaminophen (PERCOCET) 5-325 mg per tablet 1 Tab (not administered)  
atorvastatin (LIPITOR) tablet 20 mg (not administered)  
acetaminophen (TYLENOL) tablet 650 mg (650 mg Oral Given 1/30/19 0945) Medical Decision Making I am the first provider for this patient. I reviewed the vital signs, available nursing notes, past medical history, past surgical history, family history and social history. Vital Signs-Reviewed the patient's vital signs. Pulse Oximetry Analysis - 100% on room air. Cardiac Monitor: 
Rate: 77 bpm 
Rhythm: sinus rhythm EKG interpretation: (Preliminary) 9:07 AM  
NSR at 88 bpm.Nonspecific T wave abnormality. Prolonged QT at 386 ms. QTc at 467 ms. EKG read by Star Soto MD  
 
Records Reviewed: Nursing Notes and Old Medical Records Provider Notes (Medical Decision Making): DDx: Vertigo, asterixis, hepatic encephalopathy asterixis, stroke (doubt this), TIA. Procedures: 
Procedures ED Course:  
9:07 AM Initial assessment performed. The patients presenting problems have been discussed, and they are in agreement with the care plan formulated and outlined with them. I have encouraged them to ask questions as they arise throughout their visit. 9:30 AM Discussed patient's history, exam, and available diagnostics results with Gill Lancaster MD, teleneurology, who agree with seeing pt. 
 
9:55 AM Discussed patient's history, exam, and available diagnostics results with Gill Lancaster MD, teleneuerology, who believes pt has double vision likely due to ammonia levels, but recommends that pt have MRI. 
 
2:18 PM Discussed patient's history, exam, and available diagnostics results with Lizet Mccrary MD, pulmonologist, who recommends consulting Loretta Panda MD. 
 
3:09 PM Discussed patient's history, exam, and available diagnostics results with Sang Foreman MD, neurology, who agree with consulting on pt. Due to elevated INR and thrombocytopenia, does not recommend Plavix. Diagnosis and Disposition Critical Care Time: 
I have spent 32 minutes of critical care time involved in lab review, consultations with specialist, family decision-making, and documentation. During this entire length of time I was immediately available to the patient. Critical Care:   The reason for providing this level of medical care for this critically ill patient was due a critical illness that impaired one or more vital organ systems such that there was a high probability of imminent or life threatening deterioration in the patients condition. This care involved high complexity decision making to assess, manipulate, and support vital system functions, to treat this degreee vital organ system failure and to prevent further life threatening deterioration of the patients condition. Core Measures: 
For Hospitalized Patients: 
 
1. Hospitalization Decision Time: The decision to hospitalize the patient was made by Yadi Gordon MD at 1:40 PM on 1/30/2019 2. Aspirin: Aspirin was not given because the patient is allergic 3:10 PM  Patient is being admitted to the hospital by Sage Platt MD. The results of their tests and reasons for their admission have been discussed with them and/or available family. They convey agreement and understanding for the need to be admitted and for their admission diagnosis. CONDITIONS ON ADMISSION: 
Sepsis is not present at the time of admission. Deep Vein Thrombosis is not present at the time of admission. Thrombosis is not present at the time of admission. Urinary Tract Infection is not present at the time of admission. Pneumonia is not present at the time of admission. MRSA is not present at the time of admission. Wound infection is not present at the time of admission. Pressure Ulcer is not present at the time of admission. CLINICAL IMPRESSION: 
 
1. Cerebrovascular accident (CVA) due to thrombosis of left middle cerebral artery ([address] 2. Chronic liver failure without hepatic coma ([address] 3. Coagulopathy ([address] Discussion:  
Pt. stable in the ED without episodes of diplopia or being off balance. ECG and Troponin showed no evidence of ACS. Labs remarkable for elevated ammonia and elevated PT INR.  Head CT scan was concerning for punctate left basal ganglia hypodensity. MRI brain showed acute vs subacute infarct of the left temporal stem. Case discussed with Isaiah Salazar MD neurology, who did not recommend Plavix of ASA since pt platelet count was 36,894. Pt has allergy to ASA. Pt was admitted. Case discussed with Dr. Sarah Beth Saucedo who admitted pt for CVA evaluation. PLAN: 
1. ADMIT to Telemetry 
_______________________________ Attestations: This note is prepared by Cayetano Weir, acting as Scribe for Jeanine Quezada MD. 
 
Jeanine Quezada MD:  The scribe's documentation has been prepared under my direction and personally reviewed by me in its entirety. I confirm that the note above accurately reflects all work, treatment, procedures, and medical decision making performed by me. 
_______________________________

## 2019-01-30 NOTE — H&P
History & Physical 
Patient: Chapin Bustillo MRN: [id number]  CSN: [id number] YOB: 1955  Age: 59 y.o. Sex: male DOA: 1/30/2019 Primary Care Provider:  Rosita Delgado MD 
 
 
Assessment/Plan Hospital Problems  Date Reviewed: 1/30/2019 Codes Class Noted POA * (Principal) CVA (cerebral vascular accident) ([facility] [address] [facility] ICD-10-CM: I63.9 ICD-9-CM: 434.91  1/30/2019 Unknown Elevated INR ICD-10-CM: R79.1 ICD-9-CM: 790.92  1/30/2019 Unknown Back pain ICD-10-CM: M54.9 ICD-9-CM: 724.5  1/30/2019 Unknown Chest pressure ICD-10-CM: R07.89 ICD-9-CM: 786.59  1/30/2019 Unknown Cirrhosis ([facility] [address] ICD-10-CM: K74.60 ICD-9-CM: 571.5  9/5/2011 Yes Thrombocytopenia secondary to cirrhosis ICD-10-CM: D69.6 ICD-9-CM: 287.5  9/5/2011 Yes Admit to tele Stroke [facility] Neuro called, stroke protocol Will have neuro check, mri brain done -acute/subacute infarct anterior aspect of the left temporal stem Allergic to aspirin  
plavix was hold due to elevated inr 
Continue neuro check  
cta neck/head Echo, cardiac monitoring Ns infusion Elevated inr Due to cirrhosis Cirrhosis F/u with dr. Hannah Hussein, Will hold lasix and spirolactone for 24-48 hr  
Ammonia was 48. Reported bm twice a day Will hold lactulose for now Ammonia level monitor Thrombocytopenia Continue monitor platelet Chest pressure Will have ce trend  
ce x1 wnl, ekg good Continue ce trend Will have echo DVT : scd   ppi proph CC: double vision HPI:  
 
Chapin Bustillo is a 59 y.o. male who hx of cirrhosis, hcv came to ER due to on and off double vision over one week. He started he had blurred vision for a while, but vision became doubled on and off for one week, associated with headache, unbalanced, no slurred speech/weakness. Mri brain indicated acute/subcute infarct. Reported chest pressure, headache and back pain. Visit Vitals /63 Pulse 83 Temp 97.9 °F (36.6 °C) Resp 16 Ht 5' 11\" (1.803 m) Wt 102.5 kg (226 lb) SpO2 100% BMI 31.52 kg/m² O2 Device: Room air Past Medical History:  
Diagnosis Date  Cancer ([facility] 11/2011  
 basal cell carcinoma/L cheek  Cirrhosis ([facility] Secondary to chronic HCV  Contact dermatitis and other eczema, due to unspecified cause  Esophageal varices in [facility] ([facility]  GERD (gastroesophageal reflux disease) H/O in the past  
 Hep C w/ coma, chronic ([facility] cured from Hep C   
 Hepatitis C Cured in 2-2016  Ill-defined condition  Liver disease  Other ill-defined conditions(799.89)   
 hands and bottom of feet going numb lately  Skin cancer [address] left cheek 2011  Upper GI bleed Secondary to NSAID induced gastritis, 09/10. Past Surgical History:  
Procedure Laterality Date  EGD  09/10 EGD while hospitalized in [place]-reported to demonstrate severe gastritis from [facility] with small esophageal varices.  ENDOSCOPY, COLON, DIAGNOSTIC  09/10 Colonoscopy while in hospitalized in [facility]- reported to demonstrate colonic polyps  HX CATARACT REMOVAL  2018  HX HERNIA REPAIR    
 umbilical  
 HX LUMBAR LAMINECTOMY  1992  HX MALIGNANT SKIN LESION EXCISION  11/1/2011  
 standard excision to L cheek d/t basal cell carcinoma  HX TONSILLECTOMY Family History Problem Relation Age of Onset  Migraines Brother  Malignant Hyperthermia Neg Hx  Pseudocholinesterase Deficiency Neg Hx  Delayed Awakening Neg Hx  Post-op Nausea/Vomiting Neg Hx  Emergence Delirium Neg Hx  Post-op Cognitive Dysfunction Neg Hx   
 Other Neg Hx Social History Socioeconomic History  Marital status: SINGLE Spouse name: Not on file  Number of children: Not on file  Years of education: Not on file  Highest education level: Not on file Tobacco Use  Smoking status: Light Tobacco Smoker Packs/day: 0.20 Years: 44.00 Pack years: 8.80 Last attempt to quit: 2017 Years since quittin.4  Smokeless tobacco: Never Used Substance and Sexual Activity  Alcohol use: No  
  Alcohol/week: 0.0 oz  Drug use: No  
 Sexual activity: Yes Prior to Admission medications Medication Sig Start Date End Date Taking? Authorizing Provider  
trazodone HCl (TRAZODONE, BULK,) 50 mg by Does Not Apply route. Provider, Historical  
furosemide (LASIX) 80 mg tablet Take 1 Tab by mouth daily. 18   Carrol Bautista NP  
spironolactone (ALDACTONE) 100 mg tablet Take 2 Tabs by mouth daily. 18   Carrol Bautista NP  
lactulose (CHRONULAC) 10 gram/15 mL solution Take 20 g by mouth three (3) times daily. Indications: Hepatic Encephalopathy    Provider, Historical  
 
 
Allergies Allergen Reactions  Aspirin Other (comments) Bleeding  Valium [Diazepam] Swelling Review of Systems Gen: No fever, chills, malaise, weight loss/gain. Heent: No headache, rhinorrhea, epistaxis, ear pain, hearing loss, sinus pain, neck pain/stiffness, sore throat. Heart: chest pressure , no palpitations, SMYTH, pnd, or orthopnea. Resp: No cough, hemoptysis, wheezing and shortness of breath. GI: No nausea, vomiting, diarrhea, constipation, melena or hematochezia. : No urinary obstruction, dysuria or hematuria. Derm: No rash, new skin lesion or pruritis. Musc/skeletal: back pain Vasc: No edema, cyanosis or claudication. Endo: No heat/cold intolerance, no polyuria,polydipsia or polyphagia. Neuro: No unilateral weakness, +numbness, +tingling. No seizures. Blurred vision and double vision , unbalanced Heme: No easy bruising or bleeding. Physical Exam:  
 
Physical Exam: 
Visit Vitals /63 Pulse 83 Temp 97.9 °F (36.6 °C) Resp 16 Ht 5' 11\" (1.803 m) Wt 102.5 kg (226 lb) SpO2 100% BMI 31.52 kg/m² O2 Device: Room air Temp (24hrs), Av °F (36.7 °C), Min:97.6 °F (36.4 °C), Max:98.4 °F (36.9 °C) No intake/output data recorded. No intake/output data recorded. General:  Awake, cooperative, no distress. Head:  Normocephalic, without obvious abnormality, atraumatic. Eyes:  Conjunctivae/corneas clear, sclera anicteric, PERRL, EOMs intact. Nose: Nares normal. No drainage or sinus tenderness. Throat: Lips, mucosa, and tongue normal. .  
Neck: Supple, symmetrical, trachea midline, no adenopathy. Lungs:   Clear to auscultation bilaterally. Heart:  Regular rate and rhythm, S1, S2 normal, no murmur, click, rub or gallop. Abdomen: Soft, non-tender. Bowel sounds normal. No masses,  No organomegaly. Extremities: Extremities normal, atraumatic, no cyanosis or edema. Pulses: 2+ and symmetric all extremities. Skin: Skin color-pink, texture, turgor normal. No rashes or lesions. Capillary refill normal   
Neurologic: CNII-XII intact. No focal motor or sensory deficit. Labs Reviewed: 
 
BMP:  
Lab Results Component Value Date/Time  2019 09:55 AM  
 K 3.8 2019 09:55 AM  
  2019 09:55 AM  
 CO2 26 2019 09:55 AM  
 AGAP 8 2019 09:55 AM  
  (H) 2019 09:55 AM  
 BUN 10 2019 09:55 AM  
 CREA 0.87 2019 09:55 AM  
 GFRAA >60 2019 09:55 AM  
 GFRNA >60 2019 09:55 AM  
 
CMP:  
Lab Results Component Value Date/Time   2019 09:55 AM  
 K 3.8 2019 09:55 AM  
  2019 09:55 AM  
 CO2 26 2019 09:55 AM  
 AGAP 8 2019 09:55 AM  
  (H) 2019 09:55 AM  
 BUN 10 2019 09:55 AM  
 CREA 0.87 2019 09:55 AM  
 GFRAA >60 2019 09:55 AM  
 GFRNA >60 2019 09:55 AM  
 CA 8.3 (L) 2019 09:55 AM  
 ALB 3.2 (L) 2019 09:55 AM  
 TP 7.1 2019 09:55 AM  
 GLOB 3.9 2019 09:55 AM  
 AGRAT 0.8 2019 09:55 AM  
 SGOT 48 (H) 2019 09:55 AM  
 ALT 33 01/30/2019 09:55 AM  
 
CBC:  
Lab Results Component Value Date/Time WBC 5.9 01/30/2019 09:55 AM  
 HGB 13.9 01/30/2019 09:55 AM  
 HCT 39.1 01/30/2019 09:55 AM  
 PLT 57 (L) 01/30/2019 09:55 AM  
 
All Cardiac Markers in the last 24 hours:  
Lab Results Component Value Date/Time  01/30/2019 09:55 AM  
 CKMB 1.0 01/30/2019 09:55 AM  
 CKND1 1.0 01/30/2019 09:55 AM  
 TROIQ <0.02 01/30/2019 09:55 AM  
 
Recent Glucose Results:  
Lab Results Component Value Date/Time  (H) 01/30/2019 09:55 AM  
 
ABG: No results found for: PH, PHI, PCO2, PCO2I, PO2, PO2I, HCO3, HCO3I, FIO2, FIO2I 
COAGS:  
Lab Results Component Value Date/Time PTP 17.5 (H) 01/30/2019 09:55 AM  
 INR 1.5 (H) 01/30/2019 09:55 AM  
 
Liver Panel:  
Lab Results Component Value Date/Time ALB 3.2 (L) 01/30/2019 09:55 AM  
 TP 7.1 01/30/2019 09:55 AM  
 GLOB 3.9 01/30/2019 09:55 AM  
 AGRAT 0.8 01/30/2019 09:55 AM  
 SGOT 48 (H) 01/30/2019 09:55 AM  
 ALT 33 01/30/2019 09:55 AM  
 AP 81 01/30/2019 09:55 AM  
 
Pancreatic Markers: No results found for: AMYLPOCT, AML, LIPPOCT, LPSE Procedures/imaging: see electronic medical records for all procedures/Xrays and details which were not copied into this note but were reviewed prior to creation of Plan Krysta Albert MD, Internal Medicine CC: Dwayne Seals MD

## 2019-01-30 NOTE — ED TRIAGE NOTES
Patient reports blurred vision that started \"a little over a week ago\" that \"comes and goes\". Patient reports headache and ear ringing when the blurred vision starts.

## 2019-01-30 NOTE — PROGRESS NOTES
70 Nolberto Maloney MD, [address], [place], [place]       MEJIA Funk PA-C Marylin Arm, [facility]   MEJIA Coffey NP [facility] [address]    at [place] [address], [address] [facility], [address]    [phone]    FAX: [address] [place] [address], [address], [address]    [phone]    FAX: [phone]       Patient Care Team:  Kerrie Pereira MD as PCP - General (Internal Medicine)  Elisabeth Roach MD as Physician (Dermatology)      Problem List  Date Reviewed: 1/30/2019          Codes Class Noted    Cellulitis ICD-10-CM: L03.90  ICD-9-CM: 682.9  7/24/2018        Portal hypertensive gastropathy ([facility] [address] [facility] ICD-10-CM: K76.6, K31.89  ICD-9-CM: 572.3, 537.89  12/22/2013        Cirrhosis ([facility] [address] [facility] ICD-10-CM: K74.60  ICD-9-CM: 571.5  9/5/2011        Upper GI bleed secondary to NSAID induced gastritis - 9/2010 ICD-10-CM: K92.2  ICD-9-CM: 578.9  9/5/2011        S/P lumbar laminectomy with spinal fusion - 1992 ICD-10-CM: Z98.1  ICD-9-CM: V45.4  9/5/2011        Colon polyps ICD-10-CM: K63.5  ICD-9-CM: 211.3  9/5/2011    Overview Signed 9/5/2011 10:38 AM by Dominga Malcolm MD     Last colonoscopy 9/2010             Esophageal varices ([facility] [facility] ICD-10-CM: I85.00  ICD-9-CM: 456.1  9/5/2011        Thrombocytopenia secondary to cirrhosis ICD-10-CM: D69.6  ICD-9-CM: 287.5  9/5/2011        Chronic hepatitis C genotype 1 ICD-10-CM: B18.2  ICD-9-CM: 070.54  Unknown              Duarte Menard returns to the [place] [address] for 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 The HCV has been treated and cured.   The active problem list, all pertinent past medical history, medications, liver histology, endoscopic studies, radiologic findings and laboratory findings related to the liver disorder were reviewed with the patient. The patient has not been seen here since 03/2017. The patient reports that he was hospitalized at [facility] for a \"leg infection\" last year. He was treated with IV antibiotics for cellulitis. The patient has esophageal varices without bleeding. His previous UGI bleed was from portal gastropathy. Last EGD was performed in 10/2018. Mr. Joel Mendoza is on step 2 diuretics. His peripheral edema is well controlled. He is currently taking lactulose and tolerating this without complaint. Mr. Joel Mendoza participated in the Astral B clinical trial.  Unfortunately he relapsed 12 weeks post treatment. Mr. Joel Mendoza completed twenty-four weeks of treatment with [facility] in 2015. The HCV has been treated and cured. Today, the patient reports that he intends to go to the ER here after this appointment. He is complaining of blurred vision, lightheadedness, and headache. The patient completes all daily activities without any functional limitations. The patient has not experienced fatigue, fevers, chills, shortness of breath, chest pain, pain in the right side over the liver, diffuse abdominal pain, nausea, vomiting, constipation, diarrhrea, dry eyes, dry mouth, arthralgias, myalgias, yellowing of the eyes or skin, itching, dark urine, problems concentrating, swelling of the abdomen, swelling of the lower extremities, hematemesis, or hematochezia. ALLERGIES:  Allergies   Allergen Reactions    Aspirin Other (comments)     Bleeding      Valium [Diazepam] Swelling     Current Outpatient Medications   Medication Sig    furosemide (LASIX) 80 mg tablet Take 1 Tab by mouth daily.  spironolactone (ALDACTONE) 100 mg tablet Take 2 Tabs by mouth daily.  trazodone HCl (TRAZODONE, BULK,) 50 mg by Does Not Apply route.     lactulose (CHRONULAC) 10 gram/15 mL solution Take 20 g by mouth three (3) times daily. Indications: Hepatic Encephalopathy     No current facility-administered medications for this visit. SYSTEM REVIEW NOT RELATED TO LIVER DISEASE OR REVIEWED ABOVE:  Constitution systems: Negative for fever, chills, weight loss. Eyes: Negative for visual changes. ENT: Negative for sore throat, painful swallowing. Respiratory: Negative for cough, hemoptysis, SOB. Cardiology: Negative for chest pain, palpitations. GI:  Negative for constipation or diarrhea. : Negative for urinary frequency, dysuria, hematuria, nocturia. Skin: Negative for rash. Hematology: Negative for easy bruising, blood clots. Musculo-skelatal: Negative for back pain, muscle pain, weakness. Neurologic: Negative for headaches, dizziness, vertigo, memory problems not related to HE. Psychology: Negative for anxiety, depression. FAMILY/SOCIAL HISTORY:  The patient is . There are 3 children. The patient smokes tobacco. The patient has been abstinent from alcohol since 2007. The patient is currently on disability. PHYSICAL EXAMINATION:    Visit Vitals  /74 (BP 1 Location: Right arm, BP Patient Position: Sitting)   Pulse 83   Temp 98.4 °F (36.9 °C) (Tympanic)   Resp 18   Ht 5' 11\" (1.803 m)   Wt 226 lb (102.5 kg)   SpO2 97%   BMI 31.52 kg/m²       General: No acute distress. Eyes: Sclera anicteric. ENT: No oral lesions, thyroid normal.  Nodes: No adenopathy. Skin: Spider angiomata on chest, palmar erythema. Respiratory: Lungs clear to auscultation. Cardiovascular: Regular heart rate, no murmurs, no JVD. Abdomen: Soft non-tender, liver enlarged. Spleen not palpable. No obvious ascites. Umbilical hernia present. Extremities: Trace bilateral pedal edema. No muscle wasting. No gross arthritic changes. Neurologic: Alert and oriented, cranial nerves grossly intact, no asterixsis.     LABORATORY STUDIES:  Liver [place] of [address] [place] [address] & Units 10/29/2018 9/5/2018   WBC 4.6 - 13.2 K/uL 4.0 (L) 5.9   ANC 1.8 - 8.0 K/UL 3.0 4.4   HGB 13.0 - 16.0 g/dL 13.3 14.4    - 420 K/uL 49 (L) 52 (L)   INR 0.8 - 1.2   1.5 (H) 1.3 (H)   AST 15 - 37 U/L 58 (H) 77 (H)   ALT 16 - 61 U/L 37 50   Alk Phos 45 - 117 U/L 87 105   Bili, Total 0.2 - 1.0 MG/DL 2.0 (H) 3.1 (H)   Bili, Direct 0.0 - 0.2 MG/DL 0.8 (H) 1.0 (H)   Albumin 3.4 - 5.0 g/dL 3.0 (L) 3.1 (L)   BUN 7.0 - 18 MG/DL 10 10   Creat 0.6 - 1.3 MG/DL 0.85 1.04   Na 136 - 145 mmol/L 140 135 (L)   K 3.5 - 5.5 mmol/L 3.6 3.7   Cl 100 - 108 mmol/L 102 101   CO2 21 - 32 mmol/L 26 28   Glucose 74 - 99 mg/dL 87 116 (H)   Ammonia 11 - 32 UMOL/L 34 (H) 127 (H)     Cancer Screening Latest Ref Rng & Units 10/29/2018 9/5/2018 7/24/2018   AFP, Serum 0.0 - 8.0 ng/mL 9.0 (H) 9.5 (H) 8.1 (H)   AFP-L3% 0.0 - 9.9 % 7.0 6.1 7.0     SEROLOGIES:  12/10:  HAV total positive, HB surface antigen negative, anti-HB surface negative, HCV genotype 1, HCV RNA log 5.5,    7/2011: Ferritin 73, iron saturation 57%. LIVER HISTOLOGY:  07/2018. TRANSIENT HEPATIC ELASTOGRAPHY:   E Range: 9.44-22.22 kPa  E Mean: 14.32 kPa  E Median: 14.68 kPa  E Std: 3.91 kPa    ENDOSCOPIC PROCEDURES:  9/2010:  [facility] - reported to demonstrate colonic polyps. 9/2010:  EGD while hospitalized in [facility] - reported to demonstrate severe gastritis from NSAIDs with small esophageal varices. 2/2011: EGD by [facility]. Esophageal varicies. Banding performed. 11/2011:  EGD by [facility]. Severe portal gastropathy. No varices. 4/2012:  EGD by [facility]:  Grade 2 esophageal varices. Banding performed. Severe hypertensive portal gastropathy. 8/2012:  EGD per [facility]. Small esophageal varices. Severe portal hypertensive gastropathy, gastritis of the antrum, no gastric varices. Repeat in 6 months. 10/2013. EGD by Dr Bryson Francis. Small varices with stigmata of bleeding. Portal gastropathy. 07/2014:  EGD per [facility]. 2 medium esophageal varices.   Banding performed. Moderate portal hypertensive gastropathy. Repeat in one year. 9/1/2015: EGD per [facility]. Duodenal ulcer present. H. Pylori negative. Portal hypertensive gastropathy. Esophageal varices present. Four bands placed. Repeat in 6 months. 11/2015:  EGD per Dr. Pako Watts. Moderate portal gastropathy. Grade 1 esophageal varices. No gastric varices noted. 11/2015:  Colonoscopy per Dr. Pako Watts. Mild diverticulosis. 11/2016:  EGD per Dr. Robson Calles. Small esophageal varices. Severe hypertensive portal gastropathy. Repeat in 6 months. 01/2018. EGD by Dr. Perla Bolton. No report available. 10/2018. EGD by Dr. Kat Davila. Normal esophagus. Pathology negative. Repeat in 2 years. RADIOLOGY:  11/2010: Ultrasound of the liver. Echogenic consistent with cirrhosis. No liver mass lesions. No ascites. 2/2011: Ultrasound of the liver. Changes consistent with cirrhosis. No liver mass lesions. No ascites. No portal vein thrombosis. 11/2011: Ultrasound of the liver. Echogenic consistent with chronic liver disease, cirrhosis. No liver mass lesions. No ascites. 04/12:  CT of abdomen and pelvis - hepatic cirrhosis with portal hypertension with scattered upper abdominal collateralzation and varices. Likely recanalization of the umbilical artery. No mass noted. 1/2015:  Ultrasound of liver. Echogenic consistent with cirrhosis. No liver mass lesions. No dilated bile ducts. No ascites. 8/2015:  CT of the abdomen and pelvis. Coarsened, micronodular surface of the liver with widening of the interlobar fissure. No intrahepatic biliary ductal dilation. Stable enlargement of the portal vein with recanalization of the umbilical vein. Paraesophageal varices. Grossly stable splenomegaly  1/2016:  Ultrasound of liver. Echogenic consistent with cirrhosis. No liver mass lesions. No dilated bile ducts. No ascites. 11/2016:  Ultrasound of the liver. Echogenic consistent with cirrhosis. No liver mass lesions.   No dilated bile ducts. No ascites. 05/2018. Abdominal ultrasound. Coarsened hepatic echogenicity consistent with hepatic parenchymal disease. No focal lesions or biliary dilatation demonstrated. 07/2018. Ultrasound of the liver. Cirrhotic hepatic morphology without focal hepatic lesion. 125/2018. Ultrasound of the liver. Stable right upper abdominal ultrasound, since 6-2018. Findings consistent with cirrhosis/chronic hepatocellular disease. Recanalized paraumbilical left hepatic vein. No perihepatic ascites. but without evidence of discrete focal mass    OTHER TESTING:  Not performed    ASSESSMENT AND PLAN:   Cirrhosis secondary to HCV. The HCV has been treated and cured. The most recent laboratory studies indicate that the AST is elevated, the ALT is normal, alkaline phosphatase is normal, tests of hepatic synthetic and metabolic function are depressed, and the platelet count is depressed. Will perform laboratory testing to monitor liver function and degree of liver injury. This will include hepatic panel, a CBC w/ diff, a BMP, a PT/INR, an ammonia level, and an AFP-L3%. Complications of cirrhosis were discussed in detail. We discussed thrombocytopenia, portal hypertension, varices, GI bleeding, peripheral edema, ascites, hepatic encephalopathy, and hepatocellular carcinoma. We discussed the need for follow ups on a regular basis, at 3 month intervals to monitor for complications. We discussed the need for every 6 month liver imaging studies. HCV treatment. Mr. Gato Fajardo completed twenty-four weeks of treatment with [facility] with good toleration in 09/2016. He remained HCV RNA undetectable one year post treatment. Upper GI bleed appears to have been due to portal gastropathy. Repeat EGD was performed in 10/2018. Normal esophagus. Some gastritis of the atrium. Pathology is negative. Repeat EGD in 10/2020. Hepatic encephalopathy. I have ordered an ammonia level today.  The patient was instructed not to operate a motor vehicle because of HE which poses an increased risk for MVA. Ammonia level elevated labs on from 09/2018 was 157 but had improved to 34 on labs from 10/31/2018. He reports that he is not taking the lactulose because \"I am going to the bathroom\". I encouraged the patietn to resume the lactulose at least once daily for covert HE. Lower extremity. The patient is currently on step 2 diuretics. Continue this dose. Edema well controlled. BUN/CRT are WNL. I have spoken to him about the importance of salt restrictions. The patient was counseled regarding alcohol consumption. Vaccination for viral hepatitis B is recommended since the patient has no serologic evidence of previous exposure or vaccination with immunity. Vaccination for viral hepatitis A is not required. The patient has serologic evidence of prior exposure or vaccination with immunity. [facility] screening will be performed. AFP ordered today. Abdominal ultrasound ordered. This will be performed with shear wave elastography. Elastography  can assess liver fibrosis and determine if a patient has advanced fibrosis or cirrhosis without the need for liver biopsy. Thrombocytopenia secondary to cirrhosis from chronic HCV. No treatment necessary. [facility] in 3 months. All of the above issues were discussed with the patient. All questions were answered. The patient expressed a clear understanding of the above.     JOSEFA Beasley-MARIA TERESA  Liver [place] of [address] [place] [address],B-[address]  [address], [address] [place], [address]   [phone]

## 2019-01-30 NOTE — PROGRESS NOTES
Tracee Cardenas is a 59 y.o. male      1. Have you been to the ER, urgent care clinic or hospitalized since your last visit? NO.     2. Have you seen or consulted any other health care providers outside of the [address] [place] [address] since your last visit (Include any pap smears or colon screening)?  NO            Learning Assessment 1/30/2019   PRIMARY LEARNER Patient   BARRIERS PRIMARY LEARNER NONE   CO-LEARNER CAREGIVER No   PRIMARY LANGUAGE ENGLISH   LEARNER PREFERENCE PRIMARY LISTENING   ANSWERED BY PATIENT   RELATIONSHIP SELF

## 2019-01-30 NOTE — PROGRESS NOTES
TRANSFER - IN REPORT: 
 
Verbal report received from [facility] RN(name) on Svetlana Phillips  being received from ED(unit) for routine progression of care Report consisted of patients Situation, Background, Assessment and  
Recommendations(SBAR). Information from the following report(s) SBAR, Kardex, ED Summary, Intake/Output, MAR, Accordion, Recent Results and Alarm Parameters  was reviewed with the receiving nurse. Opportunity for questions and clarification was provided. Assessment completed upon patients arrival to unit and care assumed. Shift Summary: Shift uneventful. No complaints of chest pain or shortness of breath. Call light is within reach.

## 2019-01-30 NOTE — PROGRESS NOTES
Problem: Self Care Deficits Care Plan (Adult) Goal: *Acute Goals and Plan of Care (Insert Text) Initial OT STGs (1/30/2019) Within 7 days: 1. Patient will perform toilet transfer with Mod I in preparation for bowel and bladder management. 2. Patient will perform UB and LB dressing with Mod I  
3. Patient will complete visually scanning in all quadrants WFL. 4. Patient will verbalized understanding of strategies to help reduce diplopia as part of HEP. 5. Patient will perform grooming tasks while standing at sink with Mod I. Outcome: Progressing Towards Goal 
Occupational Therapy EVALUATION Patient: Monica Pierce (56 y.o. male) Date: 1/30/2019 Primary Diagnosis: CVA (cerebral vascular accident) ([facility] Precautions: blurred vision ASSESSMENT : 
Based on the objective data described below, the patient presents with impaired vision along with pain of head/back that is impacting pt's overall functional performance. Pt was sitting on EOB upon arrival. Pt present with WFL ROM of BUE and strength as well as intact FMC/GMC. Pt is able to visually track in [facility]. Pt with blurriness-near and far. Pt does not wear glasses and had cataract surgery of valentino eyes. Pt's peripheral vision was intact. Pt will benefit from OT to address vision impairments and ensure pt does not decline with ADLs during stay. Pt is highly motivated and was I prior. Pt would benefit from continued OT services to address performance with ADLs and functional transfers/mobility to return closely to [facility]. Education: Role of OT, Stroke symptoms, Vision Patient will benefit from skilled intervention to address the above impairments. Patients rehabilitation potential is considered to be Good Factors which may influence rehabilitation potential include:  
[x]             None noted []             Mental ability/status []             Medical condition []             Home/family situation and support systems []             Safety awareness []             Pain tolerance/management 
[]             Other: PLAN : 
Recommendations and Planned Interventions: 
[x]               Self Care Training                  [x]        Therapeutic Activities [x]               Functional Mobility Training    []        Cognitive Retraining 
[x]               Therapeutic Exercises           [x]        Endurance Activities []               Balance Training                   [x]        Neuromuscular Re-Education [x]               Visual/Perceptual Training     [x]   Home Safety Training 
[x]               Patient Education                 [x]        Family Training/Education []               Other (comment): Frequency/Duration: Patient will be followed by occupational therapy 3-5x/wk for 1week to address goals. Discharge Recommendations: TBD Further Equipment Recommendations for Discharge: TBD SUBJECTIVE:  
Patient stated I am so hungry.  OBJECTIVE DATA SUMMARY:  
 
Past Medical History:  
Diagnosis Date  Cancer ([facility] 11/2011  
 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  
 Hep C w/ coma, chronic ([facility] cured from Hep C   
 Hepatitis C Cured in 2-2016  Ill-defined condition  Liver disease  Other ill-defined conditions(799.89)   
 hands and bottom of feet going numb lately  Skin cancer [address] [employer] left cheek 2011  Upper GI bleed Secondary to NSAID induced gastritis, 09/10. Past Surgical History:  
Procedure Laterality Date  EGD  09/10 EGD while hospitalized in [place]-reported to demonstrate severe gastritis from [facility] with small esophageal varices.  ENDOSCOPY, COLON, DIAGNOSTIC  09/10 Colonoscopy while in hospitalized in [facility]- reported to demonstrate colonic polyps  HX CATARACT REMOVAL  2018  HX HERNIA REPAIR    
 umbilical  
 HX LUMBAR LAMINECTOMY  1992  HX MALIGNANT SKIN LESION EXCISION  11/1/2011  
 standard excision to L cheek d/t basal cell carcinoma  HX TONSILLECTOMY Barriers to Learning/Limitations: None Compensate with: visual, verbal, tactile, kinesthetic cues/model Prior Level of Function/Home Situation: Pt was Ind with ADLs/IADLs prior. Home Situation Living Alone: Yes Support Systems: Family member(s) Patient Expects to be Discharged to[de-identified] Private residence Cognitive/Behavioral Status: 
Neurologic State: Alert; Appropriate for age Orientation Level: Appropriate for age;Oriented X4 Cognition: Appropriate decision making; Appropriate for age attention/concentration; Appropriate safety awareness; Follows commands Safety/Judgement: Awareness of environment Skin: BUE skin intact Edema: none noted Vision/Perceptual:   
Tracking: Able to track stimulus in all quadrants w/o difficulty Convergence: (unable to fully assess as pt with blurriness) Visual Fields: Count includes the [facility]) Diplopia: Yes(comes and goes) Acuity: Impaired near vision; Impaired far vision(blurriness) Corrective Lenses: (Does not wear glasses prior) Coordination: 
Coordination: Within functional limits Fine Motor Skills-Upper: Left Intact; Right Intact Gross Motor Skills-Upper: Left Intact; Right Intact Strength: BUE Strength: Within functional limits Tone & Sensation: BUE Tone: Normal 
Sensation: Impaired(did report of coming/going numbness/tingling of L side face) Range of Motion: BUE 
AROM: Within functional limits PROM: Within functional limits Functional Mobility and Transfers for ADLs: 
Bed Mobility: 
 Supine to Sit: Modified independent Sit to Supine: Modified independent Scooting: Modified independent ADL Assessment:  
Feeding: Modified independent Cognitive Retraining Safety/Judgement: Awareness of environment Pain: 
Pre-treatment: 6/10 Post-treatment: 6/10 nurse plans to give pt pain medication Activity Tolerance:  
Pt overall tolerated session well with no signs of fatigue or distress. Please refer to the flowsheet for vital signs taken during this treatment. After treatment:  
[] Patient left in no apparent distress sitting up in chair 
[x] Patient left in no apparent distress in bed 
[x] Call bell left within reach [x] Nursing notified 
[] Caregiver present 
[] Bed alarm activated COMMUNICATION/EDUCATION:  
[x] Home safety education was provided and the patient/caregiver indicated understanding. [x] Patient/family have participated as able in goal setting and plan of care. [x] Patient/family agree to work toward stated goals and plan of care. [] Patient understands intent and goals of therapy, but is neutral about his/her participation. [] Patient is unable to participate in goal setting and plan of care. Thank you for this referral. 
Brendan Samaniego, OTR/L Time Calculation: 16 mins Eval Complexity: History: MEDIUM Complexity : Expanded review of history including physical, cognitive and psychosocial  history ; Examination: LOW Complexity : 1-3 performance deficits relating to physical, cognitive , or psychosocial skils that result in activity limitations and / or participation restrictions ; Decision Making:LOW Complexity : No comorbidities that affect functional and no verbal or physical assistance needed to complete eval tasks

## 2019-01-31 ENCOUNTER — APPOINTMENT (OUTPATIENT)
Dept: MRI IMAGING | Age: 64
DRG: 066 | End: 2019-01-31
Attending: PSYCHIATRY & NEUROLOGY
Payer: MEDICARE

## 2019-01-31 ENCOUNTER — APPOINTMENT (OUTPATIENT)
Dept: CT IMAGING | Age: 64
DRG: 066 | End: 2019-01-31
Attending: HOSPITALIST
Payer: MEDICARE

## 2019-01-31 ENCOUNTER — APPOINTMENT (OUTPATIENT)
Dept: NON INVASIVE DIAGNOSTICS | Age: 64
DRG: 066 | End: 2019-01-31
Attending: HOSPITALIST
Payer: MEDICARE

## 2019-01-31 LAB
AFP L3 MFR SERPL: 7.6 % (ref 0–9.9)
AFP SERPL-MCNC: 10.8 NG/ML (ref 0–8)
AMMONIA PLAS-SCNC: 39 UMOL/L (ref 11–32)
CHOLEST SERPL-MCNC: 158 MG/DL
CK MB CFR SERPL CALC: 1.2 % (ref 0–4)
CK MB CFR SERPL CALC: 1.4 % (ref 0–4)
CK MB SERPL-MCNC: 1.2 NG/ML (ref 5–25)
CK MB SERPL-MCNC: 1.4 NG/ML (ref 5–25)
CK SERPL-CCNC: 103 U/L (ref 39–308)
CK SERPL-CCNC: 98 U/L (ref 39–308)
ECHO AO ARCH DIAM: 3.01 CM
ECHO AO ASC DIAM: 3.39 CM
ECHO AO ROOT DIAM: 3.72 CM
ECHO AV AREA PEAK VELOCITY: 3.1 CM2
ECHO AV AREA VTI: 3.1 CM2
ECHO AV AREA/BSA PEAK VELOCITY: 1.4 CM2/M2
ECHO AV AREA/BSA VTI: 1.4 CM2/M2
ECHO AV MEAN GRADIENT: 5.6 MMHG
ECHO AV PEAK GRADIENT: 10.6 MMHG
ECHO AV PEAK VELOCITY: 162.9 CM/S
ECHO AV VTI: 30.91 CM
ECHO LA MAJOR AXIS: 3.94 CM
ECHO LA VOL 2C: 54.96 ML (ref 18–58)
ECHO LA VOL 4C: 54.9 ML (ref 18–58)
ECHO LA VOL BP: 62.95 ML (ref 18–58)
ECHO LA VOL/BSA BIPLANE: 28.34 ML/M2 (ref 16–28)
ECHO LA VOLUME INDEX A2C: 24.74 ML/M2 (ref 16–28)
ECHO LA VOLUME INDEX A4C: 24.71 ML/M2 (ref 16–28)
ECHO LV E' LATERAL VELOCITY: 0.11 CM/S
ECHO LV E' SEPTAL VELOCITY: 0.09 CM/S
ECHO LV EDV A2C: 60.6 ML
ECHO LV EDV A4C: 95.3 ML
ECHO LV EDV BP: 76.7 ML (ref 67–155)
ECHO LV EDV INDEX A4C: 42.9 ML/M2
ECHO LV EDV INDEX BP: 34.5 ML/M2
ECHO LV EDV NDEX A2C: 27.3 ML/M2
ECHO LV EJECTION FRACTION A2C: 51 %
ECHO LV EJECTION FRACTION A4C: 70 %
ECHO LV EJECTION FRACTION BIPLANE: 61.2 % (ref 55–100)
ECHO LV ESV A2C: 29.6 ML
ECHO LV ESV A4C: 28.7 ML
ECHO LV ESV BP: 29.8 ML (ref 22–58)
ECHO LV ESV INDEX A2C: 13.3 ML/M2
ECHO LV ESV INDEX A4C: 12.9 ML/M2
ECHO LV ESV INDEX BP: 13.4 ML/M2
ECHO LV INTERNAL DIMENSION DIASTOLIC: 4.7 CM (ref 4.2–5.9)
ECHO LV INTERNAL DIMENSION SYSTOLIC: 3.16 CM
ECHO LV IVSD: 0.83 CM (ref 0.6–1)
ECHO LV MASS 2D: 153.1 G (ref 88–224)
ECHO LV MASS INDEX 2D: 68.9 G/M2 (ref 49–115)
ECHO LV POSTERIOR WALL DIASTOLIC: 0.88 CM (ref 0.6–1)
ECHO LVOT DIAM: 2.01 CM
ECHO LVOT PEAK GRADIENT: 10.1 MMHG
ECHO LVOT PEAK VELOCITY: 158.86 CM/S
ECHO LVOT VTI: 30.14 CM
ECHO MV A VELOCITY: 110.64 CM/S
ECHO MV AREA PHT: 3.1 CM2
ECHO MV E DECELERATION TIME (DT): 245.9 MS
ECHO MV E VELOCITY: 0.98 CM/S
ECHO MV E/A RATIO: 0.01
ECHO MV E/E' LATERAL: 8.91
ECHO MV E/E' RATIO (AVERAGED): 9.9
ECHO MV E/E' SEPTAL: 10.89
ECHO MV PRESSURE HALF TIME (PHT): 71.3 MS
ECHO PULMONARY ARTERY SYSTOLIC PRESSURE (PASP): 18 MMHG
ECHO PV MAX VELOCITY: 137.83 CM/S
ECHO PV PEAK GRADIENT: 7.6 MMHG
ECHO RA AREA 4C: 14.56 CM2
ECHO RV INTERNAL DIMENSION: 3.52 CM
ECHO TRICUSPID ANNULAR PEAK SYSTOLIC VELOCITY: 2.7 CM/S
ECHO TV REGURGITANT MAX VELOCITY: 194.16 CM/S
ECHO TV REGURGITANT PEAK GRADIENT: 15.1 MMHG
EST. AVERAGE GLUCOSE BLD GHB EST-MCNC: NORMAL MG/DL
HBA1C MFR BLD: 4.5 % (ref 4.2–5.6)
HDLC SERPL-MCNC: 52 MG/DL (ref 40–60)
HDLC SERPL: 3 {RATIO} (ref 0–5)
LDLC SERPL CALC-MCNC: 80.4 MG/DL (ref 0–100)
LIPID PROFILE,FLP: NORMAL
TRIGL SERPL-MCNC: 128 MG/DL (ref ?–150)
TROPONIN I SERPL-MCNC: <0.02 NG/ML (ref 0–0.04)
TROPONIN I SERPL-MCNC: <0.02 NG/ML (ref 0–0.04)
VLDLC SERPL CALC-MCNC: 25.6 MG/DL

## 2019-01-31 PROCEDURE — 36415 COLL VENOUS BLD VENIPUNCTURE: CPT

## 2019-01-31 PROCEDURE — 97112 NEUROMUSCULAR REEDUCATION: CPT

## 2019-01-31 PROCEDURE — A9575 INJ GADOTERATE MEGLUMI 0.1ML: HCPCS | Performed by: HOSPITALIST

## 2019-01-31 PROCEDURE — 96125 COGNITIVE TEST BY HC PRO: CPT

## 2019-01-31 PROCEDURE — 93306 TTE W/DOPPLER COMPLETE: CPT

## 2019-01-31 PROCEDURE — 80061 LIPID PANEL: CPT

## 2019-01-31 PROCEDURE — 70496 CT ANGIOGRAPHY HEAD: CPT

## 2019-01-31 PROCEDURE — 70546 MR ANGIOGRAPH HEAD W/O&W/DYE: CPT

## 2019-01-31 PROCEDURE — 74011250637 HC RX REV CODE- 250/637: Performed by: HOSPITALIST

## 2019-01-31 PROCEDURE — 74011636320 HC RX REV CODE- 636/320: Performed by: HOSPITALIST

## 2019-01-31 PROCEDURE — 74011250636 HC RX REV CODE- 250/636: Performed by: HOSPITALIST

## 2019-01-31 PROCEDURE — 82550 ASSAY OF CK (CPK): CPT

## 2019-01-31 PROCEDURE — 83036 HEMOGLOBIN GLYCOSYLATED A1C: CPT

## 2019-01-31 PROCEDURE — 74011000250 HC RX REV CODE- 250: Performed by: HOSPITALIST

## 2019-01-31 PROCEDURE — 97116 GAIT TRAINING THERAPY: CPT

## 2019-01-31 PROCEDURE — 74011250637 HC RX REV CODE- 250/637: Performed by: INTERNAL MEDICINE

## 2019-01-31 PROCEDURE — 65660000000 HC RM CCU STEPDOWN

## 2019-01-31 PROCEDURE — 82140 ASSAY OF AMMONIA: CPT

## 2019-01-31 PROCEDURE — 92610 EVALUATE SWALLOWING FUNCTION: CPT

## 2019-01-31 RX ORDER — SODIUM CHLORIDE 9 MG/ML
30 INJECTION INTRAMUSCULAR; INTRAVENOUS; SUBCUTANEOUS ONCE
Status: COMPLETED | OUTPATIENT
Start: 2019-01-31 | End: 2019-01-31

## 2019-01-31 RX ORDER — SODIUM CHLORIDE 9 MG/ML
INJECTION INTRAMUSCULAR; INTRAVENOUS; SUBCUTANEOUS
Status: DISPENSED
Start: 2019-01-31 | End: 2019-01-31

## 2019-01-31 RX ORDER — GUAIFENESIN 100 MG/5ML
100 SOLUTION ORAL
Status: DISCONTINUED | OUTPATIENT
Start: 2019-01-31 | End: 2019-02-03 | Stop reason: HOSPADM

## 2019-01-31 RX ADMIN — ACETAMINOPHEN 650 MG: 325 TABLET ORAL at 15:28

## 2019-01-31 RX ADMIN — OXYCODONE AND ACETAMINOPHEN 1 TABLET: 5; 325 TABLET ORAL at 04:31

## 2019-01-31 RX ADMIN — OXYCODONE AND ACETAMINOPHEN 1 TABLET: 5; 325 TABLET ORAL at 14:29

## 2019-01-31 RX ADMIN — GUAIFENESIN 100 MG: 200 SOLUTION ORAL at 15:36

## 2019-01-31 RX ADMIN — ACETAMINOPHEN 650 MG: 325 TABLET ORAL at 06:42

## 2019-01-31 RX ADMIN — GUAIFENESIN 100 MG: 200 SOLUTION ORAL at 04:31

## 2019-01-31 RX ADMIN — SODIUM CHLORIDE 100 ML/HR: 900 INJECTION, SOLUTION INTRAVENOUS at 04:38

## 2019-01-31 RX ADMIN — Medication 10 ML: at 21:41

## 2019-01-31 RX ADMIN — ACETAMINOPHEN 650 MG: 325 TABLET ORAL at 23:50

## 2019-01-31 RX ADMIN — GADOTERATE MEGLUMINE 20 ML: 376.9 INJECTION INTRAVENOUS at 14:16

## 2019-01-31 RX ADMIN — SODIUM CHLORIDE 30 ML: 9 INJECTION, SOLUTION INTRAMUSCULAR; INTRAVENOUS; SUBCUTANEOUS at 10:51

## 2019-01-31 RX ADMIN — OXYCODONE AND ACETAMINOPHEN 1 TABLET: 5; 325 TABLET ORAL at 22:19

## 2019-01-31 RX ADMIN — ATORVASTATIN CALCIUM 20 MG: 20 TABLET, FILM COATED ORAL at 08:39

## 2019-01-31 RX ADMIN — IOPAMIDOL 80 ML: 755 INJECTION, SOLUTION INTRAVENOUS at 01:48

## 2019-01-31 NOTE — PROGRESS NOTES
Problem: Mobility Impaired (Adult and Pediatric) Goal: *Acute Goals and Plan of Care (Insert Text) Physical Therapy Goals Initiated 1/30/2019 and to be accomplished within 3-5 day(s) 1. Patient will move from supine <> sit with S in prep for out of bed activity and change of position. 2.  Patient will perform sit<> stand with S with LRAD in prep for transfers/ambulation. 3.  Patient will transfer from bed <> chair with S with LRAD for time up in chair for completion of ADL activity. 4.  Patient will ambulate 150 feet with LRAD/S for improved functional mobility/safe discharge. 5.  Patient will ascend/descend 3-5 stairs with handrail with minimal assistance/contact guard assist for home re-entry as needed. Outcome: Progressing Towards Goal 
physical Therapy EVALUATION Patient: Trinh Zepeda (27 y.o. male) Date: 1/30/2019 Primary Diagnosis: CVA (cerebral vascular accident) ([facility] Precautions: Fall ASSESSMENT : 
Based on the objective data described below, the patient presents with decrease independence w/ bed mobility, transfers gait, and step negotiation. Pt seen in supine prior to session w/ telemonitor donned. Pt reported having a L sided HA 6/10 pain during session. Pt reports PTA that he was currently driving and able to perform mobility task w/o any assistance. Pt reports he has a SPC and RW but does not use it. Pt reports that he is currently not having any sxs of lightheadedness or dizziness but reports that he has been during his stay at the hospital. Pt able to ambulate 120 ft w/ GB but demonstrates initially an unsteady gait, however pt able to increase stability as he continues to ambulate. Pt reported increase HA and feeling dizzy while ambulating so pt was transferred back to the room. Pt left sitting at the EOB after session as OT was present in the room, call bell and tray in reach, nurse notified after session. Patient will benefit from skilled intervention to address the above impairments. Patients rehabilitation potential is considered to be Good Factors which may influence rehabilitation potential include:  
[]         None noted 
[]         Mental ability/status []         Medical condition 
[]         Home/family situation and support systems 
[]         Safety awareness 
[]         Pain tolerance/management 
[]         Other: PLAN : 
Recommendations and Planned Interventions: 
[]           Bed Mobility Training             []    Neuromuscular Re-Education []           Transfer Training                   []    Orthotic/Prosthetic Training 
[]           Gait Training                          []    Modalities []           Therapeutic Exercises          []    Edema Management/Control 
[]           Therapeutic Activities            []    Patient and Family Training/Education 
[]           Other (comment): Frequency/Duration: Patient will be followed by physical therapy 1-2 times per day to address goals. Discharge Recommendations: Home Health Further Equipment Recommendations for Discharge: N/A  
 
SUBJECTIVE:  
Patient stated I really just want to know what my ammonia levels are.  OBJECTIVE DATA SUMMARY:  
 
Past Medical History:  
Diagnosis Date  [facility] ([email or web address] 11/2011  
 basal cell carcinoma/L cheek  Cirrhosis ([email or web address] Secondary to chronic HCV  Contact dermatitis and other eczema, due to unspecified cause  [facility] in [facility]  GERD (gastroesophageal reflux disease) H/O in the past  
 Hep C w/ coma, chronic ([email or web address] cured from Hep C   
 Hepatitis C Cured in 2-2016  Ill-defined condition  Liver disease  Other ill-defined conditions(799.89)   
 hands and bottom of feet going numb lately  Skin cancer [address] left cheek 2011  Upper GI bleed Secondary to NSAID induced gastritis, 09/10. Past Surgical History: Procedure Laterality Date  EGD  09/10 EGD while hospitalized in [place]-reported to demonstrate severe gastritis from [facility] with small esophageal varices.  ENDOSCOPY, COLON, DIAGNOSTIC  09/10 Colonoscopy while in hospitalized in [facility]- reported to demonstrate colonic polyps  HX CATARACT REMOVAL  2018  HX HERNIA REPAIR    
 umbilical  
 HX LUMBAR LAMINECTOMY  1992  HX MALIGNANT SKIN LESION EXCISION  11/1/2011  
 standard excision to L cheek d/t basal cell carcinoma  HX TONSILLECTOMY Barriers to Learning/Limitations: yes;  physical 
Compensate with: Verbal Cues and Tactile Cues Prior Level of Function/Home Situation:  
Home Situation Home Environment: Other (comment)([facility] home) # Steps to Enter: 3 Rails to Enter: No 
One/Two Story Residence: Two story # of Interior Steps: 12 Living Alone: Yes Support Systems: Family member(s) Patient Expects to be Discharged to[de-identified] Private residence Current DME Used/Available at Home: NoneCritical Behavior: 
Neurologic State: Alert; Appropriate for age Orientation Level: Appropriate for age;Oriented X4 Cognition: Appropriate decision making; Appropriate for age attention/concentration; Appropriate safety awareness; Follows commands Safety/Judgement: Awareness of environment Psychosocial 
Patient Behaviors: Calm; Cooperative Purposeful Interaction: Yes Pt Identified Daily Priority: Clinical issues (comment) [employer] Process: Nurture loving kindness;Establish trust;Teaching/learning; Attend basic human needs Caring Interventions: Reassure; Therapeutic modalities Reassure: Therapeutic listening; Informing; Sit with patient;Caring rounds Therapeutic Modalities: Humor; Intentional therapeutic touchStrength:   
Strength: Within functional limits Tone & Sensation:  
Tone: Normal 
Sensation: Impaired(did report of coming/going numbness/tingling of L side face) Range Of Motion: 
AROM: Within functional limits PROM: Within functional limits Functional Mobility: 
Bed Mobility: 
Supine to Sit: Modified independent Sit to Supine: Modified independent Scooting: Modified independent Transfers: 
Sit to Stand: Supervision Stand to Sit: Supervision Balance:  
Sitting: Intact Standing: Intact; Without supportAmbulation/Gait Training: 
Distance (ft): 120 Feet (ft) Assistive Device: Gait belt Ambulation - Level of Assistance: Contact guard assistance Gait Description (WDL): Exceptions to [facility] Gait Abnormalities: Decreased step clearance; Path deviations Base of Support: Widened Stance: Weight shift Speed/Joy: Slow Step Length: Left shortened;Right shortened Swing Pattern: Left asymmetrical;Right asymmetrical 
Pain: 
Pain Scale 1: Numeric (0 - 10) Pain Intensity 1: 6 Pain Location 1: Head Activity Tolerance:  
Fair Please refer to the flowsheet for vital signs taken during this treatment. After treatment:  
[]         Patient left in no apparent distress sitting up in chair 
[x]         Patient left in no apparent distress in bed 
[x]         Call bell left within reach [x]         Nursing notified 
[]         Caregiver present 
[]         Bed alarm activated COMMUNICATION/EDUCATION:  
[x]         Fall prevention education was provided and the patient/caregiver indicated understanding. [x]         Patient/family have participated as able in goal setting and plan of care. [x]         Patient/family agree to work toward stated goals and plan of care. []         Patient understands intent and goals of therapy, but is neutral about his/her participation. []         Patient is unable to participate in goal setting and plan of care. Thank you for this referral. 
Kat French, PT Time Calculation: 14 mins

## 2019-01-31 NOTE — PROGRESS NOTES
Hospitalist Progress Note-critical care note Patient: Sheyla Andrews MRN: [id number]  CSN: [id number] YOB: 1955  Age: 59 y.o. Sex: male DOA: 1/30/2019 LOS:  LOS: 1 day Chief complaint: cva, cirrhosis , elevated inr ,  
 
Assessment/Plan Hospital Problems  Date Reviewed: 1/30/2019 Codes Class Noted POA * (Principal) CVA (cerebral vascular accident) ([facility] [address] ICD-10-CM: I63.9 ICD-9-CM: 434.91  1/30/2019 Unknown Elevated INR ICD-10-CM: R79.1 ICD-9-CM: 790.92  1/30/2019 Unknown Back pain ICD-10-CM: M54.9 ICD-9-CM: 724.5  1/30/2019 Unknown Chest pressure ICD-10-CM: R07.89 ICD-9-CM: 786.59  1/30/2019 Unknown Cirrhosis ([facility] ICD-10-CM: K74.60 ICD-9-CM: 571.5  9/5/2011 Yes Thrombocytopenia secondary to cirrhosis ICD-10-CM: D69.6 ICD-9-CM: 287.5  9/5/2011 Yes Stroke Makeda Atkins Still having on and off blurred vision Continue neuro check, mri brain done -acute/subacute infarct anterior aspect of the left temporal stem Allergic to aspirin  
plavix was hold due to elevated inr and thrombocytopenia Continue neuro check-discussed with Dr. Rene Pettit.  
cta neck/head No hemodynamically significant cervical vascular stenosis 
mrv in the process r/o venous thrombosis Echo done, still pending, continue  cardiac monitoring  
 
  
Elevated inr Due to cirrhosis  
  
Cirrhosis F/u with dr. August Signs,  
Ammonia level monitor -39 . Will d.c iv fluid and restart level home meds.  
  
Thrombocytopenia Continue monitor platelet, stable  
  
Chest pressure Resolved. ce wnl, ekg good Continue ce trend Echo done,still pending Subjective : still on and off blurred vision Disposition :1-2 days Review of systems: 
 
General: No fevers or chills. Cardiovascular: No chest pain or pressure. No palpitations. Pulmonary: No shortness of breath. Gastrointestinal: No nausea, vomiting. Neuro: on and off double vision Vital signs/Intake and Output: 
Visit Vitals /87 Pulse 92 Temp 98 °F (36.7 °C) Resp 18 Ht 5' 11\" (1.803 m) Wt 102.5 kg (226 lb) SpO2 96% BMI 31.52 kg/m² Current Shift:  01/31 0701 - 01/31 1900 In: 360 [P.O.:360] Out: 800 [Urine:800] Last three shifts:  01/29 1901 - 01/31 0700 In: 1381.7 [P.O.:1180; I.V.:201.7] Out: 1400 [[id number]] Physical Exam: 
General: WD, WN. Alert, cooperative, no acute distress   
HEENT: NC, Atraumatic. PERRLA, anicteric sclerae. Lungs: CTA Bilaterally. No Wheezing/Rhonchi/Rales. Heart:  Regular  rhythm,  No murmur, No Rubs, No Gallops Abdomen: Soft, Non distended, Non tender.  +Bowel sounds, Extremities: No c/c/e Psych:   Not anxious or agitated. Neurologic:  No acute neurological deficit. Labs: Results:  
   
Chemistry Recent Labs  
  01/30/19 
1120 *   
K 3.8  CO2 26 BUN 10  
CREA 0.87 CA 8.3* AGAP 8  
BUCR 11* AP 81  
TP 7.1 ALB 3.2*  
GLOB 3.9 AGRAT 0.8 CBC w/Diff Recent Labs  
  01/30/19 
0955 WBC 5.9  
RBC 4.03* HGB 13.9 HCT 39.1 PLT 57* GRANS 88* LYMPH 5* EOS 1 Cardiac Enzymes Recent Labs  
  01/31/19 
0702 01/31/19 
0105  98 CKND1 1.4 1.2 Coagulation Recent Labs  
  01/30/19 
0955 PTP 17.5* INR 1.5* Lipid Panel Lab Results Component Value Date/Time Cholesterol, total 158 01/31/2019 01:05 AM  
 HDL Cholesterol 52 01/31/2019 01:05 AM  
 LDL, calculated 80.4 01/31/2019 01:05 AM  
 VLDL, calculated 25.6 01/31/2019 01:05 AM  
 Triglyceride 128 01/31/2019 01:05 AM  
 CHOL/HDL Ratio 3.0 01/31/2019 01:05 AM  
  
BNP No results for input(s): BNPP in the last 72 hours. Liver Enzymes Recent Labs  
  01/30/19 
0955 TP 7.1 ALB 3.2* AP 81 SGOT 48* Thyroid Studies No results found for: T4, T3U, TSH, TSHEXT Procedures/imaging: see electronic medical records for all procedures/Xrays and details which were not copied into this note but were reviewed prior to creation of Plan Panda Lee MD

## 2019-01-31 NOTE — CONSULTS
NEUROLOGY CONSULTATION NOTE    Patient: Jessy Valdes MRN: [id number]  CSN: [id number]    YOB: 1955  Age: 59 y.o. Sex: male    DOA: 1/30/2019 LOS:  LOS: 1 day        Requesting Physician: Dr. Manjit Bill  Reason for Consultation: stroke               HISTORY OF PRESENT ILLNESS:   Jessy Valdes is a 59 y.o. male who hx of cirrhosis, HCV presents to ER for intermittent diplopia and headache for a week. The diplopia is resolved after he covers one eye, it is vertical diplopia on all gaze. He usually has severe throbbing apin in left top of head when he has the headache. Each episode lasts for about 10 minutes except last night he has more frequent double vision. Stroke Work-up:  Brain MRI:     Addendum Date: 1/30/2019    Probable recent acute/subacute infarct involving the anterior aspect of the left temporal stem. No evidence of associated hemorrhage or mass effect. Ct Head Wo Cont    CT head Result Date: 1/30/2019  IMPRESSION: 1. Punctate left basal ganglia hypodensity, age indeterminate suggest correlation with level and site of clinical symptoms to evaluate significance. 2. Minor white matter findings non-specific, but typically reflecting chronic ischemic change in a patient of this age. Please note that head CT may be negative in the acute setting and MRI could best further evaluate for acute/subacute ischemia/infarct in the high/persistent index of clinical suspicion. Cta Head Neck W Wo Cont:   Result Date: 1/31/2019      IMPRESSION: 1. No hemodynamically significant cervical vascular stenosis. 2. Unremarkable brain CTA.       Lipid panel:   Lab Results   Component Value Date/Time    Cholesterol, total 158 01/31/2019 01:05 AM    HDL Cholesterol 52 01/31/2019 01:05 AM    LDL, calculated 80.4 01/31/2019 01:05 AM    VLDL, calculated 25.6 01/31/2019 01:05 AM    Triglyceride 128 01/31/2019 01:05 AM    CHOL/HDL Ratio 3.0 01/31/2019 01:05 AM     HbA1c:   Lab Results   Component Value Date/Time Hemoglobin A1c 4.5 01/31/2019 01:05 AM         PAST MEDICAL HISTORY:  Past Medical History:   Diagnosis Date    Cancer ([facility] 11/2011    basal cell carcinoma/L cheek    Cirrhosis ([facility]     Secondary to chronic HCV    Contact dermatitis and other eczema, due to unspecified cause     Esophageal varices in cirrhosis (HCC)     GERD (gastroesophageal reflux disease)     H/O in the past    Hep C w/ coma, chronic ([facility]     cured from Hep C     Hepatitis C     Cured in 2-2016    Ill-defined condition     Liver disease     Other ill-defined conditions(799.89)     hands and bottom of feet going numb lately    Skin cancer     BCC left cheek 2011    Upper GI bleed     Secondary to NSAID induced gastritis, 09/10. PAST SURGICAL HISTORY:  Past Surgical History:   Procedure Laterality Date    EGD  09/10    EGD while hospitalized in [place]-reported to demonstrate severe gastritis from [facility] with small esophageal varices.     ENDOSCOPY, COLON, DIAGNOSTIC  09/10    Colonoscopy while in hospitalized in [facility]- reported to demonstrate colonic polyps    HX CATARACT REMOVAL  2018    HX HERNIA REPAIR      umbilical    HX LUMBAR LAMINECTOMY  1992    HX MALIGNANT SKIN LESION EXCISION  11/1/2011    standard excision to L cheek d/t basal cell carcinoma    HX TONSILLECTOMY       FAMILY HISTORY:  Family History   Problem Relation Age of Onset    Migraines Brother     Malignant Hyperthermia Neg Hx     Pseudocholinesterase Deficiency Neg Hx     Delayed Awakening Neg Hx     Post-op Nausea/Vomiting Neg Hx     Emergence Delirium Neg Hx     Post-op Cognitive Dysfunction Neg Hx     Other Neg Hx      SOCIAL HISTORY:  Social History     Socioeconomic History    Marital status: SINGLE     Spouse name: Not on file    Number of children: Not on file    Years of education: Not on file    Highest education level: Not on file   Tobacco Use    Smoking status: Light Tobacco Smoker     Packs/day: 0.20     Years: 44.00 Pack years: 8.80     Last attempt to quit: 2017     Years since quittin.4    Smokeless tobacco: Never Used   Substance and Sexual Activity    Alcohol use: No     Alcohol/week: 0.0 oz    Drug use: No    Sexual activity: Yes     MEDICATIONS:  Current Facility-Administered Medications   Medication Dose Route Frequency    0.9% NaCl bacteriostatic (NORMAL SALINE) 0.9 % injection        guaiFENesin (ROBITUSSIN) 100 mg/5 mL oral liquid 100 mg  100 mg Oral Q4H PRN    sodium chloride (NS) flush 5-40 mL  5-40 mL IntraVENous Q8H    sodium chloride (NS) flush 5-40 mL  5-40 mL IntraVENous PRN    0.9% sodium chloride infusion  100 mL/hr IntraVENous CONTINUOUS    oxyCODONE-acetaminophen (PERCOCET) 5-325 mg per tablet 1 Tab  1 Tab Oral Q8H PRN    atorvastatin (LIPITOR) tablet 20 mg  20 mg Oral DAILY    acetaminophen (TYLENOL) tablet 650 mg  650 mg Oral Q6H PRN    nicotine (NICODERM CQ) 14 mg/24 hr patch 1 Patch  1 Patch TransDERmal DAILY     Prior to Admission medications    Medication Sig Start Date End Date Taking? Authorizing Provider   trazodone HCl (TRAZODONE, BULK,) 50 mg by Does Not Apply route. Provider, Historical   furosemide (LASIX) 80 mg tablet Take 1 Tab by mouth daily. 18   Elroy Cintron NP   spironolactone (ALDACTONE) 100 mg tablet Take 2 Tabs by mouth daily. 18   Elroy Cintron NP   lactulose (CHRONULAC) 10 gram/15 mL solution Take 20 g by mouth three (3) times daily. Indications: Hepatic Encephalopathy    Provider, Historical       ALLERGIES:  Allergies   Allergen Reactions    Aspirin Other (comments)     Bleeding      Valium [Diazepam] Swelling       Review of Systems  GENERAL: No fevers or chills. HEENT: No change in vision, earache, tinnitus, sore throat or sinus congestion. NECK: No pain or stiffness. CARDIOVASCULAR: No chest pain or pressure. No palpitations. PULMONARY: No shortness of breath, cough or wheeze.     GASTROINTESTINAL: No abdominal pain, nausea, vomiting or diarrhea. GENITOURINARY: No urinary frequency, urgency, hesitancy or dysuria. MUSCULOSKELETAL: No joint or muscle pain, no back pain, no recent trauma. DERMATOLOGIC: No rash, no itching, no lesions. ENDOCRINE: No polyuria, polydipsia, no heat or cold intolerance. No recent change in weight. HEMATOLOGICAL: No anemia or easy bruising or bleeding. NEUROLOGIC: headache No headache, seizures, numbness, tingling or weakness. PHYSICAL EXAMINATION:     Visit Vitals  /87   Pulse 92   Temp 98 °F (36.7 °C)   Resp 18   Ht 5' 11\" (1.803 m)   Wt 102.5 kg (226 lb)   SpO2 96%   BMI 31.52 kg/m²      O2 Device: Room air  GENERAL: Pleasant, in no apparent distress. HEENT: Moist mucous membranes, sclerae anicteric, scalp is atraumatic. CVS: Regular rate and rhythm, no murmurs or gallops. No carotid bruits. PULMONARY: Clear to auscultation bilaterally. No rales or rhonchi. No wheezing. EXTREMITIES: Normal range of motion at all sites. No deformities. ABDOMEN: Soft, nontender. SKIN: No rashes or ecchymoses. Warm and dry. NEUROLOGIC: Alert and oriented x3. Speech is fluent without any aphasia or dysarthria. Repeatedly ask questions. Cranial nerves: Face is symmetric with symmetric smile. Facial sensation is intact. Extraocular movements are intact with no nystagmus. Visual fields are full to confrontation. PERRL. Tongue is midline. Palate elevates symmetrically. Hearing intact to speech. Sternocleidomastoid and trapezius strengths are full bilaterally. Motor: Normal tone and normal bulk on all four extremities. Strength is full on all four segmentally. There is no pronator drift or orbiting. Sensory: Intact to pinprick and touch on all four. Normal vibratory sensation on toes bilaterally. Coordination: Intact coordination with finger-nose-finger bilaterally. Normal fine movements. No bradykinesia detected.   Deep tendon reflexes: 2+ at biceps, brachioradialis, patella and ankles bilaterally. Toes are down-going bilaterally. Gait assessment: Deferred    Labs: Results:       Chemistry Recent Labs     01/30/19  0955   *      K 3.8      CO2 26   BUN 10   CREA 0.87   CA 8.3*   AGAP 8   BUCR 11*   AP 81   TP 7.1   ALB 3.2*   GLOB 3.9   AGRAT 0.8      CBC w/Diff Recent Labs     01/30/19  0955   WBC 5.9   RBC 4.03*   HGB 13.9   HCT 39.1   PLT 57*   GRANS 88*   LYMPH 5*   EOS 1      Cardiac Enzymes Recent Labs     01/31/19  0702 01/31/19  0105    98   CKND1 1.4 1.2      Coagulation Recent Labs     01/30/19  0955   PTP 17.5*   INR 1.5*       Lipid Panel Lab Results   Component Value Date/Time    Cholesterol, total 158 01/31/2019 01:05 AM    HDL Cholesterol 52 01/31/2019 01:05 AM    LDL, calculated 80.4 01/31/2019 01:05 AM    VLDL, calculated 25.6 01/31/2019 01:05 AM    Triglyceride 128 01/31/2019 01:05 AM    CHOL/HDL Ratio 3.0 01/31/2019 01:05 AM      BNP No results for input(s): BNPP in the last 72 hours. Liver Enzymes Recent Labs     01/30/19  0955   TP 7.1   ALB 3.2*   AP 81   SGOT 48*      Thyroid Studies No results found for: T4, T3U, TSH, TSHEXT       Radiology:  Mri Brain Wo Cont    Addendum Date: 1/30/2019    Addendum: This addendum is being made in order to correct a voice to text software transcription error. Impression #1 should state \"Probable recent acute/subacute infarct involving the anterior aspect of the left temporal stem. No evidence of associated hemorrhage or mass effect. \" I apologize for any confusion. Result Date: 1/30/2019    IMPRESSION: 1. Probable recent acute/subacute infarct involving the anterior aspect left renal stent. No evidence of associated hemorrhage or mass effect. 2.  Minimal nonspecific white matter disease likely representing chronic small vessel changes. Ct Head Wo Cont    Result Date: 1/30/2019  . _______________     IMPRESSION: 1.   Punctate left basal ganglia hypodensity, age indeterminate suggest correlation with level and site of clinical symptoms to evaluate significance. 2. Minor white matter findings non-specific, but typically reflecting chronic ischemic change in a patient of this age. Please note that head CT may be negative in the acute setting and MRI could best further evaluate for acute/subacute ischemia/infarct in the high/persistent index of clinical suspicion. Cta Head Neck W Wo Cont    Result Date: 1/31/2019  EXAM:  CT angiogram of the head and neck with intravenous contrast. CLINICAL INDICATION/HISTORY:Blurred vision, headache, difficulty with balance, CVA. COMPARISON: Correlation CT head and brain MRI 1/30/2019 TECHNIQUE:  Following IV administration of nonionic contrast, helical CTA head and neck performed. Three-dimensional, maximum intensity projection, and curved planar reformations were post-processed at a dedicated outside facility ([employer]). Stenoses are reported with reference to the downstream lumen (\"NASCET\"). One or more dose reduction techniques were used on this CT: automated exposure control, adjustment of the mAs and/or kVp according to patient's size, and iterative reconstruction techniques. The specific techniques utilized on this CT exam have been documented in the patient's electronic medical record. Digital Imaging and Communications in Medicine (DICOM) format image data are available to nonaffiliated external healthcare facilities or entities on a secure, media free, reciprocally searchable basis with patient authorization for at least a 12-month period after this study. _______________ FINDINGS: NECK CTA:      ARTERIAL BOLUS QUALITY:  Satisfactory. AORTIC ARCH: Incidental normal variant origin left vertebral artery from aortic arch. No proximal great vessel stenosis. RIGHT CAROTID ARTERY:  No significant common carotid or internal carotid artery stenosis, mild eccentric calcified plaque carotid bifurcation, 0% diameter stenosis proximal ICA by NASCET criteria.       LEFT CAROTID ARTERY:  No significant common carotid or internal carotid artery stenosis. VERTEBRAL ARTERIES: The vertebral arteries areleft side dominant. RIGHT VERTEBRAL ARTERY:  No stenosis or other vascular abnormality. LEFT VERTEBRAL ARTERY:  No stenosis or other vascular abnormality. LUNG APICES: No mass. NECK SOFT TISSUES: No significant neck soft tissue abnormality. OSSEOUS: Degenerative spondylosis, no high-grade canal stenosis. BRAIN CTA:      CAROTID SIPHON AND SUPRACLINOID ICA: Minimal atherosclerotic changes without stenosis or aneurysm. M1 SEGMENT MCA: No significant stenosis or aneurysm. PROXIMAL M2 SEGMENT MCA: No significant stenosis or aneurysm. A1 SEGMENT, ANTERIOR COMMUNICATING ARTERY AND PROXIMAL A2 SEGMENTS:   Patent anterior communicating artery is developmentally small but patent right A1 segment, no significant stenosis or aneurysm. ,      VERTEBRAL BASILAR SYSTEM:Fetal type left-sided PCA with patent right posterior communicating artery with small patent right P1 segment. No significant distal vertebral or basilar stenosis with apparent developmental termination of the distal right vertebral artery in PICA. No significant basilar stenosis. PCA:No significant stenosis or aneurysm. DISTAL ANTERIOR CEREBRAL ARTERY:No significant stenosis or aneurysm. DISTAL MCA M2/M3 SEGMENT:No significant stenosis or aneurysm. DURAL VENOUS SINUSES: Unremarkable. BRAIN PARENCHYMA ON SOURCE DATA: No significant brain parenchymal abnormality. _______________     IMPRESSION: 1. No hemodynamically significant cervical vascular stenosis. 2. Unremarkable brain CTA. ASSESSMENT/IMPRESSION:  58 YO male with PMH of cirrhosis, HCV, thrombocytopenia presents with elevated ammonia level, intermittent headache and double vision. MRI shows Probable recent acute/subacute infarct involving the anterior aspect of the left temporal stem.  No evidence of associated hemorrhage or mass effect. 1. Ischemic stroke: unclear etiology, cerebral venous thrombosis needs to be ruled out given the history of headache and diplopia. 2. Diplopia: intermittent diplopia on all direction of gaze can be caused by hepatic encephalopathy vs episodic migraine. Anatomic location of the ischemic stroke cannot explain the symptoms. 3. Migraine episode:idiopathic, secondary headache such as cerebral venous thrombosis needs to be ruled out        RECOMMENDATIONS:  1. MRV brain for diagnosis. 2. Pending on TTE result. 3. No need to start antiplatelet and anticoagulant due to thrombocytopenia and elevated INR from cirrhosis. 4. Treat cirrhosis per hospitalist team.      I will follow the patient.  Please do not hesitate to return with any questions.    ------------------------------------  Alexa Espana MD  1/31/2019  12:14 PM

## 2019-01-31 NOTE — PROGRESS NOTES
Attempted to see pt for PT session. PT OLENA for testing. Will follow up later as pt schedule allows.

## 2019-01-31 NOTE — PROGRESS NOTES
Reason for Admission:    Leo Espitia is a 59 y.o. male with PMHX of Hepatitis C, Cirrhosis, skin cancer, and esophageal varicies who presents to the emergency department from Dr. Alexandra Prar office (Hepatology) C/O intermittent (5-6 episodes a day) blurry vision described as double vision onset over a week ago lasting 8-15 minutes an episodes. Pt states that there is no pattern he can identify with regards to when episodes occur. He states episodes may occur while he is at rest. Pt notes some alleviation with closing his eyes and being still. Associated sxs include gait disturbance, tinnitus, dizziness, intermittent HA with episodes of blurred vision, tremors, and tingling sensation in tongue. Pt has not been driving. Allergy to ASA and Valium reported. PSHx of EGD, hernia repair, and tonsillectomy. Pt denies abd pain any other sxs or complaints. RRAT Score:      16 Plan for utilizing home health:       
                 
Likelihood of Readmission:   
                      
Transition of Care Plan:      Cm has attempted to meet with patient several times today but due to testing oroff the floor has not been able to. Met with IDR team anticipate d/c per Dr. Cristy Dudley

## 2019-01-31 NOTE — PROGRESS NOTES
Problem: Mobility Impaired (Adult and Pediatric) Goal: *Acute Goals and Plan of Care (Insert Text) Physical Therapy Goals Initiated 1/30/2019 and to be accomplished within 3-5 day(s) 1. Patient will move from supine <> sit with S in prep for out of bed activity and change of position. 2.  Patient will perform sit<> stand with S with LRAD in prep for transfers/ambulation. 3.  Patient will transfer from bed <> chair with S with LRAD for time up in chair for completion of ADL activity. 4.  Patient will ambulate 150 feet with LRAD/S for improved functional mobility/safe discharge. 5.  Patient will ascend/descend 3-5 stairs with handrail with minimal assistance/contact guard assist for home re-entry as needed. Outcome: Progressing Towards Goal 
physical Therapy TREATMENT Patient: Lupis Ledbetter (35 y.o. male) Date: 1/31/2019 Diagnosis: CVA (cerebral vascular accident) [facility]) [facility] Precautions: Fall Chart, physical therapy assessment, plan of care and goals were reviewed. ASSESSMENT: 
Pt continues to present with imbalance during mobility. Pt participated in gait and balance training; side stepping, backward amb, reaching outside ESTUARDO. Pt reports he plans to stay with his sister (?) following d/c which is appropriate as pt lives alone and in not safe to be alone yet. Will continue to progress mobility as pt tolerates. Progression toward goals: 
[]      Improving appropriately and progressing toward goals [x]      Improving slowly and progressing toward goals 
[]      Not making progress toward goals and plan of care will be adjusted PLAN: 
Patient continues to benefit from skilled intervention to address the above impairments. Continue treatment per established plan of care. Discharge Recommendations:  Home Health c/24 hr supervision Further Equipment Recommendations for Discharge:  N/A  
 
SUBJECTIVE:  
Patient stated I am doing ok.  OBJECTIVE DATA SUMMARY:  
Critical Behavior: 
Neurologic State: Alert(sporadic confusion ) Orientation Level: Oriented X4 Cognition: Decreased attention/concentration Safety/Judgement: Awareness of environment, Good awareness of safety precautions Functional Mobility Training: 
Bed Mobility: 
Supine to Sit: Supervision; Additional time; Adaptive equipment Sit to Supine: Supervision Transfers: 
Sit to Stand: Contact guard assistance Stand to Sit: Contact guard assistance Balance: 
Sitting: Intact Standing: Impaired; Without supportAmbulation/Gait Training: 
Distance (ft): 130 Feet (ft) Assistive Device: Gait belt Ambulation - Level of Assistance: Contact guard assistance Gait Abnormalities: Decreased step clearance; Path deviations Base of Support: Widened Speed/Joy: Slow Step Length: Right shortened;Left shortened Pain: 
Pain Scale 1: Numeric (0 - 10) Pain Intensity 1: 4 Pain Location 1: Head 
Pain Description 1: Aching Pain Intervention(s) 1: Medication (see MAR) Activity Tolerance:  
Good Please refer to the flowsheet for vital signs taken during this treatment. After treatment:  
[] Patient left in no apparent distress sitting up in chair 
[x] Patient left in no apparent distress in bed 
[x] Call bell left within reach [x] Nursing notified 
[] Caregiver present 
[] Bed alarm activated Juan A Mayorga Time Calculation: 27 mins

## 2019-01-31 NOTE — PROGRESS NOTES
Problem: Dysphagia (Adult) Goal: *Acute Goals and Plan of Care (Insert Text) Dysphagia Present:   No 
 
Recommendations: 
Diet: Regular/thin Meds: Per patient preference Patient will: 1. Participate in training and education related to continued aspiration risk, diet recs and compensatory strategies, as well as cognitive-linguistic functioning (goal met). Outcome: Resolved/Met Date Met: 01/31/19 Speech LAnguage Pathology bedside swallow evaluation, Speech therapy evaluation AND DISCHARGE Patient: Conrado Ugarte (27 y.o. male) Date: 1/31/2019 Primary Diagnosis: CVA (cerebral vascular accident) ([facility] Precautions:  Fall PLOF: Intact ASSESSMENT : 
Based on the objective data described below, the patient presents with intact swallow function and a mild cognitive impairment c/b short term memory and attention deficits in the setting of CVA. Swallow evaluation: Oral-motor exam revealed structures grossly intact for mastication and deglutition. Observed self-feeding thin liquid +/- straw, puree, solid and meds whole with thin liquid. Pt exhibited adequate bolus cohesion, manipulation and A-P transit. Further exhibited adequate swallow timing/reflex and hyolaryngeal excursion. Able to manipulate and clear with 0 clinical s/s aspiration and/or oropharyngeal dysphagia. Pt safe for regular solid, thin liquid diet. Speech evaluation: Pt reporting difficulty with short term memory onset ~2 weeks ago. Basic expressive and receptive language intact. The patient was assessed using the [facility] with a score of 25/30, indicating a mild cognitive impairment. Deficits include decreased attention and short term memory. Pt stated \"I've had attention problems my whole life\". Safety awareness and problem solving; within normal limits. Patient reports he manages money/medications, ADLs, etc. Independently with no concerns at this time.  Pt encouraged to discuss memory concerns with PCP if difficulties with ADLs are noted by patient or family. SLP educated pt on role of speech therapist in current setting with re: speech/swallow; verbalized comprehension. 0 acute ST needs indicated at this time. SLP available for re-evaluation if indicated by MD. Results d/w RN, Lenny Landin. Thank you for this referral.  
Alejandra Goodman, SLP  
 
PLAN : 
Recommendations and Planned Interventions: 
No formal ST needs ID'd for dysphagia. Eval only. Discharge Recommendations: None SUBJECTIVE:  
Patient stated I don't think I need speech therapy bu I'll talk to my doctor if anything changes. OBJECTIVE:  
 
Past Medical History:  
Diagnosis Date  Cancer ([facility] 11/2011  
 basal cell carcinoma/L cheek  Cirrhosis ([facility] Secondary to chronic HCV  Contact dermatitis and other eczema, due to unspecified cause  Esophageal varices in cirrhosis ([facility]  GERD (gastroesophageal reflux disease) H/O in the past  
 Hep C w/ coma, chronic ([facility] cured from Hep C   
 Hepatitis C Cured in 2-2016  Ill-defined condition  Liver disease  Other ill-defined conditions(799.89)   
 hands and bottom of feet going numb lately  Skin cancer [address] [employer] left cheek 2011  Upper GI bleed Secondary to NSAID induced gastritis, 09/10. Past Surgical History:  
Procedure Laterality Date  EGD  09/10 EGD while hospitalized in [place]-reported to demonstrate severe gastritis from [facility] with small esophageal varices.  ENDOSCOPY, COLON, DIAGNOSTIC  09/10 Colonoscopy while in hospitalized in [facility]- reported to demonstrate colonic polyps  HX CATARACT REMOVAL  2018  HX HERNIA REPAIR    
 umbilical  
 HX LUMBAR LAMINECTOMY  1992  HX MALIGNANT SKIN LESION EXCISION  11/1/2011  
 standard excision to L cheek d/t basal cell carcinoma  HX TONSILLECTOMY Prior Level of Function/Home Situation: Independent Home Situation Home Environment: Other (comment)(town home) # Steps to Enter: 3 Rails to Enter: No 
One/Two Story Residence: Two story # of Interior Steps: 12 Living Alone: Yes Support Systems: Family member(s) Patient Expects to be Discharged to[de-identified] Private residence Current DME Used/Available at Home: None Diet prior to admission: Regular/thin Current Diet:  Regular/thin Cognitive and Communication Status: 
Neurologic State: Alert Orientation Level: Oriented X4 Cognition: Decreased attention/concentration Perception: Appears intact Perseveration: No perseveration noted Safety/Judgement: Awareness of environment, Good awareness of safety precautions Oral Assessment: 
Oral Assessment Labial: No impairment Dentition: Natural;Intact Oral Hygiene: Good Lingual: No impairment Velum: No impairment Mandible: No impairment P.O. Trials: 
Patient Position: QNP 60 Vocal quality prior to P.O.: No impairment Consistency Presented: Thin liquid;Puree; Solid;Pill/Tablet How Presented: Self-fed/presented;Straw;Successive swallows Bolus Acceptance: No impairment Bolus Formation/Control: No impairment Propulsion: No impairment Oral Residue: None Initiation of Swallow: No impairment Laryngeal Elevation: Functional 
Aspiration Signs/Symptoms: None Pharyngeal Phase Characteristics: No impairment, issues, or problems Effective Modifications: Small sips and bites Cues for Modifications: None Oral Phase Severity: No impairment Pharyngeal Phase Severity : No impairment The severity rating is based on the following outcomes: REGAN Noms Swallow Level 7 Clinical Judgement PAIN: 
Start of Eval: 0 End of Eval: 0 After evaluation:  
[]            Patient left in no apparent distress sitting up in chair 
[x]            Patient left in no apparent distress in bed 
[x]            Call bell left within reach [x]            Nursing notified []            Family present 
[]            Caregiver present 
[]            Bed alarm activated COMMUNICATION/EDUCATION:  
[x]            Aspiration precautions; swallow safety; compensatory techniques. [x]            Patient/family have participated as able in goal setting and plan of care. [x]            Patient/family agree to work toward stated goals and plan of care. []            Patient understands intent and goals of therapy; neutral about participation. []            Patient unable to participate in goal setting/plan of care; educ ongoing with interdisciplinary staff 
[]         Posted safety precautions in patient's room. Thank you for this referral. 
LENA Montgomery Time Calculation: 50 mins

## 2019-01-31 NOTE — PROGRESS NOTES
Bedside shift change report given to Jazmyne Saez RN (oncoming nurse) by Hollie Aschoff RN (offgoing nurse). Report included the following information SBAR, Kardex, ED Summary, Intake/Output, MAR, Accordion, Recent Results and Alarm Parameters .

## 2019-01-31 NOTE — ROUTINE PROCESS
Bedside and Verbal shift change report given to MAGDY Dennis RN (oncoming nurse) by VANESSA Stahl RN (offgoing nurse). Report included the following information SBAR, Kardex, Intake/Output, MAR and Recent Results.

## 2019-01-31 NOTE — PROGRESS NOTES
Problem: Falls - Risk of 
Goal: *Absence of Falls Document Lynn Patino Fall Risk and appropriate interventions in the flowsheet. Outcome: Progressing Towards Goal 
Fall Risk Interventions: 
  
 
  
 
Medication Interventions: Evaluate medications/consider consulting pharmacy, Patient to call before getting OOB, Teach patient to arise slowly

## 2019-02-01 ENCOUNTER — APPOINTMENT (OUTPATIENT)
Dept: VASCULAR SURGERY | Age: 64
DRG: 066 | End: 2019-02-01
Attending: HOSPITALIST
Payer: MEDICARE

## 2019-02-01 PROBLEM — K76.82 HEPATIC ENCEPHALOPATHY: Status: ACTIVE | Noted: 2019-02-01

## 2019-02-01 LAB — AMMONIA PLAS-SCNC: 104 UMOL/L (ref 11–32)

## 2019-02-01 PROCEDURE — 97116 GAIT TRAINING THERAPY: CPT

## 2019-02-01 PROCEDURE — 65660000000 HC RM CCU STEPDOWN

## 2019-02-01 PROCEDURE — 74011250636 HC RX REV CODE- 250/636: Performed by: HOSPITALIST

## 2019-02-01 PROCEDURE — 36415 COLL VENOUS BLD VENIPUNCTURE: CPT

## 2019-02-01 PROCEDURE — 74011250637 HC RX REV CODE- 250/637: Performed by: HOSPITALIST

## 2019-02-01 PROCEDURE — 74011250637 HC RX REV CODE- 250/637: Performed by: INTERNAL MEDICINE

## 2019-02-01 PROCEDURE — 82140 ASSAY OF AMMONIA: CPT

## 2019-02-01 PROCEDURE — 93970 EXTREMITY STUDY: CPT

## 2019-02-01 RX ORDER — TRAZODONE HYDROCHLORIDE 50 MG/1
50 TABLET ORAL
Status: DISCONTINUED | OUTPATIENT
Start: 2019-02-01 | End: 2019-02-03 | Stop reason: HOSPADM

## 2019-02-01 RX ORDER — ATORVASTATIN CALCIUM 10 MG/1
10 TABLET, FILM COATED ORAL DAILY
Status: DISCONTINUED | OUTPATIENT
Start: 2019-02-02 | End: 2019-02-03 | Stop reason: HOSPADM

## 2019-02-01 RX ORDER — FUROSEMIDE 40 MG/1
80 TABLET ORAL DAILY
Status: DISCONTINUED | OUTPATIENT
Start: 2019-02-02 | End: 2019-02-03 | Stop reason: HOSPADM

## 2019-02-01 RX ORDER — TRAZODONE HYDROCHLORIDE 50 MG/1
50 TABLET ORAL
COMMUNITY
End: 2019-05-01

## 2019-02-01 RX ORDER — SPIRONOLACTONE 100 MG/1
200 TABLET, FILM COATED ORAL DAILY
Status: DISCONTINUED | OUTPATIENT
Start: 2019-02-02 | End: 2019-02-03 | Stop reason: HOSPADM

## 2019-02-01 RX ADMIN — ATORVASTATIN CALCIUM 20 MG: 20 TABLET, FILM COATED ORAL at 08:13

## 2019-02-01 RX ADMIN — GUAIFENESIN 100 MG: 200 SOLUTION ORAL at 16:14

## 2019-02-01 RX ADMIN — LACTULOSE 20 G: 20 SOLUTION ORAL at 11:28

## 2019-02-01 RX ADMIN — OXYCODONE AND ACETAMINOPHEN 1 TABLET: 5; 325 TABLET ORAL at 06:56

## 2019-02-01 RX ADMIN — LACTULOSE 20 G: 20 SOLUTION ORAL at 16:14

## 2019-02-01 RX ADMIN — SODIUM CHLORIDE 100 ML/HR: 900 INJECTION, SOLUTION INTRAVENOUS at 20:59

## 2019-02-01 RX ADMIN — OXYCODONE AND ACETAMINOPHEN 1 TABLET: 5; 325 TABLET ORAL at 22:59

## 2019-02-01 RX ADMIN — ACETAMINOPHEN 650 MG: 325 TABLET ORAL at 16:14

## 2019-02-01 RX ADMIN — Medication 1 SPRAY: at 08:30

## 2019-02-01 RX ADMIN — ACETAMINOPHEN 650 MG: 325 TABLET ORAL at 08:13

## 2019-02-01 RX ADMIN — TRAZODONE HYDROCHLORIDE 50 MG: 50 TABLET ORAL at 20:58

## 2019-02-01 RX ADMIN — LACTULOSE 20 G: 20 SOLUTION ORAL at 20:59

## 2019-02-01 RX ADMIN — Medication 10 ML: at 23:01

## 2019-02-01 RX ADMIN — OXYCODONE AND ACETAMINOPHEN 1 TABLET: 5; 325 TABLET ORAL at 15:05

## 2019-02-01 RX ADMIN — Medication 1 SPRAY: at 01:30

## 2019-02-01 RX ADMIN — Medication 10 ML: at 06:59

## 2019-02-01 RX ADMIN — Medication 10 ML: at 15:05

## 2019-02-01 NOTE — PROGRESS NOTES
Hospitalist Progress Note-critical care note Patient: Leah Devine MRN: [id number]  CSN: [id number] YOB: 1955  Age: 59 y.o. Sex: male DOA: 1/30/2019 LOS:  LOS: 2 days Chief complaint: cva, cirrhosis , elevated inr ,  
 
Assessment/Plan Hospital Problems  Date Reviewed: 1/30/2019 Codes Class Noted POA [facility] ICD-10-CM: K72.90 ICD-9-CM: 572.2  2/1/2019 Unknown * (Principal) CVA (cerebral vascular accident) ([facility] [address] ICD-10-CM: I63.9 ICD-9-CM: 434.91  1/30/2019 Unknown Elevated INR ICD-10-CM: R79.1 ICD-9-CM: 790.92  1/30/2019 Unknown Back pain ICD-10-CM: M54.9 ICD-9-CM: 724.5  1/30/2019 Unknown Chest pressure ICD-10-CM: R07.89 ICD-9-CM: 786.59  1/30/2019 Unknown Cirrhosis ([facility] ICD-10-CM: K74.60 ICD-9-CM: 571.5  9/5/2011 Yes Thrombocytopenia secondary to cirrhosis ICD-10-CM: D69.6 ICD-9-CM: 287.5  9/5/2011 Yes Stroke Katie Miners Stable Continue neuro check, mri brain done -acute/subacute infarct anterior aspect of the left temporal stem Allergic to aspirin and plavix was hold due to elevated inr and thrombocytopenia Will have pvl to r/o dvt due to possible pfo per tte Case discussed with Dr. Jennifer Fowler for sesar  
cta neck/head No hemodynamically significant cervical vascular stenosis 
mrv no venous thrombosis Hepatic encephalopathy Over 100 today, not having bm yesterday Will give lactulose aggressively Continue ammonia level check -discussed with NEDRA Hooper Will had bedside commode  
  
Elevated inr Due to cirrhosis , will continue check  
  
Cirrhosis F/u with dr. Nava Salmeron,  
Continue home medication Will consult dr. Nava Salmeron  
  Thrombocytopenia Continue monitor platelet, stable  
  
Chest pressure Resolved. ce wnl, ekg good Continue ce trend Echo done,still pending Subjective : blurred vision , headache, not sleeping well last night Disposition :tbd Brother was at the bedside. All questions have been answered. 35 total min's spent on patient care including >50% on counseling/coordinating care. Discussed the above assessments. also discussed labs, medications and hospital course Review of systems: 
 
General: No fevers or chills. Cardiovascular: No chest pain or pressure. No palpitations. Pulmonary: No shortness of breath. Gastrointestinal: No nausea, vomiting. Neuro: double vision Vital signs/Intake and Output: 
Visit Vitals /61 Pulse 81 Temp 97.6 °F (36.4 °C) Resp 19 Ht 5' 11\" (1.803 m) Wt 106.7 kg (235 lb 4.8 oz) SpO2 99% BMI 32.82 kg/m² Current Shift:  02/01 0701 - 02/01 1900 In: -  
Out: 073 [ZTCUO:259] Last three shifts:  01/30 1901 - 02/01 0700 In: 1741.7 [P.O.:1540; I.V.:201.7] Out: 2540 [Urine:2440] Physical Exam: 
General: WD, WN. Alert, cooperative, no acute distress   
HEENT: NC, Atraumatic. PERRLA, anicteric sclerae. Lungs: CTA Bilaterally. No Wheezing/Rhonchi/Rales. Heart:  Regular  rhythm,  No murmur, No Rubs, No Gallops Abdomen: Soft, Non distended, Non tender.  +Bowel sounds, Extremities: No c/c/e Psych:   Not anxious or agitated. Neurologic:  No acute neurological deficit, but some confusion Labs: Results:  
   
Chemistry Recent Labs  
  01/30/19 
7321 *   
K 3.8  CO2 26 BUN 10  
CREA 0.87 CA 8.3* AGAP 8  
BUCR 11* AP 81  
TP 7.1 ALB 3.2*  
GLOB 3.9 AGRAT 0.8 CBC w/Diff Recent Labs  
  01/30/19 
0955 WBC 5.9  
RBC 4.03* HGB 13.9 HCT 39.1 PLT 57* GRANS 88* LYMPH 5* EOS 1 Cardiac Enzymes Recent Labs  
  01/31/19 
0702 01/31/19 
0105  98 CKND1 1.4 1.2 Coagulation Recent Labs  
  01/30/19 
0955 PTP 17.5* INR 1.5* Lipid Panel Lab Results Component Value Date/Time  Cholesterol, total 158 01/31/2019 01:05 AM  
 HDL Cholesterol 52 01/31/2019 01:05 AM  
 LDL, calculated 80.4 01/31/2019 01:05 AM  
 VLDL, calculated 25.6 01/31/2019 01:05 AM  
 Triglyceride 128 01/31/2019 01:05 AM  
 CHOL/HDL Ratio 3.0 01/31/2019 01:05 AM  
  
BNP No results for input(s): BNPP in the last 72 hours. Liver Enzymes Recent Labs  
  01/30/19 
0955 TP 7.1 ALB 3.2* AP 81 SGOT 48* Thyroid Studies No results found for: T4, T3U, TSH, TSHEXT, TSHEXT Procedures/imaging: see electronic medical records for all procedures/Xrays and details which were not copied into this note but were reviewed prior to creation of Plan Tyrone Ortiz MD

## 2019-02-01 NOTE — PROGRESS NOTES
Verbal bedside received from Day A. RN off going nurse. Assumed patient care, bed in low position, call light within reach, white board updated. Patient assessment completed. Complained of headache. Tylenol administered. Patient insisted that tylenol helps him better. 0000 Complained of dry nasal passage and throat. Offered patient ice and paged Dr. Amanda Hay for nasal spray. Order received and nasal spray administered. 0600 Patient had uneventful night.  NAD

## 2019-02-01 NOTE — PROGRESS NOTES
0730 Assumed care of the patient from 1280 Guillermo Colon (offgoing Nurse). Patient is alert and oriented. Bedside NIH done. Score 0. Pt complain of headache. He received percocet at 7. Tylenol due at 8. Bed in low position, call bell within reach. 0800 Adminsitered tylenol as ordered. 1255 Pt back to the floor. 1920 Bedside and Verbal shift change report given to Jeremi Jhaveri RN (oncoming nurse) by Sherry Sales RN (offgoing nurse). Report included the following information SBAR, Kardex, MAR, Recent Results and Cardiac Rhythm .

## 2019-02-01 NOTE — PROGRESS NOTES
Problem: Mobility Impaired (Adult and Pediatric) Goal: *Acute Goals and Plan of Care (Insert Text) Physical Therapy Goals Initiated 1/30/2019 and to be accomplished within 3-5 day(s) 1. Patient will move from supine <> sit with S in prep for out of bed activity and change of position. 2.  Patient will perform sit<> stand with S with LRAD in prep for transfers/ambulation. 3.  Patient will transfer from bed <> chair with S with LRAD for time up in chair for completion of ADL activity. 4.  Patient will ambulate 150 feet with LRAD/S for improved functional mobility/safe discharge. 5.  Patient will ascend/descend 3-5 stairs with handrail with minimal assistance/contact guard assist for home re-entry as needed. Outcome: Progressing Towards Goal 
physical Therapy TREATMENT Patient: Mohan Victoria (33 y.o. male) Date: 2/1/2019 Diagnosis: CVA (cerebral vascular accident) [facility]) CVA (cerebral vascular accident) ([facility] [address] Precautions: Fall Chart, physical therapy assessment, plan of care and goals were reviewed. ASSESSMENT: 
Pt reports increased Headach ( Mostly on L side), requiring encouragement to participate in session. Pt ambulates with extremely short step stride, noting slight visual abnormality. Will attempt stair amb on a future treatment. Pt should be able to rehab on an outpatient basis. Progression toward goals: 
[]      Improving appropriately and progressing toward goals [x]      Improving slowly and progressing toward goals 
[]      Not making progress toward goals and plan of care will be adjusted PLAN: 
Patient continues to benefit from skilled intervention to address the above impairments. Continue treatment per established plan of care. Discharge Recommendations:  Home Health or TBD Further Equipment Recommendations for Discharge:  N/A  
 
SUBJECTIVE:  
Patient stated I have a headache and I don't feel good.  OBJECTIVE DATA SUMMARY:  
Critical Behavior: Neurologic State: Alert Orientation Level: Oriented X4 Cognition: Appropriate decision making, Appropriate for age attention/concentration, Appropriate safety awareness, Follows commands Safety/Judgement: Awareness of environment, Good awareness of safety precautions Functional Mobility Training: 
Bed Mobility: 
Rolling: Supervision Supine to Sit: Supervision Sit to Supine: Supervision Transfers: 
Sit to Stand: Supervision;Stand-by assistance Stand to Sit: Supervision;Stand-by assistance Balance: 
Sitting: Intact Standing: Impaired; With support Standing - Static: Good Standing - Dynamic : FairAmbulation/Gait Training: 
Distance (ft): 300 Feet (ft) Assistive Device: Gait belt Ambulation - Level of Assistance: Contact guard assistance Gait Abnormalities: Decreased step clearance; Path deviations Base of Support: Widened Speed/Joy: Slow Step Length: Right shortened;Left shortened Pain: 
Pain Scale 1: Numeric (0 - 10) Pain Intensity 1: 0 Pain Location 1: Head 
Pain Orientation 1: Anterior Pain Description 1: Aching Pain Intervention(s) 1: Medication (see MAR) Activity Tolerance:  
Fair Please refer to the flowsheet for vital signs taken during this treatment. After treatment:  
[x] Patient left in no apparent distress sitting up in chair 
[] Patient left in no apparent distress in bed 
[x] Call bell left within reach [x] Nursing notified 
[x] Caregiver present 
[] Bed alarm activated Elan Golden PTA Time Calculation: 23 mins

## 2019-02-01 NOTE — PROGRESS NOTES
Bedside and Verbal shift change report given to Leo Landaverde RN (oncoming nurse) by Justo Ram (offgoing nurse). Report included the following information SBAR, Kardex and MAR.

## 2019-02-01 NOTE — PROGRESS NOTES
Bedside and Verbal shift change report given to NEDRA Zuluaga (oncoming nurse) by A Day RN (offgoing nurse). Report included the following information SBAR, Kardex, ED Summary, Procedure Summary, Intake/Output, MAR, Accordion, Recent Results and Med Rec Status.

## 2019-02-01 NOTE — PROGRESS NOTES
Transition of care: patient reports when he is ready for d/c plan is to go live with daughter Met with patient at bedside and his brother at bedside. Patient states he lives alone he would like to have palliative care to address his poa. Patient states he has been indendent states he uses a cane or walker. Has medicare for insurance but would like a local md with in [facility]. Cms made aware. He may need C cm will continue to follow Care Management Interventions PCP Verified by CM: Yes Transition of Care Consult (CM Consult): Discharge Planning

## 2019-02-01 NOTE — PROGRESS NOTES
Problem: Falls - Risk of 
Goal: *Absence of Falls Document Sari Primes Fall Risk and appropriate interventions in the flowsheet. Outcome: Progressing Towards Goal 
Fall Risk Interventions: 
Mobility Interventions: PT Consult for mobility concerns, PT Consult for assist device competence Medication Interventions: Patient to call before getting OOB, Teach patient to arise slowly, Utilize gait belt for transfers/ambulation

## 2019-02-01 NOTE — PROGRESS NOTES
· No evidence of deep vein thrombosis in the right lower extremity. · No evidence of deep vein thrombosis in the left lower extremity.

## 2019-02-01 NOTE — PROGRESS NOTES
Ana Paula 18 care of pt from [address] [place] [address]. Pt resting quietly in bed , no signs of distress, call bell within reach. Shift Summary- Shift uneventful. Pt rested throughout shift. Denied chest pain or shortness of breath. Able to ambulate with stand by assistance . Ate approximately 100% of all meals.

## 2019-02-01 NOTE — PROGRESS NOTES
Problem: Falls - Risk of 
Goal: *Absence of Falls Document Emperatriz Eaton Fall Risk and appropriate interventions in the flowsheet. Outcome: Progressing Towards Goal 
Fall Risk Interventions: 
Mobility Interventions: Assess mobility with egress test, Bed/chair exit alarm, PT Consult for mobility concerns, PT Consult for assist device competence Medication Interventions: Assess postural VS orthostatic hypotension, Bed/chair exit alarm, Teach patient to arise slowly, Patient to call before getting OOB

## 2019-02-02 LAB — AMMONIA PLAS-SCNC: 45 UMOL/L (ref 11–32)

## 2019-02-02 PROCEDURE — 74011250637 HC RX REV CODE- 250/637: Performed by: PHYSICIAN ASSISTANT

## 2019-02-02 PROCEDURE — 97116 GAIT TRAINING THERAPY: CPT

## 2019-02-02 PROCEDURE — 82140 ASSAY OF AMMONIA: CPT

## 2019-02-02 PROCEDURE — 74011250637 HC RX REV CODE- 250/637: Performed by: INTERNAL MEDICINE

## 2019-02-02 PROCEDURE — 36415 COLL VENOUS BLD VENIPUNCTURE: CPT

## 2019-02-02 PROCEDURE — 65660000000 HC RM CCU STEPDOWN

## 2019-02-02 PROCEDURE — 74011250637 HC RX REV CODE- 250/637: Performed by: HOSPITALIST

## 2019-02-02 RX ORDER — RANITIDINE 150 MG/1
150 TABLET, FILM COATED ORAL DAILY
Status: DISCONTINUED | OUTPATIENT
Start: 2019-02-02 | End: 2019-02-03 | Stop reason: HOSPADM

## 2019-02-02 RX ORDER — IBUPROFEN 200 MG
1 TABLET ORAL DAILY
Status: DISCONTINUED | OUTPATIENT
Start: 2019-02-02 | End: 2019-02-03 | Stop reason: HOSPADM

## 2019-02-02 RX ADMIN — ATORVASTATIN CALCIUM 10 MG: 10 TABLET, FILM COATED ORAL at 08:15

## 2019-02-02 RX ADMIN — LACTULOSE 20 G: 20 SOLUTION ORAL at 21:50

## 2019-02-02 RX ADMIN — FUROSEMIDE 80 MG: 40 TABLET ORAL at 08:14

## 2019-02-02 RX ADMIN — SPIRONOLACTONE 200 MG: 100 TABLET, FILM COATED ORAL at 08:15

## 2019-02-02 RX ADMIN — LACTULOSE 20 G: 20 SOLUTION ORAL at 00:22

## 2019-02-02 RX ADMIN — ACETAMINOPHEN 650 MG: 325 TABLET ORAL at 00:22

## 2019-02-02 RX ADMIN — OXYCODONE AND ACETAMINOPHEN 1 TABLET: 5; 325 TABLET ORAL at 18:00

## 2019-02-02 RX ADMIN — TRAZODONE HYDROCHLORIDE 50 MG: 50 TABLET ORAL at 21:50

## 2019-02-02 RX ADMIN — ACETAMINOPHEN 650 MG: 325 TABLET ORAL at 19:16

## 2019-02-02 RX ADMIN — RANITIDINE 150 MG: 150 TABLET ORAL at 16:58

## 2019-02-02 RX ADMIN — ACETAMINOPHEN 650 MG: 325 TABLET ORAL at 09:37

## 2019-02-02 RX ADMIN — OXYCODONE AND ACETAMINOPHEN 1 TABLET: 5; 325 TABLET ORAL at 08:14

## 2019-02-02 RX ADMIN — Medication 10 ML: at 21:51

## 2019-02-02 NOTE — PROGRESS NOTES
Hospitalist Progress Note Patient: Susan Doll MRN: [id number]  CSN: [id number] YOB: 1955  Age: 59 y.o. Sex: male DOA: 1/30/2019 LOS:  LOS: 3 days Chief Complaint: acute CVA Assessment/Plan Patient Active Problem List  
Diagnosis Code  Chronic hepatitis C genotype 1 B18.2  Cirrhosis ([facility] K74.60  Upper GI bleed secondary to NSAID induced gastritis - 9/2010 K92.2  
 S/P lumbar laminectomy with spinal fusion - 1992 Z98.1  Colon polyps K63.5  Esophageal varices (HCC) I85.00  Thrombocytopenia secondary to cirrhosis D69.6  Portal hypertensive gastropathy (HCC) K76.6, K31.89  
 Cellulitis L03.90  CVA (cerebral vascular accident) ([facility] I63.9  Elevated INR R79.1  Back pain M54.9  Chest pressure R07.89  
 Hepatic encephalopathy (HCC) K72.90 Acute/subacute left temporal infarct: 
Continue neurologic checks per neurology. MRI with new subacute/acute infarct of the inferior anterior aspect of the left temporal stem. On Plavix secondary to aspirin allergy held secondary to elevated INR and thrombocytopenia. Bilateral PVL to rule out DVT. TTE it is suggestive of PFO. Patient now requires a TRINY. Hepatic encephalopathy: 
Patient's ammonia improved today from 104 down to 45. Patient denies excessive diarrhea and reports approximately 3 stools daily. Continue current aggressive management. Follow ammonia levels. Coagulopathy: 
This is secondary to cirrhosis; monitor. Chest pressure: Resolved Subjective: \"I don't usually get confused until be NH4 get up higher that about 120. My thinking is much more clear this AM.\" Review of systems: 
 
Constitutional: denies fevers, chills, myalgias Respiratory: denies SOB, cough Cardiovascular: denies chest pain, palpitations Gastrointestinal: denies nausea, vomiting, diarrhea Vital signs/Intake and Output: 
Visit Vitals /55 (BP 1 Location: Right arm, BP Patient Position: At rest) Pulse 77 Temp 98.1 °F (36.7 °C) Resp 16 Ht 5' 11\" (1.803 m) Wt 106.7 kg (235 lb 4.8 oz) SpO2 94% BMI 32.82 kg/m² Current Shift:  02/02 0701 - 02/02 1900 In: 0 Out: 200 [Urine:200] Last three shifts:  01/31 1901 - 02/02 0700 In: 480 [P.O.:480] Out: 665 [Urine:565] Exam: 
 
General: Well developed, alert, NAD, OX3 Head/Neck: NCAT, supple, No masses, No lymphadenopathy CVS:Regular rate and rhythm, no M/R/G, S1/S2 heard, no thrill Lungs:Clear to auscultation bilaterally, no wheezes, rhonchi, or rales Abdomen: Soft, Nontender, Pertuberant, Normal Bowel sounds, No hepatomegaly Extremities: No C/C/E, pulses palpable 2+ Skin:normal texture and turgor, no rashes, no lesions Neuro:grossly normal , follows commands Psych:appropriate Labs: Results:  
   
Chemistry Recent Labs  
  01/30/19 
0463 *   
K 3.8  CO2 26 BUN 10  
CREA 0.87 CA 8.3* AGAP 8  
BUCR 11* AP 81  
TP 7.1 ALB 3.2*  
GLOB 3.9 AGRAT 0.8 CBC w/Diff Recent Labs  
  01/30/19 
0955 WBC 5.9  
RBC 4.03* HGB 13.9 HCT 39.1 PLT 57* GRANS 88* LYMPH 5* EOS 1 Cardiac Enzymes Recent Labs  
  01/31/19 
0702 01/31/19 
0105  98 CKND1 1.4 1.2 Coagulation Recent Labs  
  01/30/19 
0955 PTP 17.5* INR 1.5* Lipid Panel Lab Results Component Value Date/Time Cholesterol, total 158 01/31/2019 01:05 AM  
 HDL Cholesterol 52 01/31/2019 01:05 AM  
 LDL, calculated 80.4 01/31/2019 01:05 AM  
 VLDL, calculated 25.6 01/31/2019 01:05 AM  
 Triglyceride 128 01/31/2019 01:05 AM  
 CHOL/HDL Ratio 3.0 01/31/2019 01:05 AM  
  
BNP No results for input(s): BNPP in the last 72 hours. Liver Enzymes Recent Labs  
  01/30/19 
0955 TP 7.1 ALB 3.2* AP 81 SGOT 48* Thyroid Studies No results found for: T4, T3U, TSH, TSHEXT Procedures/imaging: see electronic medical records for all procedures/Xrays and details which were not copied into this note but were reviewed prior to creation of Plan Kathe Cabrera MD

## 2019-02-02 NOTE — PROGRESS NOTES
Bedside shift change report given to [facility] (oncoming nurse) by Arabella Tran RN (offgoing nurse). Report included the following information SBAR, Kardex, ED Summary, Intake/Output, MAR, Accordion, Recent Results and Alarm Parameters . 9992 Shift assessment complete. NIH of 0. No complaints of chest pain or shortness of breath. Call light is within reach. 1500 Dr. Neita Nissen aware that patient is refusing lactulose. 1640 Pt was having some reflux. Kulwant HUSSEIN aware. Shift Summary: Shift uneventful. No complaints of chest pain or shortness of breath. Call light is within reach.

## 2019-02-02 NOTE — PROGRESS NOTES
Problem: Mobility Impaired (Adult and Pediatric) Goal: *Acute Goals and Plan of Care (Insert Text) Physical Therapy Goals Initiated 1/30/2019 and to be accomplished within 3-5 day(s) 1. Patient will move from supine <> sit with S in prep for out of bed activity and change of position. 2.  Patient will perform sit<> stand with S with LRAD in prep for transfers/ambulation. 3.  Patient will transfer from bed <> chair with S with LRAD for time up in chair for completion of ADL activity. 4.  Patient will ambulate 150 feet with LRAD/S for improved functional mobility/safe discharge. 5.  Patient will ascend/descend 3-5 stairs with handrail with minimal assistance/contact guard assist for home re-entry as needed. Outcome: Resolved/Met Date Met: 02/02/19 
physical Therapy TREATMENT/DISCHARGE Patient: Chapin Bustillo (45 y.o. male) Date: 2/2/2019 Diagnosis: CVA (cerebral vascular accident) [facility] Precautions: Fall Chart, physical therapy assessment, plan of care and goals were reviewed. ASSESSMENT: 
Pt meets needs for safe home mobility, expresses understanding of HEP and is cleared from this level of PT. Progression toward goals: 
[x]      Goals met 
[]      Improving appropriately and progressing toward goals 
[]      Improving slowly and progressing toward goals 
[]      Not making progress toward goals and plan of care will be adjusted PLAN: 
Patient will be discharged from physical therapy at this time. Rationale for discharge: 
[x] Goals Achieved 
[] [address] 
[] Patient not participating in therapy 
[] Other: 
Discharge Recommendations:  Outpatient Further Equipment Recommendations for Discharge:  N/A  
 
SUBJECTIVE:  
Patient stated I could do this yesterday, but I wanted to keep my feet close.  OBJECTIVE DATA SUMMARY:  
Critical Behavior: 
Neurologic State: Alert Orientation Level: Oriented X4 
 Cognition: Appropriate decision making, Appropriate for age attention/concentration, Appropriate safety awareness, Follows commands Safety/Judgement: Awareness of environment, Good awareness of safety precautions Functional Mobility Training: 
Bed Mobility: 
Rolling: Modified independent Supine to Sit: Modified independent Sit to Supine: Modified independent Transfers: 
Sit to Stand: Modified independent Stand to Sit: Modified independent Balance: 
Sitting: Intact Standing: Intact; With support Standing - Static: Good Standing - Dynamic : FairAmbulation/Gait Training: 
Distance (ft): 600 Feet (ft) Assistive Device: Gait belt Ambulation - Level of Assistance: Contact guard assistance Gait Abnormalities: Decreased step clearance; Path deviations Base of Support: Widened Speed/Joy: Slow Step Length: Right shortened;Left shortened Stairs: 
Number of Stairs Trained: 12 
Stairs - Level of Assistance: Supervision Rail Use: Both 
Pain: 
Pain Scale 1: Numeric (0 - 10) Pain Intensity 1: 1 Pain out: 4 Pain Location 1: Head 
Pain Orientation 1: Anterior Pain Description 1: Aching Pain Intervention(s) 1: Medication (see MAR) Activity Tolerance:  
Fair+ Please refer to the flowsheet for vital signs taken during this treatment. After treatment:  
[x] Patient left in no apparent distress sitting EOB [] Patient left in no apparent distress in bed 
[x] Call bell left within reach [x] Nursing notified 
[] Caregiver present 
[] Bed alarm activated Cierra Lomeli PTA Time Calculation: 35 mins

## 2019-02-02 NOTE — PROGRESS NOTES
NEUROLOGY PROGRESS NOTE Patient: Tracee Cardenas        Sex: male          DOA: 1/30/2019 YOB: 1955      Age:  59 y.o.         LOS: 3 days Identification: 
58 YO male presents with intermittent brief diplopia and small stroke. SUBJECTIVE:  
Patient is stable today, had 2-3 episodes of brief diplopia, headache and lightheadedness last night. Ammonia level was elevated. Stroke Work-up: 
Brain MRI:  
 
Addendum Date: 1/30/2019 Addendum: This addendum is being made in order to correct a voice to text software transcription error. Impression #1 should state \"Probable recent acute/subacute infarct involving the anterior aspect of the left temporal stem. No evidence of associated hemorrhage or mass effect. \" I apologize for any confusion. Result Date: 1/30/2019 EXAM: MRI BRAIN W/O CONTRAST INDICATION: diplopia, possible CVA COMPARISON: No   
 
IMPRESSION: 1. Probable recent acute/subacute infarct involving the anterior aspect left renal stent. No evidence of associated hemorrhage or mass effect. 2.  Minimal nonspecific white matter disease likely representing chronic small vessel changes. Ct Head Wo Cont Result Date: 1/30/2019 IMPRESSION: 1. Punctate left basal ganglia hypodensity, age indeterminate suggest correlation with level and site of clinical symptoms to evaluate significance. 2. Minor white matter findings non-specific, but typically reflecting chronic ischemic change in a patient of this age. Please note that head CT may be negative in the acute setting and MRI could best further evaluate for acute/subacute ischemia/infarct in the high/persistent index of clinical suspicion. Cta Head Neck W Wo Cont Result Date: 1/31/2019 IMPRESSION: 1. No hemodynamically significant cervical vascular stenosis. 2. Unremarkable brain CTA. Mrv Brain W Wo Cont Result Date: 1/31/2019 IMPRESSION: 1. No evidence of dural venous sinus thrombosis.  2. Dominant arachnoid granulations causing filling defects within the bilateral distal transverse dural venous sinuses with some associated narrowing of the sinuses at these sites. Echocardiogram: · Estimated left ventricular ejection fraction is 61 - 65%. Mild (grade 1) left ventricular diastolic dysfunction E/E' ratio is 9. Bud Díaz · Trace mitral valve regurgitation. · Saline bubble study is positive in 5 cardiac cycle, possible PFO- consider TRINY for further evaluation. Lipid panel:  
Lab Results Component Value Date/Time Cholesterol, total 158 01/31/2019 01:05 AM  
 HDL Cholesterol 52 01/31/2019 01:05 AM  
 LDL, calculated 80.4 01/31/2019 01:05 AM  
 VLDL, calculated 25.6 01/31/2019 01:05 AM  
 Triglyceride 128 01/31/2019 01:05 AM  
 CHOL/HDL Ratio 3.0 01/31/2019 01:05 AM  
 
HbA1c:  
Lab Results Component Value Date/Time Hemoglobin A1c 4.5 01/31/2019 01:05 AM  
 
 
 
REVIEW OF SYSTEMS: Denies chest pain, abdominal pain, nausea or vomiting. No fever or chills. OBJECTIVE:  
  
Visit Vitals /55 (BP 1 Location: Right arm, BP Patient Position: At rest) Pulse 77 Temp 98.1 °F (36.7 °C) Resp 16 Ht 5' 11\" (1.803 m) Wt 106.7 kg (235 lb 4.8 oz) SpO2 94% BMI 32.82 kg/m² Physical Exam: 
GEN: Alert, NAD 
EYES: conjunctiva normal, lids with out lesions HEENT: MMM. HEART: RRR +S1 +S2 LUNGS: CTA B/L no rales or rhonchi. ABDOMEN: Soft, non-tender. EXTREMITIES: No edema cyanosis SKIN: no rashes or skin breakdown, no nodules NEURO: Alert, oriented x3. Speech is fluent. Cranials: Face is symmetric. Smiles symmetrically. EOMI, VFF. Tongue is midline. Motor: Full strength at all sites. No pronator drift. Sensory: Intact to crude touch on all four. Coordination: Intact with no dysmetria during FNF. Current Facility-Administered Medications Medication Dose Route Frequency  sodium chloride (OCEAN) 0.65 % nasal squeeze bottle 1 Spray  1 Spray Both Nostrils PRN  
  atorvastatin (LIPITOR) tablet 10 mg  10 mg Oral DAILY  lactulose (CHRONULAC) solution 20 g  30 mL Oral Q4H  
 furosemide (LASIX) tablet 80 mg  80 mg Oral DAILY  spironolactone (ALDACTONE) tablet 200 mg  200 mg Oral DAILY  traZODone (DESYREL) tablet 50 mg  50 mg Oral QHS  guaiFENesin (ROBITUSSIN) 100 mg/5 mL oral liquid 100 mg  100 mg Oral Q4H PRN  
 sodium chloride (NS) flush 5-40 mL  5-40 mL IntraVENous Q8H  
 sodium chloride (NS) flush 5-40 mL  5-40 mL IntraVENous PRN  
 0.9% sodium chloride infusion  100 mL/hr IntraVENous CONTINUOUS  
 oxyCODONE-acetaminophen (PERCOCET) 5-325 mg per tablet 1 Tab  1 Tab Oral Q8H PRN  
 acetaminophen (TYLENOL) tablet 650 mg  650 mg Oral Q6H PRN Laboratory Recent Results (from the past 24 hour(s)) AMMONIA Collection Time: 02/02/19  3:35 AM  
Result Value Ref Range Ammonia 45 (H) 11 - 32 UMOL/L Radiology: 
Mri Brain Wo Cont Addendum Date: 1/30/2019 Addendum: This addendum is being made in order to correct a voice to text software transcription error. Impression #1 should state \"Probable recent acute/subacute infarct involving the anterior aspect of the left temporal stem. No evidence of associated hemorrhage or mass effect. \" I apologize for any confusion. Result Date: 1/30/2019 EXAM: MRI BRAIN W/O CONTRAST INDICATION: diplopia, possible CVA COMPARISON: No prior MR brain, noncontrast CT head 01/30/2019 TECHNIQUE: Multiplanar multi sequence MR imaging of the brain was performed utilizing axial T2, FLAIR, diffusion, T2 gradient echo, and multiplanar T1 pre contrast imaging. No gadolinium was administered due to specific clinician request. _______________________ FINDINGS: VENTRICLES/EXTRA-AXIAL SPACES: The ventricles and sulci are normal in their size and configuration.  BRAIN PARENCHYMA: Small discrete focus of abnormal diffusion hyperintensity is present involving the anterior aspect of the left temporal stem, axial diffusion image 16. Corresponding ADC map shows relative isointensity. There is corresponding T2/FLAIR hyperintensity present on axial image 14 and coronal image 11. No mass effect or associated hemorrhage is seen. No frankly restricted diffusion is seen. A few scattered small T2/FLAIR hyperintense foci are seen in the periventricular and subcortical white matter. No evidence of intracranial mass effect, midline shift, or herniation. No abnormal restricted diffusion to suggest acute infarct. No susceptibility artifact to suggest intraparenchymal hemorrhage. VASCULATURE:  The major intracranial vascular flow voids are grossly normal. ORBITS: The bilateral lenses are absent, likely due to prior cataract surgery. PARANASAL SINUSES/MASTOIDS: Visualized paranasal sinuses are clear. Visualized mastoid air cells are clear. OSSEOUS STRUCTURES: Unremarkable OTHER: Partially empty sella is identified. ________________________ IMPRESSION: 1. Probable recent acute/subacute infarct involving the anterior aspect left renal stent. No evidence of associated hemorrhage or mass effect. 2.  Minimal nonspecific white matter disease likely representing chronic small vessel changes. Ct Head Wo Cont Result Date: 1/30/2019 EXAM: CT head HISTORY: -From Provider: vertigo -Additional: None COMPARISON: None. TECHNIQUE: Axial CT imaging of the head was performed without intravenous contrast. Sagittal and coronal reconstructions were created from the axial data. One or more dose reduction techniques were used on this CT: automated exposure control, adjustment of the mAs and/or kVp according to patient size, and iterative reconstruction techniques. The specific techniques used on this CT exam have been documented in the patient's electronic medical record.  Digital Imaging and Communications in Medicine (DICOM) format image data are available to nonaffiliated external healthcare facilities or entities on a secure, media free, reciprocally searchable basis with patient authorization for at least a 12-month period after this study. _______________ FINDINGS: BRAIN, POSTERIOR FOSSA, EXTRA-AXIAL SPACES AND MENINGES:  The sulci, folia, ventricles and basal cisterns are   within normal limits for the patient's age. Mild periventricular hypodensity. Punctate left basal ganglia hypodensity. There is no intracranial hemorrhage, mass effect, or midline shift. There are no abnormal extra-axial fluid collections. CALVARIUM: Intact. SINUSES/MASTOIDS: Clear in visualized extent. OTHER: None. _______________ IMPRESSION: 1. Punctate left basal ganglia hypodensity, age indeterminate suggest correlation with level and site of clinical symptoms to evaluate significance. 2. Minor white matter findings non-specific, but typically reflecting chronic ischemic change in a patient of this age. Please note that head CT may be negative in the acute setting and MRI could best further evaluate for acute/subacute ischemia/infarct in the high/persistent index of clinical suspicion. Cta Head Neck W Wo Cont Result Date: 1/31/2019 EXAM:  CT angiogram of the head and neck with intravenous contrast. CLINICAL INDICATION/HISTORY:Blurred vision, headache, difficulty with balance, CVA. COMPARISON: Correlation CT head and brain MRI 1/30/2019 TECHNIQUE:  Following IV administration of nonionic contrast, helical CTA head and neck performed. Three-dimensional, maximum intensity projection, and curved planar reformations were post-processed at a dedicated outside facility ([id number]). Stenoses are reported with reference to the downstream lumen (\"NASCET\"). One or more dose reduction techniques were used on this CT: automated exposure control, adjustment of the mAs and/or kVp according to patient's size, and iterative reconstruction techniques. The specific techniques utilized on this CT exam have been documented in the patient's electronic medical record. Digital Imaging and Communications in Medicine (DICOM) format image data are available to nonaffiliated external healthcare facilities or entities on a secure, media free, reciprocally searchable basis with patient authorization for at least a 12-month period after this study. _______________ FINDINGS: NECK CTA:      ARTERIAL BOLUS QUALITY:  Satisfactory. AORTIC ARCH: Incidental normal variant origin left vertebral artery from aortic arch. No proximal great vessel stenosis. RIGHT CAROTID ARTERY:  No significant common carotid or internal carotid artery stenosis, mild eccentric calcified plaque carotid bifurcation, 0% diameter stenosis proximal ICA by NASCET criteria. LEFT CAROTID ARTERY:  No significant common carotid or internal carotid artery stenosis. VERTEBRAL ARTERIES: The vertebral arteries areleft side dominant. RIGHT VERTEBRAL ARTERY:  No stenosis or other vascular abnormality. LEFT VERTEBRAL ARTERY:  No stenosis or other vascular abnormality. LUNG APICES: No mass. NECK SOFT TISSUES: No significant neck soft tissue abnormality. OSSEOUS: Degenerative spondylosis, no high-grade canal stenosis. BRAIN CTA:      CAROTID SIPHON AND SUPRACLINOID ICA: Minimal atherosclerotic changes without stenosis or aneurysm. M1 SEGMENT MCA: No significant stenosis or aneurysm. PROXIMAL M2 SEGMENT MCA: No significant stenosis or aneurysm. A1 SEGMENT, ANTERIOR COMMUNICATING ARTERY AND PROXIMAL A2 SEGMENTS:   Patent anterior communicating artery is developmentally small but patent right A1 segment, no significant stenosis or aneurysm. ,      VERTEBRAL BASILAR SYSTEM:Fetal type left-sided PCA with patent right posterior communicating artery with small patent right P1 segment.  No significant distal vertebral or basilar stenosis with apparent developmental termination of the distal right vertebral artery in PICA. No significant basilar stenosis. PCA:No significant stenosis or aneurysm. DISTAL ANTERIOR CEREBRAL ARTERY:No significant stenosis or aneurysm. DISTAL MCA M2/M3 SEGMENT:No significant stenosis or aneurysm. DURAL VENOUS SINUSES: Unremarkable. BRAIN PARENCHYMA ON SOURCE DATA: No significant brain parenchymal abnormality. _______________ IMPRESSION: 1. No hemodynamically significant cervical vascular stenosis. 2. Unremarkable brain CTA. Mrv Brain W Wo Cont Result Date: 1/31/2019 EXAM: MRV BRAIN W WO CONT CLINICAL INDICATION/HISTORY: stroke   > Additional: Intermittent diplopia and headache COMPARISON: Correlation brain MRI 1/30/2019 and CTA head and neck today   > Reference Exam: None. TECHNIQUE:  2-D time of flight coronally acquired MRV was performed intracranially with multiple MIP reconstruction images obtained. Following gadolinium administration, fat saturated coronally acquired time-of-flight acquisition obtained with multiple MIP reconstructions obtained. _______________ FINDINGS: There are several filling defects within the distal left transverse dural venous sinus and junction of transverse and sigmoid dural venous sinus and a single filling defect involving distal right transverse dural venous sinus. These show associated T2 hyperintensity on coronal T2 brain MRI imaging and appear contiguous with the subarachnoid space suggestive of prominent arachnoid granulations. There is some associated focal narrowing of the transverse dural sinuses bilaterally. Normal-appearing superior and inferior sagittal sinuses and vein of Harley. Normal-appearing sigmoid dural venous sinuses and visualized upper internal jugular veins, right side dominant. _______________ IMPRESSION: 1. No evidence of dural venous sinus thrombosis.  2. Dominant arachnoid granulations causing filling defects within the bilateral distal transverse dural venous sinuses with some associated narrowing of the sinuses at these sites. ASSESSMENT/IMPRESSION:  
58 YO male with PMH of cirrhosis, HCV, thrombocytopenia presents with elevated ammonia level, intermittent headache and double vision. MRI shows Probable recent acute/subacute infarct involving the anterior aspect of the left temporal stem. No evidence of associated hemorrhage or mass effect. 
  
1. Ischemic stroke: TTE shows PFO, unfortunately given his current cirrhosis, esophageal varices, elevated INR and thrombocytopenia, further secondary stroke prevention in the setting of PFO such as antiplatelet and anticoagulant are contraindicated. Discussed the case with Dr. Kasi Mejia, we both agree that risk overweighs benefit to do TRINY at this point since it will not change our management. 2. Diplopia: intermittent diplopia on all direction of gaze can be caused by hepatic encephalopathy and presyncopal episode. Anatomic location of the ischemic stroke cannot explain the symptoms. 3. Headache: episodical, lasts for 15 min, several brain image ruled out hemorrhagic changes. It is associated with current medical condition.  
  
  
RECOMMENDATIONS: 
1. Risk overweighs benefit to start antiplatelet and anticoagulant due to thrombocytopenia and elevated INR from cirrhosis. 2. Continue Lipitor. 3. Treat Tylenol symptomatically. 4. Treat cirrhosis per hospitalist team. 
5. BP< 140/90 6. PT/OT/ST Neurology  signs off, I will follow the patient in the clinic. Please do not hesitate to return with any questions. Signed:  
Saman Mendez MD 
2/2/2019 
9:13 AM

## 2019-02-02 NOTE — CONSULTS
[facility] Consult Note      Patient: La Query MRN: [id number]  SSN: [id number]    YOB: 1955  Age: 59 y.o. Sex: male    Date of Consultation: 02/01/2019  Referring Physician: Chetna Chappell MD  Reason for Consultation: Abnormal Echo    Chief complain: Headache, Diplopia    HPI: 59year old gentleman came to ER with headache and diplopia. Cardiology consult called for abnormal echocardiogram. Patient came with headache, diplopia and blurry vision. He is being managed for CVA. MRI revealed Probable recent acute/subacute infarct involving the anterior aspect of the left temporal stem. Denies any chest pain on exertion. C/o shortness of breath on exertion. Denies any orthopnea or PND. C/o palpitation off and on. Denies any dizziness, presyncope or syncope. He has hepatitis C. He is current smoker. Denies any alcohol abuse. Past Medical History:   Diagnosis Date    Cancer ([facility] [address] 11/2011    basal cell carcinoma/L cheek    Cirrhosis ([facility] [address]     Secondary to chronic HCV    Contact dermatitis and other eczema, due to unspecified cause     Esophageal varices in cirrhosis (HCC)     GERD (gastroesophageal reflux disease)     H/O in the past    Hep C w/ coma, chronic ([address]     cured from Hep C     Hepatitis C     Cured in 2-2016    Ill-defined condition     Liver disease     Other ill-defined conditions(799.89)     hands and bottom of feet going numb lately    Skin cancer     BCC left cheek 2011    Upper GI bleed     Secondary to NSAID induced gastritis, 09/10. Past Surgical History:   Procedure Laterality Date    EGD  09/10    EGD while hospitalized in [place]-reported to demonstrate severe gastritis from [facility] with small esophageal varices.     ENDOSCOPY, COLON, DIAGNOSTIC  09/10    Colonoscopy while in hospitalized in [facility]- reported to demonstrate colonic polyps    HX CATARACT REMOVAL  2018    HX HERNIA REPAIR      umbilical    HX LUMBAR LAMINECTOMY  1992    HX MALIGNANT SKIN LESION EXCISION  11/1/2011    standard excision to L cheek d/t basal cell carcinoma    HX TONSILLECTOMY       Current Facility-Administered Medications   Medication Dose Route Frequency    sodium chloride (OCEAN) 0.65 % nasal squeeze bottle 1 Spray  1 Spray Both Nostrils PRN    [START ON 2/2/2019] atorvastatin (LIPITOR) tablet 10 mg  10 mg Oral DAILY    lactulose (CHRONULAC) solution 20 g  30 mL Oral Q4H    [START ON 2/2/2019] furosemide (LASIX) tablet 80 mg  80 mg Oral DAILY    [START ON 2/2/2019] spironolactone (ALDACTONE) tablet 200 mg  200 mg Oral DAILY    traZODone (DESYREL) tablet 50 mg  50 mg Oral QHS    guaiFENesin (ROBITUSSIN) 100 mg/5 mL oral liquid 100 mg  100 mg Oral Q4H PRN    sodium chloride (NS) flush 5-40 mL  5-40 mL IntraVENous Q8H    sodium chloride (NS) flush 5-40 mL  5-40 mL IntraVENous PRN    0.9% sodium chloride infusion  100 mL/hr IntraVENous CONTINUOUS    oxyCODONE-acetaminophen (PERCOCET) 5-325 mg per tablet 1 Tab  1 Tab Oral Q8H PRN    acetaminophen (TYLENOL) tablet 650 mg  650 mg Oral Q6H PRN       Allergies and Intolerances: Allergies   Allergen Reactions    Aspirin Other (comments)     Bleeding      Valium [Diazepam] Swelling       Family History:   Family History   Problem Relation Age of Onset    Migraines Brother     Malignant Hyperthermia Neg Hx     Pseudocholinesterase Deficiency Neg Hx     Delayed Awakening Neg Hx     Post-op Nausea/Vomiting Neg Hx     Emergence Delirium Neg Hx     Post-op Cognitive Dysfunction Neg Hx     Other Neg Hx        Social History:   He  reports that he has been smoking. He has a 8.80 pack-year smoking history. he has never used smokeless tobacco.  He  reports that he does not drink alcohol. Review of Systems:     Gen: No fever, chills, malaise, weight loss/gain. Heent: No headache, rhinorrhea, epistaxis, ear pain, hearing loss, sinus pain, neck pain/stiffness, sore throat.    Heart: Positive shortness of breath on exertion No chest pain, palpitations, pnd, or orthopnea. Resp: No cough, hemoptysis, wheezing and dyspnea  GI: No nausea, vomiting, diarrhea, constipation, melena or hematochezia. : No urinary obstruction, dysuria or hematuria. Derm: No rash, new skin lesion or pruritis. Musc/skeletal: no bone or joint complains. Vasc: Positive edema, No cyanosis or claudication. Endo: No heat/cold intolerance, no polyuria,polydipsia or polyphagia. Neuro: No unilateral weakness, numbness, tingling. No seizures. Heme: No easy bruising or bleeding. Physical:   Patient Vitals for the past 6 hrs:   Temp Pulse Resp BP SpO2   02/01/19 1950 98.2 °F (36.8 °C) 80 18 116/60 100 %   02/01/19 1617 98 °F (36.7 °C) 79 18 117/54 100 %         Exam:   General Appearance: Comfortable, not using accessory muscles of respiration. HEENT: CELESTE. HEAD: Atraumatic  NECK: No JVD, no thyroidomeglay. CAROTIDS: No bruit  LUNGS: Clear bilaterally. HEART: S1+S2 audible, no murmur, no pericardial rub. ABD: Non-tender, BS Audible    EXT: + leg edema right more than left, and no cysnosis. VASCULAR EXAM: Pulses are intact. PSYCHIATRIC EXAM: Mood is appropriate. MUSCULOSKELETAL: Grossly no joint deformity.   NEUROLOGICAL: AAO times 3, Motor and sensory sytem intact    Review of Data:   LABS:   Lab Results   Component Value Date/Time    WBC 5.9 01/30/2019 09:55 AM    HGB 13.9 01/30/2019 09:55 AM    HCT 39.1 01/30/2019 09:55 AM    PLATELET 57 (L) 60/67/0424 09:55 AM     Lab Results   Component Value Date/Time    Sodium 136 01/30/2019 09:55 AM    Potassium 3.8 01/30/2019 09:55 AM    Chloride 102 01/30/2019 09:55 AM    CO2 26 01/30/2019 09:55 AM    Glucose 113 (H) 01/30/2019 09:55 AM    BUN 10 01/30/2019 09:55 AM    Creatinine 0.87 01/30/2019 09:55 AM     Lab Results   Component Value Date/Time    Cholesterol, total 158 01/31/2019 01:05 AM    HDL Cholesterol 52 01/31/2019 01:05 AM    LDL, calculated 80.4 01/31/2019 01:05 AM    Triglyceride 128 01/31/2019 01:05 AM     No results found for: GPT  Lab Results   Component Value Date/Time    Hemoglobin A1c 4.5 01/31/2019 01:05 AM         Cardiology Procedures:   Results for orders placed or performed during the hospital encounter of 01/30/19   EKG, 12 LEAD, INITIAL   Result Value Ref Range    Ventricular Rate 88 BPM    Atrial Rate 88 BPM    P-R Interval 164 ms    QRS Duration 84 ms    Q-T Interval 386 ms    QTC Calculation (Bezet) 467 ms    Calculated P Axis 45 degrees    Calculated R Axis 33 degrees    Calculated T Axis 39 degrees    Diagnosis       Normal sinus rhythm  Nonspecific T wave abnormality  Prolonged QT  Abnormal ECG               Impression / Plan:    Patient Active Problem List   Diagnosis Code    Chronic hepatitis C genotype 1 B18.2    Cirrhosis (HCC) K74.60    Upper GI bleed secondary to NSAID induced gastritis - 9/2010 K92.2    S/P lumbar laminectomy with spinal fusion - 1992 Z98.1    Colon polyps K63.5    Esophageal varices (HCC) I85.00    Thrombocytopenia secondary to cirrhosis D69.6    Portal hypertensive gastropathy (HCC) K76.6, K31.89    Cellulitis L03.90    CVA (cerebral vascular accident) ([address] I63.9    Elevated INR R79.1    Back pain M54.9         Hepatic encephalopathy (HCC) K72.90     Abnormal echocardiogram : Positive bubble study    DVT study is negative. Patient will need TRINY. All risks, benefits and alternatives explained to patient and he verbally understood.   Continue management as per medical team.  Continue management as per Neurologist             Signed By: Shaneka Faye MD     February 1, 2019

## 2019-02-02 NOTE — ROUTINE PROCESS
Bedside and Verbal shift change report given to 2220 Magalie Martínez RN (oncoming nurse) by Jarred Vargas RN 
 (offgoing nurse). Report included the following information SBAR, Kardex, Intake/Output, MAR, Recent Results and Med Rec Status.

## 2019-02-02 NOTE — PROGRESS NOTES
Problem: Falls - Risk of 
Goal: *Absence of Falls Document Hamaby Debi Fall Risk and appropriate interventions in the flowsheet. Outcome: Progressing Towards Goal 
Fall Risk Interventions: 
Mobility Interventions: Patient to call before getting OOB Medication Interventions: Evaluate medications/consider consulting pharmacy, Patient to call before getting OOB, Teach patient to arise slowly

## 2019-02-02 NOTE — PROGRESS NOTES
Problem: Mobility Impaired (Adult and Pediatric) Goal: *Acute Goals and Plan of Care (Insert Text) Physical Therapy Goals Initiated 1/30/2019 and to be accomplished within 3-5 day(s) 1. Patient will move from supine <> sit with S in prep for out of bed activity and change of position. 2.  Patient will perform sit<> stand with S with LRAD in prep for transfers/ambulation. 3.  Patient will transfer from bed <> chair with S with LRAD for time up in chair for completion of ADL activity. 4.  Patient will ambulate 150 feet with LRAD/S for improved functional mobility/safe discharge. 5.  Patient will ascend/descend 3-5 stairs with handrail with minimal assistance/contact guard assist for home re-entry as needed. Outcome: Not Progressing Towards Goal 
Pt refused PT due to: 
[x]  \"I'm skipping PT today. My head hurts too bad!' []  Eating 
[]  Pain 
[]  Pt lethargic 
[]  Off Unit Not responding to encouragement Will f/u later, as pt's schedule allows. Thank you.  
Garfield Holland, PTA

## 2019-02-02 NOTE — PROGRESS NOTES
Problem: Falls - Risk of 
Goal: *Absence of Falls Document Socorro Blanca Fall Risk and appropriate interventions in the flowsheet. Outcome: Progressing Towards Goal 
Fall Risk Interventions: 
Mobility Interventions: Communicate number of staff needed for ambulation/transfer Medication Interventions: Patient to call before getting OOB

## 2019-02-02 NOTE — PROGRESS NOTES
Problem: Falls - Risk of 
Goal: *Absence of Falls Document Ash Dust Fall Risk and appropriate interventions in the flowsheet. Outcome: Progressing Towards Goal 
Fall Risk Interventions: 
Mobility Interventions: Assess mobility with egress test, Bed/chair exit alarm, OT consult for ADLs Medication Interventions: Assess postural VS orthostatic hypotension, Bed/chair exit alarm, Patient to call before getting OOB

## 2019-02-02 NOTE — PROGRESS NOTES
Cardiology Progress Note Patient: Burton Maher        Sex: male          DOA: 1/30/2019 YOB: 1955      Age:  59 y.o.        LOS:  LOS: 3 days Patient seen and examined, chart reviewed Assessment/Plan Patient Active Problem List  
Diagnosis Code  Chronic hepatitis C genotype 1 B18.2  Cirrhosis ([facility] K74.60  Upper GI bleed secondary to NSAID induced gastritis - 9/2010 K92.2  
 S/P lumbar laminectomy with spinal fusion - 1992 Z98.1  Colon polyps K63.5  Esophageal varices (HCC) I85.00  Thrombocytopenia secondary to cirrhosis D69.6  Portal hypertensive gastropathy (HCC) K76.6, K31.89  
 Cellulitis L03.90  CVA (cerebral vascular accident) ([facility] [address] I63.9  Elevated INR R79.1  Back pain M54.9  Chest pressure R07.89  
 Hepatic encephalopathy (HCC) K72.90 Echocardiogram revealed RIGHT to LEFT shunt, most likely PFO. Plan: 
 
Discussed with Neurologist due to thrombocytopenia, elevated INR and esophageal varices he is not candidate for any antiplatelet or anticoagulation therapy. Plan was to do TRINY but if he is not candidate for antiplatelet on anti-coagulation therapy TRINY will not change the treatment plan. As risk of procedure outweight the benefit and there will not be any change in treatment paln TRINY is not indicated at this time. There is more risk of bleeding from thrombocytopenia, elevated INR and esophageal varices. Continue management as per medical team. 
Plan discussed with patient. Cardiology signoff Follow-up in cardiology clinic 4-6 weeks after discharge. Subjective:  
 cc: I still have double vision. REVIEW OF SYSTEMS:  
 
General: No fevers or chills. Cardiovascular: No chest pain,No palpitations, No orthopnea, No PND, No leg swelling, No claudication Pulmonary: No dyspnea. Gastrointestinal: No nausea, vomiting, bleeding Neurology: No Dizziness Objective:  
  
Visit Vitals /58 Pulse 86 Temp 97.8 °F (36.6 °C) Resp 16 Ht 5' 11\" (1.803 m) Wt 106.7 kg (235 lb 4.8 oz) SpO2 99% BMI 32.82 kg/m² Body mass index is 32.82 kg/m². Physical Exam: 
General Appearance: Comfortable, not using accessory muscles of respiration. HEENT: CELESTE. HEAD: Atraumatic NECK: No JVD, no thyroidomeglay. CAROTIDS:no bruit LUNGS: Clear bilaterally. HEART: S1+S2 audible, no murmur, no pericardial rub. ABD: Non-tender, BS Audible EXT: ++ edema, and no cyanosis. VASCULAR EXAM: Pulses are intact. NEUROLOGICAL: Alert, follows verbal commands. Medication: 
Current Facility-Administered Medications Medication Dose Route Frequency  nicotine (NICODERM CQ) 14 mg/24 hr patch 1 Patch  1 Patch TransDERmal DAILY  sodium chloride (OCEAN) 0.65 % nasal squeeze bottle 1 Spray  1 Spray Both Nostrils PRN  
 atorvastatin (LIPITOR) tablet 10 mg  10 mg Oral DAILY  lactulose (CHRONULAC) solution 20 g  30 mL Oral Q4H  
 furosemide (LASIX) tablet 80 mg  80 mg Oral DAILY  spironolactone (ALDACTONE) tablet 200 mg  200 mg Oral DAILY  traZODone (DESYREL) tablet 50 mg  50 mg Oral QHS  guaiFENesin (ROBITUSSIN) 100 mg/5 mL oral liquid 100 mg  100 mg Oral Q4H PRN  
 sodium chloride (NS) flush 5-40 mL  5-40 mL IntraVENous Q8H  
 sodium chloride (NS) flush 5-40 mL  5-40 mL IntraVENous PRN  
 0.9% sodium chloride infusion  100 mL/hr IntraVENous CONTINUOUS  
 oxyCODONE-acetaminophen (PERCOCET) 5-325 mg per tablet 1 Tab  1 Tab Oral Q8H PRN  
 acetaminophen (TYLENOL) tablet 650 mg  650 mg Oral Q6H PRN Lab/Data Reviewed: 
 
  
No results for input(s): WBC, HGB, HCT, PLT, HGBEXT, HCTEXT, PLTEXT in the last 72 hours. No results for input(s): NA, K, CL, CO2, GLU, BUN, CREA, CA in the last 72 hours. Signed By: Buzz Flores MD   
 February 2, 2019

## 2019-02-03 ENCOUNTER — HOME HEALTH ADMISSION (OUTPATIENT)
Dept: HOME HEALTH SERVICES | Facility: HOME HEALTH | Age: 64
End: 2019-02-03
Payer: MEDICARE

## 2019-02-03 VITALS
WEIGHT: 235.3 LBS | BODY MASS INDEX: 32.94 KG/M2 | HEIGHT: 71 IN | TEMPERATURE: 98.2 F | HEART RATE: 85 BPM | OXYGEN SATURATION: 99 % | SYSTOLIC BLOOD PRESSURE: 113 MMHG | RESPIRATION RATE: 18 BRPM | DIASTOLIC BLOOD PRESSURE: 62 MMHG

## 2019-02-03 LAB — AMMONIA PLAS-SCNC: 58 UMOL/L (ref 11–32)

## 2019-02-03 PROCEDURE — 82140 ASSAY OF AMMONIA: CPT

## 2019-02-03 PROCEDURE — 74011250637 HC RX REV CODE- 250/637: Performed by: INTERNAL MEDICINE

## 2019-02-03 PROCEDURE — 74011250637 HC RX REV CODE- 250/637: Performed by: PHYSICIAN ASSISTANT

## 2019-02-03 PROCEDURE — 74011250637 HC RX REV CODE- 250/637: Performed by: HOSPITALIST

## 2019-02-03 RX ORDER — RANITIDINE 150 MG/1
150 TABLET, FILM COATED ORAL DAILY
Qty: 60 TAB | Refills: 0 | Status: SHIPPED | OUTPATIENT
Start: 2019-02-04 | End: 2019-05-01

## 2019-02-03 RX ORDER — GUAIFENESIN 100 MG/5ML
100 SOLUTION ORAL
Qty: 1 BOTTLE | Refills: 0 | Status: SHIPPED | OUTPATIENT
Start: 2019-02-03 | End: 2019-05-01

## 2019-02-03 RX ORDER — ATORVASTATIN CALCIUM 10 MG/1
10 TABLET, FILM COATED ORAL DAILY
Qty: 30 TAB | Refills: 0 | Status: SHIPPED | OUTPATIENT
Start: 2019-02-04

## 2019-02-03 RX ORDER — OXYCODONE AND ACETAMINOPHEN 5; 325 MG/1; MG/1
1 TABLET ORAL
Qty: 30 TAB | Refills: 0 | Status: SHIPPED | OUTPATIENT
Start: 2019-02-03 | End: 2019-05-01

## 2019-02-03 RX ORDER — GUAIFENESIN 100 MG/5ML
100 SOLUTION ORAL
Qty: 1 BOTTLE | Refills: 0 | Status: SHIPPED | OUTPATIENT
Start: 2019-02-03 | End: 2019-02-03

## 2019-02-03 RX ORDER — RANITIDINE 150 MG/1
150 TABLET, FILM COATED ORAL DAILY
Qty: 60 TAB | Refills: 0 | Status: SHIPPED | OUTPATIENT
Start: 2019-02-04 | End: 2019-02-03

## 2019-02-03 RX ORDER — ATORVASTATIN CALCIUM 10 MG/1
10 TABLET, FILM COATED ORAL DAILY
Qty: 30 TAB | Refills: 0 | Status: SHIPPED | OUTPATIENT
Start: 2019-02-04 | End: 2019-02-03

## 2019-02-03 RX ADMIN — ATORVASTATIN CALCIUM 10 MG: 10 TABLET, FILM COATED ORAL at 09:15

## 2019-02-03 RX ADMIN — OXYCODONE AND ACETAMINOPHEN 1 TABLET: 5; 325 TABLET ORAL at 11:23

## 2019-02-03 RX ADMIN — Medication 10 ML: at 06:00

## 2019-02-03 RX ADMIN — RANITIDINE 150 MG: 150 TABLET ORAL at 09:15

## 2019-02-03 RX ADMIN — ACETAMINOPHEN 650 MG: 325 TABLET ORAL at 05:42

## 2019-02-03 RX ADMIN — LACTULOSE 20 G: 20 SOLUTION ORAL at 00:26

## 2019-02-03 RX ADMIN — SPIRONOLACTONE 200 MG: 100 TABLET, FILM COATED ORAL at 09:14

## 2019-02-03 RX ADMIN — FUROSEMIDE 80 MG: 40 TABLET ORAL at 09:14

## 2019-02-03 RX ADMIN — OXYCODONE AND ACETAMINOPHEN 1 TABLET: 5; 325 TABLET ORAL at 03:31

## 2019-02-03 RX ADMIN — LACTULOSE 20 G: 20 SOLUTION ORAL at 03:27

## 2019-02-03 NOTE — PROGRESS NOTES
Problem: Falls - Risk of 
Goal: *Absence of Falls Document Yin Juarez Fall Risk and appropriate interventions in the flowsheet. Outcome: Progressing Towards Goal 
Fall Risk Interventions: 
Mobility Interventions: Assess mobility with egress test, Bed/chair exit alarm, Patient to call before getting OOB Medication Interventions: Patient to call before getting OOB, Teach patient to arise slowly, Bed/chair exit alarm

## 2019-02-03 NOTE — PROGRESS NOTES
Bedside shift change report given to Tessa Jarquin (oncoming nurse) by Nirmala Luo RN (offgoing nurse).  Report included the following information SBAR, Kardex, ED Summary, Intake/Output, MAR, Accordion, Recent Results and Pre Procedure Checklist.

## 2019-02-03 NOTE — DISCHARGE SUMMARY
Discharge Summary    Patient: Conrado Ugarte MRN: [id number]  CSN: [id number]    YOB: 1955  Age: 59 y.o.   Sex: male    DOA: 1/30/2019 LOS:  LOS: 4 days   Discharge Date:      Primary Care Provider:  Florence Montero MD    Admission Diagnoses: CVA (cerebral vascular accident) [facility])    Discharge Diagnoses:    Problem List as of 2/3/2019 Date Reviewed: 1/30/2019          Codes Class Noted - Resolved    Hepatic encephalopathy ([facility] ICD-10-CM: K72.90  ICD-9-CM: 572.2  2/1/2019 - Present        * (Principal) CVA (cerebral vascular accident) ([facility] ICD-10-CM: I63.9  ICD-9-CM: 434.91  1/30/2019 - Present        Elevated INR ICD-10-CM: R79.1  ICD-9-CM: 790.92  1/30/2019 - Present        Back pain ICD-10-CM: M54.9  ICD-9-CM: 724.5  1/30/2019 - Present        Chest pressure ICD-10-CM: R07.89  ICD-9-CM: 786.59  1/30/2019 - Present        Cellulitis ICD-10-CM: L03.90  ICD-9-CM: 682.9  7/24/2018 - Present        Portal hypertensive gastropathy ([facility] ICD-10-CM: K76.6, K31.89  ICD-9-CM: 572.3, 537.89  12/22/2013 - Present        Cirrhosis ([facility] ICD-10-CM: K74.60  ICD-9-CM: 571.5  9/5/2011 - Present        Upper GI bleed secondary to NSAID induced gastritis - 9/2010 ICD-10-CM: K92.2  ICD-9-CM: 578.9  9/5/2011 - Present        S/P lumbar laminectomy with spinal fusion - 1992 ICD-10-CM: Z98.1  ICD-9-CM: V45.4  9/5/2011 - Present        Colon polyps ICD-10-CM: K63.5  ICD-9-CM: 211.3  9/5/2011 - Present    Overview Signed 9/5/2011 10:38 AM by Radha Martinez MD     Last colonoscopy 9/2010             Esophageal varices ([facility] ICD-10-CM: I85.00  ICD-9-CM: 456.1  9/5/2011 - Present        Thrombocytopenia secondary to cirrhosis ICD-10-CM: D69.6  ICD-9-CM: 287.5  9/5/2011 - Present        Chronic hepatitis C genotype 1 ICD-10-CM: B18.2  ICD-9-CM: 070.54  Unknown - Present        RESOLVED: GI bleed ICD-10-CM: K92.2  ICD-9-CM: 578.9  10/17/2013 - 12/22/2013              Discharge Medications:     Current Discharge Medication List      START taking these medications    Details   raNITIdine (ZANTAC) 150 mg tablet Take 1 Tab by mouth daily. Qty: 60 Tab, Refills: 0      oxyCODONE-acetaminophen (PERCOCET) 5-325 mg per tablet Take 1 Tab by mouth every eight (8) hours as needed. Max Daily Amount: 3 Tabs. Qty: 30 Tab, Refills: 0    Associated Diagnoses: S/P laminectomy with spinal fusion      guaiFENesin (ROBITUSSIN) 100 mg/5 mL liquid Take 5 mL by mouth every four (4) hours as needed for Cough. Qty: 1 Bottle, Refills: 0      atorvastatin (LIPITOR) 10 mg tablet Take 1 Tab by mouth daily. Qty: 30 Tab, Refills: 0         CONTINUE these medications which have NOT CHANGED    Details   traZODone (DESYREL) 50 mg tablet Take 50 mg by mouth nightly. furosemide (LASIX) 80 mg tablet Take 1 Tab by mouth daily. Qty: 90 Tab, Refills: 3      spironolactone (ALDACTONE) 100 mg tablet Take 2 Tabs by mouth daily. Qty: 180 Tab, Refills: 3      lactulose (CHRONULAC) 10 gram/15 mL solution Take 20 g by mouth three (3) times daily. Indications: Hepatic Encephalopathy         STOP taking these medications       trazodone HCl (TRAZODONE, BULK,) powd Comments:   Reason for Stopping:               Discharge Condition: Fair    Procedures : none    Consults: Cardiology, Gastroenterology and Neurology      PHYSICAL EXAM   Visit Vitals  /62 (BP 1 Location: Left arm, BP Patient Position: At rest)   Pulse 85   Temp 98.2 °F (36.8 °C)   Resp 18   Ht 5' 11\" (1.803 m)   Wt 106.7 kg (235 lb 4.8 oz)   SpO2 99%   BMI 32.82 kg/m²     General: Awake, cooperative, no acute distress    HEENT: NC, Atraumatic. PERRLA, EOMI. Anicteric sclerae. Lungs:  CTA Bilaterally. No Wheezing/Rhonchi/Rales. Heart:  Regular  rhythm,  No murmur, No Rubs, No Gallops  Abdomen: Soft, Non distended, Non tender. +Bowel sounds,   Extremities: No c/c/e  Psych:   Not anxious or agitated. Neurologic:  No acute neurological deficits. Admission HPI : Mohan Victoria is a 59 y.o. male who hx of cirrhosis, hcv came to ER due to on and off double vision over one week. He started he had blurred vision for a while, but vision became doubled on and off for one week, associated with headache, unbalanced, no slurred speech/weakness. Mri brain indicated acute/subcute infarct. Reported chest pressure, headache and back pain. Hospital Course :     Acute CVA  Patient admitted to telemetry with neuro consultation and stroke treatment. MRI brain was consistent with acute/subacute CVA of the anterior aspect of the left temporal stem. The patient c/o diplopia which, on neurology evaluation was thought not to correlate with the anatomic location of the ischemic stroke. An MRV was then ordered to r/o central venous thrombosis and was unremarkable. PFO:  A TTE was perform and was suggesting of PFO. Bilateral LE PVL showed no evidence of DVT. At the conclusion of the patient's evaluation Neurology and Cardiology in consultation with one another decided that the patient would not benefit from going forward with the TRINY and was not a candidate for PFO closure since he can not be anticoagulated after the procedure due to coagulopathy and thrombocytopenia 2/2 HCV cirrhosis. Follow-up with cardiology in clinic in 4-6 wks. Cirrhosis/Hepatic encephalopathy:  NH4 48 on admission and the patient's Lasix/aldactone were held. On HD#3 the patient NH4 increased to > 100 and his Lactulose was increased in frequency resulting in resolution back down to his baseline.   Upon discharge the patient will continue on his prior regimen in consultation with Dr Kira Reyes      Activity: Activity as tolerated    Diet: Resume previous diet    Follow-up: with primary care < 7 days, cardiology in 4-6 wks and with Dr Kira Reyes as scheduled    Disposition: home     Minutes spent on discharge: 39       Labs: Results:       Chemistry No results for input(s): GLU, NA, K, CL, CO2, BUN, CREA, CA, AGAP, BUCR, TBIL, GPT, AP, TP, ALB, GLOB, AGRAT in the last 72 hours. CBC w/Diff No results for input(s): WBC, RBC, HGB, HCT, PLT, GRANS, LYMPH, EOS, HGBEXT, HCTEXT, PLTEXT in the last 72 hours. Cardiac Enzymes No results for input(s): CPK, CKND1, GLADYS in the last 72 hours. No lab exists for component: CKRMB, TROIP   Coagulation No results for input(s): PTP, INR, APTT in the last 72 hours. No lab exists for component: INREXT    Lipid Panel Lab Results   Component Value Date/Time    Cholesterol, total 158 01/31/2019 01:05 AM    HDL Cholesterol 52 01/31/2019 01:05 AM    LDL, calculated 80.4 01/31/2019 01:05 AM    VLDL, calculated 25.6 01/31/2019 01:05 AM    Triglyceride 128 01/31/2019 01:05 AM    CHOL/HDL Ratio 3.0 01/31/2019 01:05 AM      BNP No results for input(s): BNPP in the last 72 hours. Liver Enzymes No results for input(s): TP, ALB, TBIL, AP, SGOT, GPT in the last 72 hours. No lab exists for component: DBIL   Thyroid Studies No results found for: T4, T3U, TSH, TSHEXT         Significant Diagnostic Studies: Mri Brain Wo Cont    Addendum Date: 1/30/2019    Addendum: This addendum is being made in order to correct a voice to text software transcription error. Impression #1 should state \"Probable recent acute/subacute infarct involving the anterior aspect of the left temporal stem. No evidence of associated hemorrhage or mass effect. \" I apologize for any confusion. Result Date: 1/30/2019  EXAM: MRI BRAIN W/O CONTRAST INDICATION: diplopia, possible CVA COMPARISON: No prior MR brain, noncontrast CT head 01/30/2019 TECHNIQUE: Multiplanar multi sequence MR imaging of the brain was performed utilizing axial T2, FLAIR, diffusion, T2 gradient echo, and multiplanar T1 pre contrast imaging.   No gadolinium was administered due to specific clinician request. _______________________ FINDINGS: VENTRICLES/EXTRA-AXIAL SPACES: The ventricles and sulci are normal in their size and configuration. BRAIN PARENCHYMA: Small discrete focus of abnormal diffusion hyperintensity is present involving the anterior aspect of the left temporal stem, axial diffusion image 16. Corresponding ADC map shows relative isointensity. There is corresponding T2/FLAIR hyperintensity present on axial image 14 and coronal image 11. No mass effect or associated hemorrhage is seen. No frankly restricted diffusion is seen. A few scattered small T2/FLAIR hyperintense foci are seen in the periventricular and subcortical white matter. No evidence of intracranial mass effect, midline shift, or herniation. No abnormal restricted diffusion to suggest acute infarct. No susceptibility artifact to suggest intraparenchymal hemorrhage. VASCULATURE:  The major intracranial vascular flow voids are grossly normal. ORBITS: The bilateral lenses are absent, likely due to prior cataract surgery. PARANASAL SINUSES/MASTOIDS: Visualized paranasal sinuses are clear. Visualized mastoid air cells are clear. OSSEOUS STRUCTURES: Unremarkable OTHER: Partially empty sella is identified. ________________________     IMPRESSION: 1. Probable recent acute/subacute infarct involving the anterior aspect left renal stent. No evidence of associated hemorrhage or mass effect. 2.  Minimal nonspecific white matter disease likely representing chronic small vessel changes. Ct Head Wo Cont    Result Date: 1/30/2019  EXAM: CT head HISTORY: -From Provider: vertigo -Additional: None COMPARISON: None. TECHNIQUE: Axial CT imaging of the head was performed without intravenous contrast. Sagittal and coronal reconstructions were created from the axial data. One or more dose reduction techniques were used on this CT: automated exposure control, adjustment of the mAs and/or kVp according to patient size, and iterative reconstruction techniques. The specific techniques used on this CT exam have been documented in the patient's electronic medical record.  Digital Imaging and Communications in Medicine (DICOM) format image data are available to nonaffiliated external healthcare facilities or entities on a secure, media free, reciprocally searchable basis with patient authorization for at least a 12-month period after this study. _______________ FINDINGS: BRAIN, POSTERIOR FOSSA, EXTRA-AXIAL SPACES AND MENINGES:  The sulci, folia, ventricles and basal cisterns are   within normal limits for the patient's age. Mild periventricular hypodensity. Punctate left basal ganglia hypodensity. There is no intracranial hemorrhage, mass effect, or midline shift. There are no abnormal extra-axial fluid collections. CALVARIUM: Intact. SINUSES/MASTOIDS: Clear in visualized extent. OTHER: None. _______________     IMPRESSION: 1. Punctate left basal ganglia hypodensity, age indeterminate suggest correlation with level and site of clinical symptoms to evaluate significance. 2. Minor white matter findings non-specific, but typically reflecting chronic ischemic change in a patient of this age. Please note that head CT may be negative in the acute setting and MRI could best further evaluate for acute/subacute ischemia/infarct in the high/persistent index of clinical suspicion. Cta Head Neck W Wo Cont    Result Date: 1/31/2019  EXAM:  CT angiogram of the head and neck with intravenous contrast. CLINICAL INDICATION/HISTORY:Blurred vision, headache, difficulty with balance, CVA. COMPARISON: Correlation CT head and brain MRI 1/30/2019 TECHNIQUE:  Following IV administration of nonionic contrast, helical CTA head and neck performed. Three-dimensional, maximum intensity projection, and curved planar reformations were post-processed at a dedicated outside facility ([facility]). Stenoses are reported with reference to the downstream lumen (\"NASCET\").   One or more dose reduction techniques were used on this CT: automated exposure control, adjustment of the mAs and/or kVp according to patient's size, and iterative reconstruction techniques. The specific techniques utilized on this CT exam have been documented in the patient's electronic medical record. Digital Imaging and Communications in Medicine (DICOM) format image data are available to nonaffiliated external healthcare facilities or entities on a secure, media free, reciprocally searchable basis with patient authorization for at least a 12-month period after this study. _______________ FINDINGS: NECK CTA:      ARTERIAL BOLUS QUALITY:  Satisfactory. AORTIC ARCH: Incidental normal variant origin left vertebral artery from aortic arch. No proximal great vessel stenosis. RIGHT CAROTID ARTERY:  No significant common carotid or internal carotid artery stenosis, mild eccentric calcified plaque carotid bifurcation, 0% diameter stenosis proximal ICA by NASCET criteria. LEFT CAROTID ARTERY:  No significant common carotid or internal carotid artery stenosis. VERTEBRAL ARTERIES: The vertebral arteries areleft side dominant. RIGHT VERTEBRAL ARTERY:  No stenosis or other vascular abnormality. LEFT VERTEBRAL ARTERY:  No stenosis or other vascular abnormality. LUNG APICES: No mass. NECK SOFT TISSUES: No significant neck soft tissue abnormality. OSSEOUS: Degenerative spondylosis, no high-grade canal stenosis. BRAIN CTA:      CAROTID SIPHON AND SUPRACLINOID ICA: Minimal atherosclerotic changes without stenosis or aneurysm. M1 SEGMENT MCA: No significant stenosis or aneurysm. PROXIMAL M2 SEGMENT MCA: No significant stenosis or aneurysm. A1 SEGMENT, ANTERIOR COMMUNICATING ARTERY AND PROXIMAL A2 SEGMENTS:   Patent anterior communicating artery is developmentally small but patent right A1 segment, no significant stenosis or aneurysm. ,      VERTEBRAL BASILAR SYSTEM:Fetal type left-sided PCA with patent right posterior communicating artery with small patent right P1 segment.  No significant distal vertebral or basilar stenosis with apparent developmental termination of the distal right vertebral artery in PICA. No significant basilar stenosis. PCA:No significant stenosis or aneurysm. DISTAL ANTERIOR CEREBRAL ARTERY:No significant stenosis or aneurysm. DISTAL MCA M2/M3 SEGMENT:No significant stenosis or aneurysm. DURAL VENOUS SINUSES: Unremarkable. BRAIN PARENCHYMA ON SOURCE DATA: No significant brain parenchymal abnormality. _______________     IMPRESSION: 1. No hemodynamically significant cervical vascular stenosis. 2. Unremarkable brain CTA. Mrv Brain W Wo Cont    Result Date: 1/31/2019  EXAM: MRV BRAIN W WO CONT CLINICAL INDICATION/HISTORY: stroke   > Additional: Intermittent diplopia and headache COMPARISON: Correlation brain MRI 1/30/2019 and CTA head and neck today   > Reference Exam: None. TECHNIQUE:  2-D time of flight coronally acquired MRV was performed intracranially with multiple MIP reconstruction images obtained. Following gadolinium administration, fat saturated coronally acquired time-of-flight acquisition obtained with multiple MIP reconstructions obtained. _______________ FINDINGS: There are several filling defects within the distal left transverse dural venous sinus and junction of transverse and sigmoid dural venous sinus and a single filling defect involving distal right transverse dural venous sinus. These show associated T2 hyperintensity on coronal T2 brain MRI imaging and appear contiguous with the subarachnoid space suggestive of prominent arachnoid granulations. There is some associated focal narrowing of the transverse dural sinuses bilaterally. Normal-appearing superior and inferior sagittal sinuses and vein of Harley. Normal-appearing sigmoid dural venous sinuses and visualized upper internal jugular veins, right side dominant. _______________     IMPRESSION: 1. No evidence of dural venous sinus thrombosis.  2. Dominant arachnoid granulations causing filling defects within the bilateral distal transverse dural venous sinuses with some associated narrowing of the sinuses at these sites. No results found for this or any previous visit.         CC: Bhaskar Dewitt MD

## 2019-02-03 NOTE — PROGRESS NOTES
Patient and family educated on discharge instructions with understanding noted. Patient and family states no concerns or questions at this time. Patient stable for discharge.

## 2019-02-03 NOTE — PROGRESS NOTES
1920: [facility] Family is resting in bed ain no acute distress. Patient states he's still experiencing diploplia. Patient reminded to call before getting OOB. Bed alarm activated. 0730: Uneventful shift. Bedside and Verbal shift change report given to Raimundo Muñoz RN (oncoming nurse) by Amna Valdez RN (offgoing nurse). Report included the following information SBAR, Kardex and MAR.

## 2019-02-03 NOTE — CONSULTS
[address] [facility], [place], [address], [place], [place]       MEJIA Brownlee, DAISY Palmer, [facility]   MEJIA Ol[place], MEJIA Salazar [facility] 136    at [address], [address] Radha Guan  22.    [phone]    FAX: [address] [place] [address], [address]    [phone]    FAX: [phone]       HEPATOLOGY CONSULT NOTE  The patient is well known to and regularly cared for at [place] [address]. [facility] not at [facility] [facility] today. I will return on Monday and see the patient at that time. I have reviewed the Emergency room note, Hospital admission note, Notes by all other physicians who have seen the patient during this hospitalization to date. I have reviewed the problem list and the reason for this hospitalization. I have reviewed the allergies and the medications the patient was taking at home prior to this hospitalization. The patient is a 59year old  male with cirrhosis secondary to chronic HCV. HCV was treated with Safia Slaughter in 2015 and achieved SVR/cure. He was last seen in the office just a few weeks ago in 1/2019. The patient has developed the following complications of cirrhosis: esophageal varices, edema, hepatic encephalopathy    The patient cam to the ED and was hospitalized because of double and blurred vision, imbalance. A brain MRi demonstrated an acute ischemic event. According to Dr Will Dominguez examination there is no ascites or edema. The liver disease/cirrhois is stable and no change in medical treatment is required. He should stay on his current dose of lactulose and diuretics. I will se him Monday AM if he is still hospitalized.   Other wise we will see him in the office for monitoring of cirrhosis. ASSESSMENT AND PLAN:  Chronic HCV  The patient was previously treated for HCV with Epclusa in the Astral 4 clinical trial for patients with Child class B cirrhosis. He received 12 weeks of treatment had a virologic response but then relapsed. He was then retreated with Havoni for 24 weeks and achieved a SVR/cure. The last HCV RNA was undetectable in 10/2018   No further HCV RNA testing is needed. Cirrhosis  Cirrhosis is secondary to chronic HCV. The diagnosis of cirrhosis is based upon elastography, imaging, laboratory studies, complications of cirrhosis. The CTP is 9. Child class B. The MELD score is 11. Ascites   This has resolved with current dose of diuretics and with curing HCV. Will continue the current dose of diuretics at step 1. Lower extremity edema  This has resolved with current dose of diuretics. Will continue the current dose of diuretics at step 1. Screening for Esophageal varices   The patient has esophageal varices without prior bleeding. Varices have been banded to obliteration. The last EGD to assess for varices was performed in 10/2018. No varices were present at that time. Hepatic encephalopathy   This is controlled on current doses of lactulose   Continue lactulose at the current dose. Serum ammonia has been close to normal except for a single value. No reason to continue to check ammonia. No need to restrict dietary protein at this time. Thrombocytopenia   This is secondary to cirrhosis. There is no evidence of overt bleeding. No treatment is required. The platelet count is adequate for the patient to undergo procedures without the need for platelet transfusion or platelet growth factors. Possible CVA. It is not felt that the patient had a CVA. Dr Goldy Ayoub does not feel that anti-platelet agents are indicated because of prolonged INR and low platelet count because of cirrhosis.       LABORATORY:  Results for Delbert Waite (MRN [id number]) as of 2/3/2019 05:41   Ref. Range 1/30/2019 09:55   WBC Latest Ref Range: 4.6 - 13.2 K/uL 5.9   HGB Latest Ref Range: 13.0 - 16.0 g/dL 13.9   PLATELET Latest Ref Range: 135 - 420 K/uL 57 (L)   INR Latest Ref Range: 0.8 - 1.2   1.5 (H)   Sodium Latest Ref Range: 136 - 145 mmol/L 136   Potassium Latest Ref Range: 3.5 - 5.5 mmol/L 3.8   Chloride Latest Ref Range: 100 - 108 mmol/L 102   CO2 Latest Ref Range: 21 - 32 mmol/L 26   Glucose Latest Ref Range: 74 - 99 mg/dL 113 (H)   BUN Latest Ref Range: 7.0 - 18 MG/DL 10   Creatinine Latest Ref Range: 0.6 - 1.3 MG/DL 0.87   Bilirubin, total Latest Ref Range: 0.2 - 1.0 MG/DL 3.0 (H)   Albumin Latest Ref Range: 3.4 - 5.0 g/dL 3.2 (L)   ALT (SGPT) Latest Ref Range: 16 - 61 U/L 33   AST Latest Ref Range: 15 - 37 U/L 48 (H)   Alk. phosphatase Latest Ref Range: 45 - 117 U/L 81   Ammonia Latest Ref Range: 11 - 32 UMOL/L 48 (H)       RADIOLOGY:  12/2018. Ultrasound of liver. Echogenic consistent with cirrhosis. No liver mass lesions. No dilated bile ducts. No ascites.       Sangita Zambrano MD  [facility] of [address], [address] [facility] 22.  [phone]  [address] [facility]

## 2019-02-04 ENCOUNTER — HOME CARE VISIT (OUTPATIENT)
Dept: SCHEDULING | Facility: HOME HEALTH | Age: 64
End: 2019-02-04
Payer: MEDICARE

## 2019-02-04 VITALS
HEART RATE: 84 BPM | DIASTOLIC BLOOD PRESSURE: 61 MMHG | OXYGEN SATURATION: 97 % | SYSTOLIC BLOOD PRESSURE: 98 MMHG | TEMPERATURE: 98.1 F | RESPIRATION RATE: 14 BRPM

## 2019-02-04 PROCEDURE — 400013 HH SOC

## 2019-02-04 PROCEDURE — G0299 HHS/HOSPICE OF RN EA 15 MIN: HCPCS

## 2019-02-04 PROCEDURE — 3331090001 HH PPS REVENUE CREDIT

## 2019-02-04 PROCEDURE — 3331090002 HH PPS REVENUE DEBIT

## 2019-02-05 ENCOUNTER — TELEPHONE (OUTPATIENT)
Dept: HEMATOLOGY | Age: 64
End: 2019-02-05

## 2019-02-05 ENCOUNTER — HOME CARE VISIT (OUTPATIENT)
Dept: SCHEDULING | Facility: HOME HEALTH | Age: 64
End: 2019-02-05
Payer: MEDICARE

## 2019-02-05 ENCOUNTER — HOME CARE VISIT (OUTPATIENT)
Dept: HOME HEALTH SERVICES | Facility: HOME HEALTH | Age: 64
End: 2019-02-05
Payer: MEDICARE

## 2019-02-05 PROCEDURE — G0151 HHCP-SERV OF PT,EA 15 MIN: HCPCS

## 2019-02-05 PROCEDURE — 3331090001 HH PPS REVENUE CREDIT

## 2019-02-05 PROCEDURE — 3331090002 HH PPS REVENUE DEBIT

## 2019-02-05 PROCEDURE — G0152 HHCP-SERV OF OT,EA 15 MIN: HCPCS

## 2019-02-05 NOTE — TELEPHONE ENCOUNTER
----- Message from [address] [place] [address] sent at 2/5/2019  8:06 AM EST -----      ----- Message -----  From: Ora Polk MD  Sent: 2/4/2019  10:03 AM  To: Cinda Milton NP, [address] [facility] Office    Hospitalized at [facility] this weekend with TIA/CVA. Liver fucntion stable. Schedule to see paola for FU some time this month.   MLS

## 2019-02-05 NOTE — TELEPHONE ENCOUNTER
Called patient to schedule him to see NP Tila Giraldo. Patient stated that his memory isn't so good right now and to call his daughters  (current care taker) at [phone]. Sara Hadley.  Scheduled appointment for 02/07/2019 at 1:00pm.

## 2019-02-06 VITALS
DIASTOLIC BLOOD PRESSURE: 56 MMHG | SYSTOLIC BLOOD PRESSURE: 96 MMHG | OXYGEN SATURATION: 96 % | RESPIRATION RATE: 16 BRPM | TEMPERATURE: 98.3 F | HEART RATE: 86 BPM

## 2019-02-06 VITALS — HEART RATE: 78 BPM | OXYGEN SATURATION: 96 % | DIASTOLIC BLOOD PRESSURE: 62 MMHG | SYSTOLIC BLOOD PRESSURE: 98 MMHG

## 2019-02-06 PROCEDURE — 3331090001 HH PPS REVENUE CREDIT

## 2019-02-06 PROCEDURE — 3331090002 HH PPS REVENUE DEBIT

## 2019-02-07 ENCOUNTER — OFFICE VISIT (OUTPATIENT)
Dept: HEMATOLOGY | Age: 64
End: 2019-02-07

## 2019-02-07 ENCOUNTER — HOSPITAL ENCOUNTER (OUTPATIENT)
Dept: LAB | Age: 64
Discharge: HOME | End: 2019-02-07
Payer: MEDICARE

## 2019-02-07 VITALS
TEMPERATURE: 97.6 F | WEIGHT: 229 LBS | SYSTOLIC BLOOD PRESSURE: 90 MMHG | OXYGEN SATURATION: 95 % | BODY MASS INDEX: 32.06 KG/M2 | HEART RATE: 68 BPM | DIASTOLIC BLOOD PRESSURE: 60 MMHG | HEIGHT: 71 IN

## 2019-02-07 DIAGNOSIS — K74.60 CIRRHOSIS OF LIVER WITHOUT ASCITES, UNSPECIFIED HEPATIC CIRRHOSIS TYPE (HCC): Primary | ICD-10-CM

## 2019-02-07 DIAGNOSIS — K74.60 CIRRHOSIS OF LIVER WITHOUT ASCITES, UNSPECIFIED HEPATIC CIRRHOSIS TYPE (HCC): ICD-10-CM

## 2019-02-07 LAB
ALBUMIN SERPL-MCNC: 3.1 G/DL (ref 3.4–5)
ALBUMIN/GLOB SERPL: 0.8 {RATIO} (ref 0.8–1.7)
ALP SERPL-CCNC: 74 U/L (ref 45–117)
ALT SERPL-CCNC: 31 U/L (ref 16–61)
AMMONIA PLAS-SCNC: 44 UMOL/L (ref 11–32)
ANION GAP SERPL CALC-SCNC: 6 MMOL/L (ref 3–18)
AST SERPL-CCNC: 48 U/L (ref 15–37)
BASOPHILS # BLD: 0 K/UL (ref 0–0.1)
BASOPHILS NFR BLD: 1 % (ref 0–2)
BILIRUB DIRECT SERPL-MCNC: 1.1 MG/DL (ref 0–0.2)
BILIRUB SERPL-MCNC: 3.4 MG/DL (ref 0.2–1)
BUN SERPL-MCNC: 11 MG/DL (ref 7–18)
BUN/CREAT SERPL: 12 (ref 12–20)
CALCIUM SERPL-MCNC: 8.5 MG/DL (ref 8.5–10.1)
CHLORIDE SERPL-SCNC: 103 MMOL/L (ref 100–108)
CO2 SERPL-SCNC: 27 MMOL/L (ref 21–32)
CREAT SERPL-MCNC: 0.9 MG/DL (ref 0.6–1.3)
DIFFERENTIAL METHOD BLD: ABNORMAL
EOSINOPHIL # BLD: 0.3 K/UL (ref 0–0.4)
EOSINOPHIL NFR BLD: 5 % (ref 0–5)
ERYTHROCYTE [DISTWIDTH] IN BLOOD BY AUTOMATED COUNT: 15.6 % (ref 11.6–14.5)
GLOBULIN SER CALC-MCNC: 3.7 G/DL (ref 2–4)
GLUCOSE SERPL-MCNC: 99 MG/DL (ref 74–99)
HCT VFR BLD AUTO: 35.6 % (ref 36–48)
HGB BLD-MCNC: 12.4 G/DL (ref 13–16)
INR PPP: 1.4 (ref 0.8–1.2)
LYMPHOCYTES # BLD: 0.6 K/UL (ref 0.9–3.6)
LYMPHOCYTES NFR BLD: 11 % (ref 21–52)
MCH RBC QN AUTO: 34.3 PG (ref 24–34)
MCHC RBC AUTO-ENTMCNC: 34.8 G/DL (ref 31–37)
MCV RBC AUTO: 98.3 FL (ref 74–97)
MONOCYTES # BLD: 0.6 K/UL (ref 0.05–1.2)
MONOCYTES NFR BLD: 12 % (ref 3–10)
NEUTS SEG # BLD: 3.9 K/UL (ref 1.8–8)
NEUTS SEG NFR BLD: 71 % (ref 40–73)
PLATELET # BLD AUTO: 59 K/UL (ref 135–420)
PMV BLD AUTO: 9.9 FL (ref 9.2–11.8)
POTASSIUM SERPL-SCNC: 3.9 MMOL/L (ref 3.5–5.5)
PROT SERPL-MCNC: 6.8 G/DL (ref 6.4–8.2)
PROTHROMBIN TIME: 16.5 SEC (ref 11.5–15.2)
RBC # BLD AUTO: 3.62 M/UL (ref 4.7–5.5)
SODIUM SERPL-SCNC: 136 MMOL/L (ref 136–145)
WBC # BLD AUTO: 5.4 K/UL (ref 4.6–13.2)

## 2019-02-07 PROCEDURE — 36415 COLL VENOUS BLD VENIPUNCTURE: CPT

## 2019-02-07 PROCEDURE — 85610 PROTHROMBIN TIME: CPT

## 2019-02-07 PROCEDURE — 82107 ALPHA-FETOPROTEIN L3: CPT

## 2019-02-07 PROCEDURE — 3331090002 HH PPS REVENUE DEBIT

## 2019-02-07 PROCEDURE — 80048 BASIC METABOLIC PNL TOTAL CA: CPT

## 2019-02-07 PROCEDURE — 82140 ASSAY OF AMMONIA: CPT

## 2019-02-07 PROCEDURE — 3331090001 HH PPS REVENUE CREDIT

## 2019-02-07 PROCEDURE — 80076 HEPATIC FUNCTION PANEL: CPT

## 2019-02-07 PROCEDURE — 85025 COMPLETE CBC W/AUTO DIFF WBC: CPT

## 2019-02-07 NOTE — PROGRESS NOTES
70 Nolberto Maloney MD, [address], [facility], [place]       MEJIA Guadalupe PA-C Recardo Lorenzo, ACNP-BC   MEJIA Loredo NP [facility]    at [address], 81 [facility], [facility] 22.    [phone]    FAX: [address] [place] [address]    at [address], [address], [address]    [phone]    FAX: [phone]       Patient Care Team:  Veronica Bowden MD as PCP - General (Internal Medicine)  Bassem Portillo MD as Physician (Dermatology)      Problem List  Date Reviewed: 1/30/2019          Codes Class Noted    Hepatic encephalopathy [facility]) ICD-10-CM: K72.90  ICD-9-CM: 572.2  2/1/2019        CVA (cerebral vascular accident) [facility]) ICD-10-CM: I63.9  ICD-9-CM: 434.91  1/30/2019        Elevated INR ICD-10-CM: R79.1  ICD-9-CM: 790.92  1/30/2019        Back pain ICD-10-CM: M54.9  ICD-9-CM: 724.5  1/30/2019        Chest pressure ICD-10-CM: R07.89  ICD-9-CM: 786.59  1/30/2019        Cellulitis ICD-10-CM: L03.90  ICD-9-CM: 682.9  7/24/2018        Portal hypertensive gastropathy ([facility] [address] ICD-10-CM: K76.6, K31.89  ICD-9-CM: 572.3, 537.89  12/22/2013        Cirrhosis ([facility] ICD-10-CM: K74.60  ICD-9-CM: 571.5  9/5/2011        Upper GI bleed secondary to NSAID induced gastritis - 9/2010 ICD-10-CM: K92.2  ICD-9-CM: 578.9  9/5/2011        S/P lumbar laminectomy with spinal fusion - 1992 ICD-10-CM: Z98.1  ICD-9-CM: V45.4  9/5/2011        Colon polyps ICD-10-CM: K63.5  ICD-9-CM: 211.3  9/5/2011    Overview Signed 9/5/2011 10:38 AM by Radha Albrecht MD     Last colonoscopy 9/2010             Esophageal varices (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 The HCV has been treated and cured. The active problem list, all pertinent past medical history, medications, liver histology, endoscopic studies, radiologic findings and laboratory findings related to the liver disorder were reviewed with the patient. The patient was seen here in 01/30/2019. He went to the ER after that appointment because he was complaining of blurred vision, lightheadedness, and headache. He was diagnosed with CVA. The patient reports that he was hospitalized at [facility] for a \"leg infection\" last year. He was treated with IV antibiotics for cellulitis. The patient has esophageal varices without bleeding. His previous UGI bleed was from portal gastropathy. Last EGD was performed in 10/2018. Mr. Liza Eisenberg is on step 2 diuretics. His peripheral edema is well controlled. He is currently taking lactulose and tolerating this without complaint. Mr. Liza Eisenberg participated in the Astral B clinical trial.  Unfortunately he relapsed 12 weeks post treatment. Mr. Liza Eisenberg completed twenty-four weeks of treatment with Leesa Muster in 2015. The HCV has been treated and cured. The patient suffered CVA recently and has functional limitations due to this. The patient has no complaints which can be attributed to liver disease.     The patient has not experienced fatigue, fevers, chills, shortness of breath, chest pain, pain in the right side over the liver, diffuse abdominal pain, nausea, vomiting, constipation, diarrhrea, dry eyes, dry mouth, arthralgias, myalgias, yellowing of the eyes or skin, itching, dark urine, problems concentrating, swelling of the abdomen, swelling of the lower extremities, hematemesis, or hematochezia. ALLERGIES:  Allergies   Allergen Reactions    Aspirin Other (comments)     Bleeding      Valium [Diazepam] Swelling     Current Outpatient Medications   Medication Sig    oxyCODONE-acetaminophen (PERCOCET) 5-325 mg per tablet Take 1 Tab by mouth every eight (8) hours as needed. Max Daily Amount: 3 Tabs. (Patient taking differently: Take 1 Tab by mouth every eight (8) hours as needed for Pain.)    guaiFENesin (ROBITUSSIN) 100 mg/5 mL liquid Take 5 mL by mouth every four (4) hours as needed for Cough.  atorvastatin (LIPITOR) 10 mg tablet Take 1 Tab by mouth daily.  raNITIdine (ZANTAC) 150 mg tablet Take 1 Tab by mouth daily.  traZODone (DESYREL) 50 mg tablet Take 50 mg by mouth nightly.  furosemide (LASIX) 80 mg tablet Take 1 Tab by mouth daily.  spironolactone (ALDACTONE) 100 mg tablet Take 2 Tabs by mouth daily.  lactulose (CHRONULAC) 10 gram/15 mL solution Take 20 g by mouth three (3) times daily. Indications: Hepatic Encephalopathy     No current facility-administered medications for this visit. SYSTEM REVIEW NOT RELATED TO LIVER DISEASE OR REVIEWED ABOVE:  Constitution systems: Negative for fever, chills, weight loss. Eyes: Negative for visual changes. ENT: Negative for sore throat, painful swallowing. Respiratory: Negative for cough, hemoptysis, SOB. Cardiology: Negative for chest pain, palpitations. GI:  Negative for constipation or diarrhea. : Negative for urinary frequency, dysuria, hematuria, nocturia. Skin: Negative for rash. Hematology: Negative for easy bruising, blood clots. Musculo-skelatal: Negative for back pain, muscle pain, weakness. Neurologic: Negative for headaches, dizziness, vertigo, memory problems not related to HE. Psychology: Negative for anxiety, depression. FAMILY/SOCIAL HISTORY:  The patient is . There are 3 children. The patient smokes tobacco. The patient has been abstinent from alcohol since 2007.   The patient is currently on disability. PHYSICAL EXAMINATION:    Visit Vitals  BP 90/60   Pulse 68   Temp 97.6 °F (36.4 °C) (Tympanic)   Ht 5' 11\" (1.803 m)   Wt 229 lb (103.9 kg)   SpO2 95%   BMI 31.94 kg/m²       General: No acute distress. Eyes: Sclera anicteric. ENT: No oral lesions, thyroid normal.  Nodes: No adenopathy. Skin: Spider angiomata on chest, palmar erythema. Respiratory: Lungs clear to auscultation. Cardiovascular: Regular heart rate, no murmurs, no JVD. Abdomen: Soft non-tender, liver enlarged. Spleen not palpable. No obvious ascites. Umbilical hernia present. Extremities: Trace bilateral pedal edema. No muscle wasting. No gross arthritic changes. Neurologic: Alert and oriented, cranial nerves grossly intact, no asterixsis.     LABORATORY STUDIES:  [address] Units 2/7/2019   WBC 4.6 - 13.2 K/uL 5.4   ANC 1.8 - 8.0 K/UL 3.9   HGB 13.0 - 16.0 g/dL 12.4 (L)    - 420 K/uL 59 (L)   INR 0.8 - 1.2   1.4 (H)   AST 15 - 37 U/L 48 (H)   ALT 16 - 61 U/L 31   Alk Phos 45 - 117 U/L 74   Bili, Total 0.2 - 1.0 MG/DL 3.4 (H)   Bili, Direct 0.0 - 0.2 MG/DL 1.1 (H)   Albumin 3.4 - 5.0 g/dL 3.1 (L)   BUN 7.0 - 18 MG/DL 11   Creat 0.6 - 1.3 MG/DL 0.90   Na 136 - 145 mmol/L 136   K 3.5 - 5.5 mmol/L 3.9   Cl 100 - 108 mmol/L 103   CO2 21 - 32 mmol/L 27   Glucose 74 - 99 mg/dL 99   Ammonia 11 - 32 UMOL/L 44 (H)     Liver [facility] Latest Ref Rng & Units 1/30/2019 10/29/2018   WBC 4.6 - 13.2 K/uL 5.9 4.0 (L)   ANC 1.8 - 8.0 K/UL 5.1 3.0   HGB 13.0 - 16.0 g/dL 13.9 13.3    - 420 K/uL 57 (L) 49 (L)   INR 0.8 - 1.2   1.5 (H) 1.5 (H)   AST 15 - 37 U/L 48 (H) 58 (H)   ALT 16 - 61 U/L 33 37   Alk Phos 45 - 117 U/L 81 87   Bili, Total 0.2 - 1.0 MG/DL 3.0 (H) 2.0 (H)   Bili, Direct 0.0 - 0.2 MG/DL  0.8 (H)   Albumin 3.4 - 5.0 g/dL 3.2 (L) 3.0 (L)   BUN 7.0 - 18 MG/DL 10 10   Creat 0.6 - 1.3 MG/DL 0.87 0.85   Na 136 - 145 mmol/L 136 140   K 3.5 - 5.5 mmol/L 3.8 3.6   Cl 100 - 108 mmol/L 102 102   CO2 21 - 32 mmol/L 26 26   Glucose 74 - 99 mg/dL 113 (H) 87   Ammonia 11 - 32 UMOL/L 48 (H) 34 (H)     Cancer Screening Latest Ref Rng & Units 2/7/2019   AFP, Serum 0.0 - 8.0 ng/mL 9.8 (H)   AFP-L3% 0.0 - 9.9 % 8.2     Cancer Screening Latest Ref Rng & Units 1/30/2019 10/29/2018 9/5/2018   AFP, Serum 0.0 - 8.0 ng/mL 10.8 (H) 9.0 (H) 9.5 (H)   AFP-L3% 0.0 - 9.9 % 7.6 7.0 6.1     SEROLOGIES:  12/10:  HAV total positive, HB surface antigen negative, anti-HB surface negative, HCV genotype 1, HCV RNA log 5.5,    7/2011: Ferritin 73, iron saturation 57%. LIVER HISTOLOGY:  07/2018. TRANSIENT HEPATIC ELASTOGRAPHY:   E Range: 9.44-22.22 kPa  E Mean: 14.32 kPa  E Median: 14.68 kPa  E Std: 3.91 kPa    ENDOSCOPIC PROCEDURES:  9/2010:  Colonoscopy while hospitalized in [facility] - reported to demonstrate colonic polyps. 9/2010:  EGD while hospitalized in [facility] - reported to demonstrate severe gastritis from NSAIDs with small esophageal varices. 2/2011: EGD by [facility]. Esophageal varicies. Banding performed. 11/2011:  EGD by [facility]. Severe portal gastropathy. No varices. 4/2012:  EGD by [facility]:  Grade 2 esophageal varices. Banding performed. Severe hypertensive portal gastropathy. 8/2012:  EGD per [facility]. Small esophageal varices. Severe portal hypertensive gastropathy, gastritis of the antrum, no gastric varices. Repeat in 6 months. 10/2013. EGD by Dr Rome Rothman. Small varices with stigmata of bleeding. Portal gastropathy. 07/2014:  EGD per [facility]. 2 medium esophageal varices. Banding performed. Moderate portal hypertensive gastropathy. Repeat in one year. 9/1/2015: EGD per [facility]. Duodenal ulcer present. H. Pylori negative. Portal hypertensive gastropathy. Esophageal varices present. Four bands placed. Repeat in 6 months. 11/2015:  EGD per Dr. German Walls. Moderate portal gastropathy. Grade 1 esophageal varices. No gastric varices noted.    11/2015:  Colonoscopy per Dr. Julia Armando. Mild diverticulosis. 11/2016:  EGD per Dr. Edwin Cyr. Small esophageal varices. Severe hypertensive portal gastropathy. Repeat in 6 months. 01/2018. EGD by Dr. Dominga Ocampo. No report available. 10/2018. EGD by Dr. Yenny Tyson. Normal esophagus. Pathology negative. Repeat in 2 years. RADIOLOGY:  11/2010: Ultrasound of the liver. Echogenic consistent with cirrhosis. No liver mass lesions. No ascites. 2/2011: Ultrasound of the liver. Changes consistent with cirrhosis. No liver mass lesions. No ascites. No portal vein thrombosis. 11/2011: Ultrasound of the liver. Echogenic consistent with chronic liver disease, cirrhosis. No liver mass lesions. No ascites. 04/12:  CT of abdomen and pelvis - hepatic cirrhosis with portal hypertension with scattered upper abdominal collateralzation and varices. Likely recanalization of the umbilical artery. No mass noted. 1/2015:  Ultrasound of liver. Echogenic consistent with cirrhosis. No liver mass lesions. No dilated bile ducts. No ascites. 8/2015:  CT of the abdomen and pelvis. Coarsened, micronodular surface of the liver with widening of the interlobar fissure. No intrahepatic biliary ductal dilation. Stable enlargement of the portal vein with recanalization of the umbilical vein. Paraesophageal varices. Grossly stable splenomegaly  1/2016:  Ultrasound of liver. Echogenic consistent with cirrhosis. No liver mass lesions. No dilated bile ducts. No ascites. 11/2016:  Ultrasound of the liver. Echogenic consistent with cirrhosis. No liver mass lesions. No dilated bile ducts. No ascites. 05/2018. Abdominal ultrasound. Coarsened hepatic echogenicity consistent with hepatic parenchymal disease. No focal lesions or biliary dilatation demonstrated. 07/2018. Ultrasound of the liver. Cirrhotic hepatic morphology without focal hepatic lesion. 12/2018. Ultrasound of the liver. Stable right upper abdominal ultrasound, since 6-2018. Findings consistent with cirrhosis/chronic hepatocellular disease. Recanalized paraumbilical left hepatic vein. No perihepatic ascites. but without evidence of discrete focal mass  01/2019. Brain MRI. \"Probable recent acute/subacute infarct involving the anterior aspect of the left temporal stem. No evidence of associated hemorrhage or mass effect. \"    OTHER TESTING:  Not performed    ASSESSMENT AND PLAN:   Cirrhosis secondary to HCV. The HCV has been treated and cured. The most recent laboratory studies indicate that the AST is elevated, the ALT is normal, alkaline phosphatase is normal, tests of hepatic synthetic and metabolic function are depressed, and the platelet count is depressed. Will perform laboratory testing to monitor liver function and degree of liver injury. This will include hepatic panel, a CBC w/ diff, a BMP, a PT/INR, an ammonia level, and an AFP-L3%. Complications of cirrhosis were discussed in detail. We discussed thrombocytopenia, portal hypertension, varices, GI bleeding, peripheral edema, ascites, hepatic encephalopathy, and hepatocellular carcinoma. We discussed the need for follow ups on a regular basis, at 3 month intervals to monitor for complications. We discussed the need for every 6 month liver imaging studies. HCV treatment. Mr. Alejo Olson completed twenty-four weeks of treatment with Malachjune Breslow with good toleration in 09/2016. He remained HCV RNA undetectable one year post treatment. CVA. Follow with neurology as directed. Upper GI bleed appears to have been due to portal gastropathy. Repeat EGD was performed in 10/2018. Normal esophagus. Some gastritis of the atrium. Pathology is negative. Repeat EGD in 10/2020. Hepatic encephalopathy. I have ordered an ammonia level today. The patient was instructed not to operate a motor vehicle because of HE which poses an increased risk for MVA.   Ammonia level elevated labs on from 09/2018 was 157 but had improved to 34 on labs from 10/31/2018. He reports that he is not taking the lactulose because \"I am going to the bathroom\". I encouraged the patietn to resume the lactulose at least once daily for covert HE. Lower extremity. The patient is currently on step 2 diuretics. Continue this dose. Edema well controlled. BUN/CRT are WNL. I have spoken to him about the importance of salt restrictions. The patient was counseled regarding alcohol consumption. Vaccination for viral hepatitis B is recommended since the patient has no serologic evidence of previous exposure or vaccination with immunity. Vaccination for viral hepatitis A is not required. The patient has serologic evidence of prior exposure or vaccination with immunity. [address] screening will be performed. AFP ordered today. Abdominal ultrasound ordered. This will be performed with shear wave elastography. Elastography  can assess liver fibrosis and determine if a patient has advanced fibrosis or cirrhosis without the need for liver biopsy. Thrombocytopenia secondary to cirrhosis from chronic HCV. No treatment necessary. [facility] 87 in 3 months. All of the above issues were discussed with the patient. All questions were answered. The patient expressed a clear understanding of the above.     Yudelka Mosley, JOSEFA-C  [facility] [place] [facility]  [address], [address] [facility], [address] [place] [address]   [phone]

## 2019-02-08 ENCOUNTER — HOME CARE VISIT (OUTPATIENT)
Dept: SCHEDULING | Facility: HOME HEALTH | Age: 64
End: 2019-02-08
Payer: MEDICARE

## 2019-02-08 LAB
AFP L3 MFR SERPL: 8.2 % (ref 0–9.9)
AFP SERPL-MCNC: 9.8 NG/ML (ref 0–8)

## 2019-02-08 PROCEDURE — G0495 RN CARE TRAIN/EDU IN HH: HCPCS

## 2019-02-08 PROCEDURE — 3331090001 HH PPS REVENUE CREDIT

## 2019-02-08 PROCEDURE — 3331090002 HH PPS REVENUE DEBIT

## 2019-02-08 PROCEDURE — G0152 HHCP-SERV OF OT,EA 15 MIN: HCPCS

## 2019-02-08 PROCEDURE — G0157 HHC PT ASSISTANT EA 15: HCPCS

## 2019-02-09 VITALS
OXYGEN SATURATION: 98 % | DIASTOLIC BLOOD PRESSURE: 69 MMHG | TEMPERATURE: 97.3 F | HEART RATE: 73 BPM | SYSTOLIC BLOOD PRESSURE: 103 MMHG | RESPIRATION RATE: 14 BRPM

## 2019-02-09 PROCEDURE — 3331090001 HH PPS REVENUE CREDIT

## 2019-02-09 PROCEDURE — 3331090002 HH PPS REVENUE DEBIT

## 2019-02-10 PROCEDURE — 3331090002 HH PPS REVENUE DEBIT

## 2019-02-10 PROCEDURE — 3331090001 HH PPS REVENUE CREDIT

## 2019-02-11 VITALS — OXYGEN SATURATION: 98 % | DIASTOLIC BLOOD PRESSURE: 69 MMHG | SYSTOLIC BLOOD PRESSURE: 103 MMHG

## 2019-02-11 PROCEDURE — 3331090001 HH PPS REVENUE CREDIT

## 2019-02-11 PROCEDURE — 3331090002 HH PPS REVENUE DEBIT

## 2019-02-12 ENCOUNTER — HOME CARE VISIT (OUTPATIENT)
Dept: SCHEDULING | Facility: HOME HEALTH | Age: 64
End: 2019-02-12
Payer: MEDICARE

## 2019-02-12 PROCEDURE — 3331090002 HH PPS REVENUE DEBIT

## 2019-02-12 PROCEDURE — G0152 HHCP-SERV OF OT,EA 15 MIN: HCPCS

## 2019-02-12 PROCEDURE — 3331090001 HH PPS REVENUE CREDIT

## 2019-02-13 ENCOUNTER — HOME CARE VISIT (OUTPATIENT)
Dept: SCHEDULING | Facility: HOME HEALTH | Age: 64
End: 2019-02-13
Payer: MEDICARE

## 2019-02-13 VITALS
SYSTOLIC BLOOD PRESSURE: 122 MMHG | RESPIRATION RATE: 14 BRPM | TEMPERATURE: 97.6 F | OXYGEN SATURATION: 95 % | HEART RATE: 68 BPM | DIASTOLIC BLOOD PRESSURE: 79 MMHG

## 2019-02-13 PROCEDURE — 3331090002 HH PPS REVENUE DEBIT

## 2019-02-13 PROCEDURE — 3331090001 HH PPS REVENUE CREDIT

## 2019-02-13 PROCEDURE — G0157 HHC PT ASSISTANT EA 15: HCPCS

## 2019-02-13 PROCEDURE — G0495 RN CARE TRAIN/EDU IN HH: HCPCS

## 2019-02-14 ENCOUNTER — HOME CARE VISIT (OUTPATIENT)
Dept: SCHEDULING | Facility: HOME HEALTH | Age: 64
End: 2019-02-14
Payer: MEDICARE

## 2019-02-14 ENCOUNTER — HOSPITAL ENCOUNTER (OUTPATIENT)
Dept: MRI IMAGING | Age: 64
Discharge: HOME | End: 2019-02-14
Attending: PHYSICIAN ASSISTANT
Payer: MEDICARE

## 2019-02-14 DIAGNOSIS — H53.2 DOUBLE VISION: ICD-10-CM

## 2019-02-14 PROCEDURE — 3331090001 HH PPS REVENUE CREDIT

## 2019-02-14 PROCEDURE — 3331090002 HH PPS REVENUE DEBIT

## 2019-02-14 PROCEDURE — G0152 HHCP-SERV OF OT,EA 15 MIN: HCPCS

## 2019-02-14 PROCEDURE — G0157 HHC PT ASSISTANT EA 15: HCPCS

## 2019-02-14 PROCEDURE — 70551 MRI BRAIN STEM W/O DYE: CPT

## 2019-02-15 VITALS — SYSTOLIC BLOOD PRESSURE: 120 MMHG | OXYGEN SATURATION: 96 % | DIASTOLIC BLOOD PRESSURE: 78 MMHG | HEART RATE: 90 BPM

## 2019-02-15 PROCEDURE — 3331090002 HH PPS REVENUE DEBIT

## 2019-02-15 PROCEDURE — 3331090001 HH PPS REVENUE CREDIT

## 2019-02-16 PROCEDURE — 3331090001 HH PPS REVENUE CREDIT

## 2019-02-16 PROCEDURE — 3331090002 HH PPS REVENUE DEBIT

## 2019-02-17 ENCOUNTER — HOME CARE VISIT (OUTPATIENT)
Dept: HOME HEALTH SERVICES | Facility: HOME HEALTH | Age: 64
End: 2019-02-17
Payer: MEDICARE

## 2019-02-17 PROCEDURE — 3331090002 HH PPS REVENUE DEBIT

## 2019-02-17 PROCEDURE — 3331090001 HH PPS REVENUE CREDIT

## 2019-02-18 PROCEDURE — 3331090001 HH PPS REVENUE CREDIT

## 2019-02-18 PROCEDURE — 3331090002 HH PPS REVENUE DEBIT

## 2019-02-19 PROCEDURE — 3331090002 HH PPS REVENUE DEBIT

## 2019-02-19 PROCEDURE — 3331090001 HH PPS REVENUE CREDIT

## 2019-02-20 PROCEDURE — 3331090001 HH PPS REVENUE CREDIT

## 2019-02-20 PROCEDURE — 3331090002 HH PPS REVENUE DEBIT

## 2019-02-21 PROCEDURE — 3331090002 HH PPS REVENUE DEBIT

## 2019-02-21 PROCEDURE — 3331090001 HH PPS REVENUE CREDIT

## 2019-02-22 PROCEDURE — 3331090002 HH PPS REVENUE DEBIT

## 2019-02-22 PROCEDURE — 3331090001 HH PPS REVENUE CREDIT

## 2019-02-23 PROCEDURE — 3331090001 HH PPS REVENUE CREDIT

## 2019-02-23 PROCEDURE — 3331090002 HH PPS REVENUE DEBIT

## 2019-02-24 PROCEDURE — 3331090001 HH PPS REVENUE CREDIT

## 2019-02-24 PROCEDURE — 3331090002 HH PPS REVENUE DEBIT

## 2019-02-25 PROCEDURE — 3331090001 HH PPS REVENUE CREDIT

## 2019-02-25 PROCEDURE — 3331090002 HH PPS REVENUE DEBIT

## 2019-02-26 PROCEDURE — 3331090002 HH PPS REVENUE DEBIT

## 2019-02-26 PROCEDURE — 3331090001 HH PPS REVENUE CREDIT

## 2019-02-27 PROCEDURE — 3331090002 HH PPS REVENUE DEBIT

## 2019-02-27 PROCEDURE — 3331090001 HH PPS REVENUE CREDIT

## 2019-02-28 PROCEDURE — 3331090002 HH PPS REVENUE DEBIT

## 2019-02-28 PROCEDURE — 3331090001 HH PPS REVENUE CREDIT

## 2019-03-01 PROCEDURE — 3331090001 HH PPS REVENUE CREDIT

## 2019-03-01 PROCEDURE — 3331090002 HH PPS REVENUE DEBIT

## 2019-03-02 PROCEDURE — 3331090002 HH PPS REVENUE DEBIT

## 2019-03-02 PROCEDURE — 3331090001 HH PPS REVENUE CREDIT

## 2019-03-03 PROCEDURE — 3331090001 HH PPS REVENUE CREDIT

## 2019-03-03 PROCEDURE — 3331090002 HH PPS REVENUE DEBIT

## 2019-03-04 PROCEDURE — 3331090001 HH PPS REVENUE CREDIT

## 2019-03-04 PROCEDURE — 3331090002 HH PPS REVENUE DEBIT

## 2019-03-05 PROCEDURE — 3331090002 HH PPS REVENUE DEBIT

## 2019-03-05 PROCEDURE — 3331090001 HH PPS REVENUE CREDIT

## 2019-03-06 PROCEDURE — 3331090001 HH PPS REVENUE CREDIT

## 2019-03-06 PROCEDURE — 3331090002 HH PPS REVENUE DEBIT

## 2019-03-07 PROCEDURE — 3331090001 HH PPS REVENUE CREDIT

## 2019-03-07 PROCEDURE — 3331090002 HH PPS REVENUE DEBIT

## 2019-03-08 PROCEDURE — 3331090002 HH PPS REVENUE DEBIT

## 2019-03-08 PROCEDURE — 3331090001 HH PPS REVENUE CREDIT

## 2019-03-09 PROCEDURE — 3331090001 HH PPS REVENUE CREDIT

## 2019-03-09 PROCEDURE — 3331090002 HH PPS REVENUE DEBIT

## 2019-03-10 PROCEDURE — 3331090002 HH PPS REVENUE DEBIT

## 2019-03-10 PROCEDURE — 3331090001 HH PPS REVENUE CREDIT

## 2019-03-11 PROCEDURE — 3331090002 HH PPS REVENUE DEBIT

## 2019-03-11 PROCEDURE — 3331090001 HH PPS REVENUE CREDIT

## 2019-03-12 PROCEDURE — 3331090002 HH PPS REVENUE DEBIT

## 2019-03-12 PROCEDURE — 3331090001 HH PPS REVENUE CREDIT

## 2019-03-13 PROCEDURE — 3331090001 HH PPS REVENUE CREDIT

## 2019-03-13 PROCEDURE — 3331090002 HH PPS REVENUE DEBIT

## 2019-03-14 PROCEDURE — 3331090001 HH PPS REVENUE CREDIT

## 2019-03-14 PROCEDURE — 3331090002 HH PPS REVENUE DEBIT

## 2019-03-15 PROCEDURE — 3331090001 HH PPS REVENUE CREDIT

## 2019-03-15 PROCEDURE — 3331090002 HH PPS REVENUE DEBIT

## 2019-03-16 PROCEDURE — 3331090002 HH PPS REVENUE DEBIT

## 2019-03-16 PROCEDURE — 3331090001 HH PPS REVENUE CREDIT

## 2019-03-17 PROCEDURE — 3331090002 HH PPS REVENUE DEBIT

## 2019-03-17 PROCEDURE — 3331090001 HH PPS REVENUE CREDIT

## 2019-03-18 PROCEDURE — 3331090002 HH PPS REVENUE DEBIT

## 2019-03-18 PROCEDURE — 3331090001 HH PPS REVENUE CREDIT

## 2019-03-19 PROCEDURE — 3331090001 HH PPS REVENUE CREDIT

## 2019-03-19 PROCEDURE — 3331090002 HH PPS REVENUE DEBIT

## 2019-03-20 PROCEDURE — 3331090001 HH PPS REVENUE CREDIT

## 2019-03-20 PROCEDURE — 3331090002 HH PPS REVENUE DEBIT

## 2019-03-21 ENCOUNTER — HOME CARE VISIT (OUTPATIENT)
Dept: HOME HEALTH SERVICES | Facility: HOME HEALTH | Age: 64
End: 2019-03-21
Payer: MEDICARE

## 2019-03-21 PROCEDURE — 3331090002 HH PPS REVENUE DEBIT

## 2019-03-21 PROCEDURE — 3331090001 HH PPS REVENUE CREDIT

## 2019-03-22 ENCOUNTER — HOME CARE VISIT (OUTPATIENT)
Dept: SCHEDULING | Facility: HOME HEALTH | Age: 64
End: 2019-03-22
Payer: MEDICARE

## 2019-03-22 PROCEDURE — 3331090002 HH PPS REVENUE DEBIT

## 2019-03-22 PROCEDURE — 3331090001 HH PPS REVENUE CREDIT

## 2019-03-22 PROCEDURE — G0299 HHS/HOSPICE OF RN EA 15 MIN: HCPCS

## 2019-03-23 VITALS
DIASTOLIC BLOOD PRESSURE: 60 MMHG | OXYGEN SATURATION: 96 % | WEIGHT: 225 LBS | HEART RATE: 66 BPM | BODY MASS INDEX: 31.5 KG/M2 | SYSTOLIC BLOOD PRESSURE: 101 MMHG | TEMPERATURE: 97.2 F | RESPIRATION RATE: 14 BRPM | HEIGHT: 71 IN

## 2019-03-23 PROCEDURE — 3331090002 HH PPS REVENUE DEBIT

## 2019-03-23 PROCEDURE — 3331090001 HH PPS REVENUE CREDIT

## 2019-03-24 PROCEDURE — 3331090002 HH PPS REVENUE DEBIT

## 2019-03-24 PROCEDURE — 3331090001 HH PPS REVENUE CREDIT

## 2019-03-25 ENCOUNTER — HOME CARE VISIT (OUTPATIENT)
Dept: HOME HEALTH SERVICES | Facility: HOME HEALTH | Age: 64
End: 2019-03-25
Payer: MEDICARE

## 2019-03-25 ENCOUNTER — HOME CARE VISIT (OUTPATIENT)
Dept: SCHEDULING | Facility: HOME HEALTH | Age: 64
End: 2019-03-25
Payer: MEDICARE

## 2019-03-25 VITALS
SYSTOLIC BLOOD PRESSURE: 110 MMHG | OXYGEN SATURATION: 97 % | RESPIRATION RATE: 17 BRPM | HEART RATE: 71 BPM | DIASTOLIC BLOOD PRESSURE: 71 MMHG | TEMPERATURE: 98.6 F

## 2019-03-25 PROCEDURE — G0151 HHCP-SERV OF PT,EA 15 MIN: HCPCS

## 2019-03-25 PROCEDURE — G0152 HHCP-SERV OF OT,EA 15 MIN: HCPCS

## 2019-03-25 PROCEDURE — 3331090002 HH PPS REVENUE DEBIT

## 2019-03-25 PROCEDURE — 3331090001 HH PPS REVENUE CREDIT

## 2019-03-26 ENCOUNTER — HOME CARE VISIT (OUTPATIENT)
Dept: SCHEDULING | Facility: HOME HEALTH | Age: 64
End: 2019-03-26
Payer: MEDICARE

## 2019-03-26 VITALS
TEMPERATURE: 98.1 F | DIASTOLIC BLOOD PRESSURE: 67 MMHG | SYSTOLIC BLOOD PRESSURE: 111 MMHG | OXYGEN SATURATION: 95 % | RESPIRATION RATE: 16 BRPM | HEART RATE: 70 BPM

## 2019-03-26 VITALS — DIASTOLIC BLOOD PRESSURE: 68 MMHG | SYSTOLIC BLOOD PRESSURE: 102 MMHG | HEART RATE: 64 BPM | OXYGEN SATURATION: 96 %

## 2019-03-26 PROCEDURE — 3331090002 HH PPS REVENUE DEBIT

## 2019-03-26 PROCEDURE — 3331090001 HH PPS REVENUE CREDIT

## 2019-03-26 PROCEDURE — G0299 HHS/HOSPICE OF RN EA 15 MIN: HCPCS

## 2019-03-27 ENCOUNTER — HOME CARE VISIT (OUTPATIENT)
Dept: SCHEDULING | Facility: HOME HEALTH | Age: 64
End: 2019-03-27
Payer: MEDICARE

## 2019-03-27 VITALS
OXYGEN SATURATION: 94 % | TEMPERATURE: 97.6 F | HEART RATE: 63 BPM | SYSTOLIC BLOOD PRESSURE: 94 MMHG | DIASTOLIC BLOOD PRESSURE: 60 MMHG

## 2019-03-27 PROCEDURE — 3331090001 HH PPS REVENUE CREDIT

## 2019-03-27 PROCEDURE — G0153 HHCP-SVS OF S/L PATH,EA 15MN: HCPCS

## 2019-03-27 PROCEDURE — 3331090002 HH PPS REVENUE DEBIT

## 2019-03-28 PROCEDURE — 3331090001 HH PPS REVENUE CREDIT

## 2019-03-28 PROCEDURE — 3331090002 HH PPS REVENUE DEBIT

## 2019-03-29 ENCOUNTER — HOME CARE VISIT (OUTPATIENT)
Dept: SCHEDULING | Facility: HOME HEALTH | Age: 64
End: 2019-03-29
Payer: MEDICARE

## 2019-03-29 VITALS
SYSTOLIC BLOOD PRESSURE: 98 MMHG | OXYGEN SATURATION: 94 % | HEART RATE: 71 BPM | DIASTOLIC BLOOD PRESSURE: 57 MMHG | TEMPERATURE: 99.6 F

## 2019-03-29 PROCEDURE — 3331090002 HH PPS REVENUE DEBIT

## 2019-03-29 PROCEDURE — 3331090001 HH PPS REVENUE CREDIT

## 2019-03-29 PROCEDURE — G0153 HHCP-SVS OF S/L PATH,EA 15MN: HCPCS

## 2019-03-30 PROCEDURE — 3331090002 HH PPS REVENUE DEBIT

## 2019-03-30 PROCEDURE — 3331090001 HH PPS REVENUE CREDIT

## 2019-03-31 PROCEDURE — 3331090002 HH PPS REVENUE DEBIT

## 2019-03-31 PROCEDURE — 3331090001 HH PPS REVENUE CREDIT

## 2019-04-01 ENCOUNTER — HOME CARE VISIT (OUTPATIENT)
Dept: SCHEDULING | Facility: HOME HEALTH | Age: 64
End: 2019-04-01
Payer: MEDICARE

## 2019-04-01 VITALS
DIASTOLIC BLOOD PRESSURE: 60 MMHG | OXYGEN SATURATION: 97 % | TEMPERATURE: 99.3 F | HEART RATE: 73 BPM | SYSTOLIC BLOOD PRESSURE: 98 MMHG

## 2019-04-01 PROCEDURE — 3331090001 HH PPS REVENUE CREDIT

## 2019-04-01 PROCEDURE — 3331090002 HH PPS REVENUE DEBIT

## 2019-04-01 PROCEDURE — G0153 HHCP-SVS OF S/L PATH,EA 15MN: HCPCS

## 2019-04-02 ENCOUNTER — HOME CARE VISIT (OUTPATIENT)
Dept: SCHEDULING | Facility: HOME HEALTH | Age: 64
End: 2019-04-02
Payer: MEDICARE

## 2019-04-02 PROCEDURE — 3331090002 HH PPS REVENUE DEBIT

## 2019-04-02 PROCEDURE — 3331090001 HH PPS REVENUE CREDIT

## 2019-04-02 PROCEDURE — G0299 HHS/HOSPICE OF RN EA 15 MIN: HCPCS

## 2019-04-02 PROCEDURE — 3331090003 HH PPS REVENUE ADJ

## 2019-04-03 VITALS
HEART RATE: 71 BPM | RESPIRATION RATE: 14 BRPM | SYSTOLIC BLOOD PRESSURE: 120 MMHG | OXYGEN SATURATION: 97 % | DIASTOLIC BLOOD PRESSURE: 75 MMHG | TEMPERATURE: 97.4 F

## 2019-04-03 PROCEDURE — 3331090002 HH PPS REVENUE DEBIT

## 2019-04-03 PROCEDURE — 3331090001 HH PPS REVENUE CREDIT

## 2019-04-04 PROCEDURE — 3331090001 HH PPS REVENUE CREDIT

## 2019-04-04 PROCEDURE — 3331090002 HH PPS REVENUE DEBIT

## 2019-05-01 ENCOUNTER — OFFICE VISIT (OUTPATIENT)
Dept: HEMATOLOGY | Age: 64
End: 2019-05-01

## 2019-05-01 ENCOUNTER — HOSPITAL ENCOUNTER (OUTPATIENT)
Dept: LAB | Age: 64
Discharge: HOME | End: 2019-05-01
Payer: MEDICARE

## 2019-05-01 VITALS
RESPIRATION RATE: 16 BRPM | OXYGEN SATURATION: 98 % | HEIGHT: 71 IN | DIASTOLIC BLOOD PRESSURE: 67 MMHG | BODY MASS INDEX: 30.52 KG/M2 | SYSTOLIC BLOOD PRESSURE: 105 MMHG | WEIGHT: 218 LBS | HEART RATE: 73 BPM | TEMPERATURE: 98.9 F

## 2019-05-01 DIAGNOSIS — K74.60 CIRRHOSIS OF LIVER WITHOUT ASCITES, UNSPECIFIED HEPATIC CIRRHOSIS TYPE (HCC): ICD-10-CM

## 2019-05-01 DIAGNOSIS — K74.60 CIRRHOSIS OF LIVER WITHOUT ASCITES, UNSPECIFIED HEPATIC CIRRHOSIS TYPE (HCC): Primary | ICD-10-CM

## 2019-05-01 LAB
ALBUMIN SERPL-MCNC: 2.6 G/DL (ref 3.4–5)
ALBUMIN/GLOB SERPL: 0.8 {RATIO} (ref 0.8–1.7)
ALP SERPL-CCNC: 110 U/L (ref 45–117)
ALT SERPL-CCNC: 42 U/L (ref 16–61)
AMMONIA PLAS-SCNC: 89 UMOL/L (ref 11–32)
ANION GAP SERPL CALC-SCNC: 4 MMOL/L (ref 3–18)
AST SERPL-CCNC: 56 U/L (ref 15–37)
BASOPHILS # BLD: 0 K/UL (ref 0–0.1)
BASOPHILS NFR BLD: 0 % (ref 0–2)
BILIRUB DIRECT SERPL-MCNC: 1 MG/DL (ref 0–0.2)
BILIRUB SERPL-MCNC: 2.8 MG/DL (ref 0.2–1)
BUN SERPL-MCNC: 10 MG/DL (ref 7–18)
BUN/CREAT SERPL: 14 (ref 12–20)
CALCIUM SERPL-MCNC: 8.2 MG/DL (ref 8.5–10.1)
CHLORIDE SERPL-SCNC: 109 MMOL/L (ref 100–108)
CO2 SERPL-SCNC: 27 MMOL/L (ref 21–32)
CREAT SERPL-MCNC: 0.69 MG/DL (ref 0.6–1.3)
DIFFERENTIAL METHOD BLD: ABNORMAL
EOSINOPHIL # BLD: 0.2 K/UL (ref 0–0.4)
EOSINOPHIL NFR BLD: 5 % (ref 0–5)
ERYTHROCYTE [DISTWIDTH] IN BLOOD BY AUTOMATED COUNT: 14.5 % (ref 11.6–14.5)
GLOBULIN SER CALC-MCNC: 3.3 G/DL (ref 2–4)
GLUCOSE SERPL-MCNC: 213 MG/DL (ref 74–99)
HCT VFR BLD AUTO: 36.7 % (ref 36–48)
HGB BLD-MCNC: 12.7 G/DL (ref 13–16)
INR PPP: 1.6 (ref 0.8–1.2)
LYMPHOCYTES # BLD: 0.3 K/UL (ref 0.9–3.6)
LYMPHOCYTES NFR BLD: 8 % (ref 21–52)
MCH RBC QN AUTO: 34.7 PG (ref 24–34)
MCHC RBC AUTO-ENTMCNC: 34.6 G/DL (ref 31–37)
MCV RBC AUTO: 100.3 FL (ref 74–97)
MONOCYTES # BLD: 0.4 K/UL (ref 0.05–1.2)
MONOCYTES NFR BLD: 11 % (ref 3–10)
NEUTS SEG # BLD: 2.6 K/UL (ref 1.8–8)
NEUTS SEG NFR BLD: 76 % (ref 40–73)
PLATELET # BLD AUTO: 34 K/UL (ref 135–420)
PMV BLD AUTO: 9.7 FL (ref 9.2–11.8)
POTASSIUM SERPL-SCNC: 4.1 MMOL/L (ref 3.5–5.5)
PROT SERPL-MCNC: 5.9 G/DL (ref 6.4–8.2)
PROTHROMBIN TIME: 19.1 SEC (ref 11.5–15.2)
RBC # BLD AUTO: 3.66 M/UL (ref 4.7–5.5)
SODIUM SERPL-SCNC: 140 MMOL/L (ref 136–145)
WBC # BLD AUTO: 3.3 K/UL (ref 4.6–13.2)

## 2019-05-01 PROCEDURE — 82140 ASSAY OF AMMONIA: CPT

## 2019-05-01 PROCEDURE — 36415 COLL VENOUS BLD VENIPUNCTURE: CPT

## 2019-05-01 PROCEDURE — 80048 BASIC METABOLIC PNL TOTAL CA: CPT

## 2019-05-01 PROCEDURE — 82107 ALPHA-FETOPROTEIN L3: CPT

## 2019-05-01 PROCEDURE — 85610 PROTHROMBIN TIME: CPT

## 2019-05-01 PROCEDURE — 85025 COMPLETE CBC W/AUTO DIFF WBC: CPT

## 2019-05-01 PROCEDURE — 80076 HEPATIC FUNCTION PANEL: CPT

## 2019-05-01 RX ORDER — SPIRONOLACTONE 50 MG/1
TABLET, FILM COATED ORAL DAILY
COMMUNITY
End: 2019-09-25 | Stop reason: SDUPTHER

## 2019-05-01 RX ORDER — ESCITALOPRAM OXALATE 10 MG/1
10 TABLET ORAL DAILY
COMMUNITY
Start: 2019-04-19 | End: 2019-10-16

## 2019-05-01 RX ORDER — FUROSEMIDE 40 MG/1
40 TABLET ORAL DAILY
COMMUNITY
Start: 2010-11-29 | End: 2019-09-25 | Stop reason: SDUPTHER

## 2019-05-01 RX ORDER — PANTOPRAZOLE SODIUM 40 MG/1
40 TABLET, DELAYED RELEASE ORAL DAILY
Qty: 90 TAB | Refills: 3 | Status: SHIPPED | OUTPATIENT
Start: 2019-05-01 | End: 2020-06-08

## 2019-05-01 NOTE — PROGRESS NOTES
70 Nolberto Maloney MD, FACP, [facility], [place]       Jorge Bejarano, DAISY Russo, ROELP-BC   Tom Chávez, MEJIA [facility] 136    at [place] [id number] [facility], [address] [place] [address], [facility] 22.    [phone]    FAX: [address] [place] [address]    at [address], [address], [address] [place] [address],[address]    [address] [place] [address] Street: [phone]       Patient Care Team:  Jaiden Cast MD as PCP - General (Internal Medicine)  Gen Bauer MD (Neurology)      Problem List  Date Reviewed: 1/30/2019          Codes Class Noted    Hepatic encephalopathy [facility]) ICD-10-CM: K72.90  ICD-9-CM: 572.2  2/1/2019        CVA (cerebral vascular accident) [facility]) ICD-10-CM: I63.9  ICD-9-CM: 434.91  1/30/2019        Elevated INR ICD-10-CM: R79.1  ICD-9-CM: 790.92  1/30/2019        Back pain ICD-10-CM: M54.9  ICD-9-CM: 724.5  1/30/2019        Chest pressure ICD-10-CM: R07.89  ICD-9-CM: 786.59  1/30/2019        Cellulitis ICD-10-CM: L03.90  ICD-9-CM: 682.9  7/24/2018        Portal hypertensive gastropathy ([facility] [address] ICD-10-CM: K76.6, K31.89  ICD-9-CM: 572.3, 537.89  12/22/2013        Cirrhosis ([facility] [address] ICD-10-CM: K74.60  ICD-9-CM: 571.5  9/5/2011        Upper GI bleed secondary to NSAID induced gastritis - 9/2010 ICD-10-CM: K92.2  ICD-9-CM: 578.9  9/5/2011        S/P lumbar laminectomy with spinal fusion - 1992 ICD-10-CM: Z98.1  ICD-9-CM: V45.4  9/5/2011        Colon polyps ICD-10-CM: K63.5  ICD-9-CM: 211.3  9/5/2011    Overview Signed 9/5/2011 10:38 AM by Wilder Pearce MD     Last colonoscopy 9/2010             Esophageal varices ([facility] [address] ICD-10-CM: I85.00  ICD-9-CM: 456.1  9/5/2011        Thrombocytopenia secondary to cirrhosis ICD-10-CM: D69.6  ICD-9-CM: 287.5  9/5/2011 Chronic hepatitis C genotype 1 ICD-10-CM: B18.2  ICD-9-CM: 070.54  Unknown              Frank Route returns to the [facility] for 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 The HCV has been treated and cured. The active problem list, all pertinent past medical history, medications, liver histology, endoscopic studies, radiologic findings and laboratory findings related to the liver disorder were reviewed with the patient. The patient was seen here in 01/30/2019. He went to the ER after that appointment because he was complaining of blurred vision, lightheadedness, and headache. He was diagnosed with CVA. Mr. Cher Franco suffered a severe ischemic CVA on 02/15/2019 and was at [facility] for 5 days. He then was discharged to [facility] where he underwent rehab for 2 weeks. After that, he spent another 2 weeks at [facility]. The patient has esophageal varices without bleeding. His previous UGI bleed was from portal gastropathy. Last EGD was performed in 10/2018. Mr. Cher Franco peripheral edema is well controlled with 40 mg lasix and 50 mg aldactone daily. He is currently taking lactulose and tolerating this without complaint. Mr. Cher Franco participated in the Astral B clinical trial.  Unfortunately he relapsed 12 weeks post treatment. Mr. Cher Franco completed twenty-four weeks of treatment with Katcelment[facility] in 2015. The HCV has been treated and cured. The patient has no complaints which can be attributed to liver disease.     The patient has not experienced fatigue, fevers, chills, shortness of breath, chest pain, pain in the right side over the liver, diffuse abdominal pain, nausea, vomiting, constipation, diarrhrea, dry eyes, dry mouth, arthralgias, myalgias, yellowing of the eyes or skin, itching, dark urine, problems concentrating, swelling of the abdomen, swelling of the lower extremities, hematemesis, or hematochezia. ALLERGIES:  Allergies   Allergen Reactions    Aspirin Other (comments)     Bleeding      Valium [Diazepam] Swelling     Current Outpatient Medications   Medication Sig    furosemide (LASIX) 40 mg tablet Take 40 mg by mouth daily.  escitalopram oxalate (LEXAPRO) 10 mg tablet Take 10 mg by mouth daily.  spironolactone (ALDACTONE) 50 mg tablet Take  by mouth daily.  pantoprazole (PROTONIX) 40 mg tablet Take 1 Tab by mouth daily.  rifAXIMin (XIFAXAN) 550 mg tablet Take 1 Tab by mouth two (2) times a day. Indications: impaired brain function due to liver disease    aspirin delayed-release 81 mg tablet Take 81 mg by mouth daily.  cyanocobalamin (VITAMIN B12) 500 mcg tablet Take 500 mcg by mouth daily.  atorvastatin (LIPITOR) 10 mg tablet Take 1 Tab by mouth daily.  lactulose (CHRONULAC) 10 gram/15 mL solution Take 20 g by mouth three (3) times daily. Indications: Hepatic Encephalopathy    acetaminophen (TYLENOL) 325 mg tablet Take 325 mg by mouth every six (6) hours as needed for Fever or Pain.  rifAXIMin (XIFAXAN) 550 mg tablet Take 1 Tab by mouth two (2) times a day. No current facility-administered medications for this visit. SYSTEM REVIEW NOT RELATED TO LIVER DISEASE OR REVIEWED ABOVE:  Constitution systems: Negative for fever, chills, weight loss. Eyes: Negative for visual changes. ENT: Negative for sore throat, painful swallowing. Respiratory: Negative for cough, hemoptysis, SOB. Cardiology: Negative for chest pain, palpitations. GI:  Negative for constipation or diarrhea. : Negative for urinary frequency, dysuria, hematuria, nocturia. Skin: Negative for rash. Hematology: Negative for easy bruising, blood clots. Musculo-skelatal: Negative for back pain, muscle pain, weakness. Neurologic: Negative for headaches, dizziness, vertigo, memory problems not related to HE. Psychology: Negative for anxiety, depression.      FAMILY/SOCIAL HISTORY:  The patient is . There are 3 children. The patient smokes tobacco. The patient has been abstinent from alcohol since 2007. The patient is currently on disability. PHYSICAL EXAMINATION:    Visit Vitals  /67 (BP 1 Location: Right arm, BP Patient Position: Sitting)   Pulse 73   Temp 98.9 °F (37.2 °C) (Tympanic)   Resp 16   Ht 5' 11\" (1.803 m)   Wt 218 lb (98.9 kg)   SpO2 98%   BMI 30.40 kg/m²       General: No acute distress. Eyes: Sclera anicteric. ENT: No oral lesions, thyroid normal.  Nodes: No adenopathy. Skin: Spider angiomata on chest, palmar erythema. Respiratory: Lungs clear to auscultation. Cardiovascular: Regular heart rate, no murmurs, no JVD. Abdomen: Soft non-tender, liver enlarged. Spleen not palpable. No obvious ascites. Umbilical hernia present. Extremities: Trace bilateral pedal edema. No muscle wasting. No gross arthritic changes. Neurologic: Alert and oriented, cranial nerves grossly intact, no asterixsis.     LABORATORY STUDIES:  [address] Units 2/7/2019   WBC 4.6 - 13.2 K/uL 5.4   ANC 1.8 - 8.0 K/UL 3.9   HGB 13.0 - 16.0 g/dL 12.4 (L)    - 420 K/uL 59 (L)   INR 0.8 - 1.2   1.4 (H)   AST 15 - 37 U/L 48 (H)   ALT 16 - 61 U/L 31   Alk Phos 45 - 117 U/L 74   Bili, Total 0.2 - 1.0 MG/DL 3.4 (H)   Bili, Direct 0.0 - 0.2 MG/DL 1.1 (H)   Albumin 3.4 - 5.0 g/dL 3.1 (L)   BUN 7.0 - 18 MG/DL 11   Creat 0.6 - 1.3 MG/DL 0.90   Na 136 - 145 mmol/L 136   K 3.5 - 5.5 mmol/L 3.9   Cl 100 - 108 mmol/L 103   CO2 21 - 32 mmol/L 27   Glucose 74 - 99 mg/dL 99   Ammonia 11 - 32 UMOL/L 44 (H)     Liver [facility] Latest Ref Rng & Units 1/30/2019 10/29/2018   WBC 4.6 - 13.2 K/uL 5.9 4.0 (L)   ANC 1.8 - 8.0 K/UL 5.1 3.0   HGB 13.0 - 16.0 g/dL 13.9 13.3    - 420 K/uL 57 (L) 49 (L)   INR 0.8 - 1.2   1.5 (H) 1.5 (H)   AST 15 - 37 U/L 48 (H) 58 (H)   ALT 16 - 61 U/L 33 37   Alk Phos 45 - 117 U/L 81 87   Bili, Total 0.2 - 1.0 MG/DL 3.0 (H) 2.0 (H)   Bili, Direct 0.0 - 0.2 MG/DL  0.8 (H)   Albumin 3.4 - 5.0 g/dL 3.2 (L) 3.0 (L)   BUN 7.0 - 18 MG/DL 10 10   Creat 0.6 - 1.3 MG/DL 0.87 0.85   Na 136 - 145 mmol/L 136 140   K 3.5 - 5.5 mmol/L 3.8 3.6   Cl 100 - 108 mmol/L 102 102   CO2 21 - 32 mmol/L 26 26   Glucose 74 - 99 mg/dL 113 (H) 87   Ammonia 11 - 32 UMOL/L 48 (H) 34 (H)     Cancer Screening Latest Ref Rng & Units 2/7/2019   AFP, Serum 0.0 - 8.0 ng/mL 9.8 (H)   AFP-L3% 0.0 - 9.9 % 8.2     Cancer Screening Latest Ref Rng & Units 1/30/2019 10/29/2018 9/5/2018   AFP, Serum 0.0 - 8.0 ng/mL 10.8 (H) 9.0 (H) 9.5 (H)   AFP-L3% 0.0 - 9.9 % 7.6 7.0 6.1     SEROLOGIES:  12/10:  HAV total positive, HB surface antigen negative, anti-HB surface negative, HCV genotype 1, HCV RNA log 5.5,    7/2011: Ferritin 73, iron saturation 57%. LIVER HISTOLOGY:  07/2018. TRANSIENT HEPATIC ELASTOGRAPHY:   E Range: 9.44-22.22 kPa  E Mean: 14.32 kPa  E Median: 14.68 kPa  E Std: 3.91 kPa    ENDOSCOPIC PROCEDURES:  9/2010:  Colonoscopy while hospitalized in [facility] - reported to demonstrate colonic polyps. 9/2010:  EGD while hospitalized in [facility] - reported to demonstrate severe gastritis from NSAIDs with small esophageal varices. 2/2011: EGD by [facility]. Esophageal varicies. Banding performed. 11/2011:  EGD by [facility]. Severe portal gastropathy. No varices. 4/2012:  EGD by [facility]:  Grade 2 esophageal varices. Banding performed. Severe hypertensive portal gastropathy. 8/2012:  EGD per [facility]. Small esophageal varices. Severe portal hypertensive gastropathy, gastritis of the antrum, no gastric varices. Repeat in 6 months. 10/2013. EGD by Dr Kim Hoffman. Small varices with stigmata of bleeding. Portal gastropathy. 07/2014:  EGD per [facility]. 2 medium esophageal varices. Banding performed. Moderate portal hypertensive gastropathy. Repeat in one year. 9/1/2015: EGD per [facility]. Duodenal ulcer present. H. Pylori negative. Portal hypertensive gastropathy.   Esophageal varices present. Four bands placed. Repeat in 6 months. 11/2015:  EGD per Dr. Peace Schmid. Moderate portal gastropathy. Grade 1 esophageal varices. No gastric varices noted. 11/2015:  Colonoscopy per Dr. Peace Schmid. Mild diverticulosis. 11/2016:  EGD per Dr. Brayan Rojas. Small esophageal varices. Severe hypertensive portal gastropathy. Repeat in 6 months. 01/2018. EGD by Dr. Charline Woods. No report available. 10/2018. EGD by Dr. Adarsh Narayan. Normal esophagus. Pathology negative. Repeat in 2 years. RADIOLOGY:  11/2010: Ultrasound of the liver. Echogenic consistent with cirrhosis. No liver mass lesions. No ascites. 2/2011: Ultrasound of the liver. Changes consistent with cirrhosis. No liver mass lesions. No ascites. No portal vein thrombosis. 11/2011: Ultrasound of the liver. Echogenic consistent with chronic liver disease, cirrhosis. No liver mass lesions. No ascites. 04/12:  CT of abdomen and pelvis - hepatic cirrhosis with portal hypertension with scattered upper abdominal collateralzation and varices. Likely recanalization of the umbilical artery. No mass noted. 1/2015:  Ultrasound of liver. Echogenic consistent with cirrhosis. No liver mass lesions. No dilated bile ducts. No ascites. 8/2015:  CT of the abdomen and pelvis. Coarsened, micronodular surface of the liver with widening of the interlobar fissure. No intrahepatic biliary ductal dilation. Stable enlargement of the portal vein with recanalization of the umbilical vein. Paraesophageal varices. Grossly stable splenomegaly  1/2016:  Ultrasound of liver. Echogenic consistent with cirrhosis. No liver mass lesions. No dilated bile ducts. No ascites. 11/2016:  Ultrasound of the liver. Echogenic consistent with cirrhosis. No liver mass lesions. No dilated bile ducts. No ascites. 05/2018. Abdominal ultrasound. Coarsened hepatic echogenicity consistent with hepatic parenchymal disease.  No focal lesions or biliary dilatation demonstrated. 07/2018. Ultrasound of the liver. Cirrhotic hepatic morphology without focal hepatic lesion. 12/2018. Ultrasound of the liver. Stable right upper abdominal ultrasound, since 6-2018. Findings consistent with cirrhosis/chronic hepatocellular disease. Recanalized paraumbilical left hepatic vein. No perihepatic ascites. but without evidence of discrete focal mass  01/2019. Brain MRI. \"Probable recent acute/subacute infarct involving the anterior aspect of the left temporal stem. No evidence of associated hemorrhage or mass effect. \"  02/2019. Brain MRI. [facility]). Findings consistent with acute infarcts in the thalamus bilaterally as well as the left basal ganglia and the medial aspect of the left temporal lobe. Subacute infarct in the right basal ganglia which does not have restricted diffusion. 03/2019. Abdominal CT w contrast. ([address]). There is hepatic cirrhosis with diffuse nodular hepatic contour. Caudate lobe and left lobe liver are small.  No discrete hepatic mass. 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 The HCV has been treated and cured. The most recent laboratory studies indicate that the AST is elevated, the ALT is normal, alkaline phosphatase is normal, tests of hepatic synthetic and metabolic function are depressed, and the platelet count is depressed. Will perform laboratory testing to monitor liver function and degree of liver injury. This will include hepatic panel, a CBC w/ diff, a BMP, a PT/INR, an ammonia level, and an AFP-L3%. Complications of cirrhosis were discussed in detail. We discussed thrombocytopenia, portal hypertension, varices, GI bleeding, peripheral edema, ascites, hepatic encephalopathy, and hepatocellular carcinoma. We discussed the need for follow ups on a regular basis, at 3 month intervals to monitor for complications. We discussed the need for every 6 month liver imaging studies.      HCV treatment. Mr. Fabiola Huang completed twenty-four weeks of treatment with Sylvie Aguilar with good toleration in 09/2016. He remained HCV RNA undetectable one year post treatment. CVA. Follow with neurology as directed. Upper GI bleed appears to have been due to portal gastropathy. Repeat EGD was performed in 10/2018. Normal esophagus. Some gastritis of the atrium. Pathology is negative. Repeat EGD in 10/2020. Hepatic encephalopathy. I have ordered an ammonia level today. Lower extremity. The patient is currently on 40 mg lasix and 50 mg aldactone daily. Continue this dose. Edema well controlled. BUN/CRT are WNL. I have spoken to him and his daughter about the importance of salt restrictions. The patient was counseled regarding alcohol consumption. Vaccination for viral hepatitis B is recommended since the patient has no serologic evidence of previous exposure or vaccination with immunity. Vaccination for viral hepatitis A is not required. The patient has serologic evidence of prior exposure or vaccination with immunity. [address] screening will be performed. AFP ordered today. Abdominal ultrasound ordered. This will be performed with shear wave elastography. Elastography  can assess liver fibrosis and determine if a patient has advanced fibrosis or cirrhosis without the need for liver biopsy. Thrombocytopenia secondary to cirrhosis from chronic HCV. No treatment necessary. [facility] 87 in 8 weeks. All of the above issues were discussed with the patient. All questions were answered. The patient expressed a clear understanding of the above.     Douglas Pena, FNP-C  [facility] [place] [facility]  [address], [address]   [address], [facility]   [phone]

## 2019-05-01 NOTE — PROGRESS NOTES
Yocasta Andrew is a 59 y.o. male      1. Have you been to the ER, urgent care clinic or hospitalized since your last visit? YES. Patient has been to [facility] since last visit for stroke and was admitted. 2. Have you seen or consulted any other health care providers outside of the [facility] since your last visit (Include any pap smears or colon screening)? YES      Patient has been to rehab through [place] since last visit.        Learning Assessment 1/30/2019   PRIMARY LEARNER Patient   BARRIERS PRIMARY LEARNER NONE   CO-LEARNER CAREGIVER No   PRIMARY LANGUAGE ENGLISH   LEARNER PREFERENCE PRIMARY LISTENING   ANSWERED BY PATIENT   RELATIONSHIP SELF

## 2019-05-02 LAB
AFP L3 MFR SERPL: 7.3 % (ref 0–9.9)
AFP SERPL-MCNC: 7.2 NG/ML (ref 0–8)

## 2019-05-14 LAB
ATRIAL RATE: 88 BPM
CALCULATED P AXIS, ECG09: 45 DEGREES
CALCULATED R AXIS, ECG10: 33 DEGREES
CALCULATED T AXIS, ECG11: 39 DEGREES
DIAGNOSIS, 93000: NORMAL
P-R INTERVAL, ECG05: 164 MS
Q-T INTERVAL, ECG07: 386 MS
QRS DURATION, ECG06: 84 MS
QTC CALCULATION (BEZET), ECG08: 467 MS
VENTRICULAR RATE, ECG03: 88 BPM

## 2019-06-14 ENCOUNTER — HOME HEALTH ADMISSION (OUTPATIENT)
Dept: HOME HEALTH SERVICES | Facility: HOME HEALTH | Age: 64
End: 2019-06-14
Payer: MEDICARE

## 2019-06-16 ENCOUNTER — HOME CARE VISIT (OUTPATIENT)
Dept: SCHEDULING | Facility: HOME HEALTH | Age: 64
End: 2019-06-16
Payer: MEDICARE

## 2019-06-16 VITALS
RESPIRATION RATE: 16 BRPM | TEMPERATURE: 97 F | DIASTOLIC BLOOD PRESSURE: 60 MMHG | BODY MASS INDEX: 28.84 KG/M2 | HEIGHT: 71 IN | HEART RATE: 76 BPM | SYSTOLIC BLOOD PRESSURE: 108 MMHG | OXYGEN SATURATION: 98 % | WEIGHT: 206 LBS

## 2019-06-16 PROCEDURE — 400013 HH SOC

## 2019-06-16 PROCEDURE — 3331090001 HH PPS REVENUE CREDIT

## 2019-06-16 PROCEDURE — 3331090002 HH PPS REVENUE DEBIT

## 2019-06-16 PROCEDURE — G0299 HHS/HOSPICE OF RN EA 15 MIN: HCPCS

## 2019-06-17 ENCOUNTER — HOME CARE VISIT (OUTPATIENT)
Dept: HOME HEALTH SERVICES | Facility: HOME HEALTH | Age: 64
End: 2019-06-17
Payer: MEDICARE

## 2019-06-17 PROCEDURE — 3331090001 HH PPS REVENUE CREDIT

## 2019-06-17 PROCEDURE — 3331090002 HH PPS REVENUE DEBIT

## 2019-06-18 PROCEDURE — 3331090001 HH PPS REVENUE CREDIT

## 2019-06-18 PROCEDURE — 3331090002 HH PPS REVENUE DEBIT

## 2019-06-19 ENCOUNTER — HOME CARE VISIT (OUTPATIENT)
Dept: HOME HEALTH SERVICES | Facility: HOME HEALTH | Age: 64
End: 2019-06-19
Payer: MEDICARE

## 2019-06-19 PROCEDURE — 3331090001 HH PPS REVENUE CREDIT

## 2019-06-19 PROCEDURE — 3331090002 HH PPS REVENUE DEBIT

## 2019-06-20 ENCOUNTER — HOME CARE VISIT (OUTPATIENT)
Dept: SCHEDULING | Facility: HOME HEALTH | Age: 64
End: 2019-06-20
Payer: MEDICARE

## 2019-06-20 ENCOUNTER — HOME CARE VISIT (OUTPATIENT)
Dept: HOME HEALTH SERVICES | Facility: HOME HEALTH | Age: 64
End: 2019-06-20
Payer: MEDICARE

## 2019-06-20 VITALS
SYSTOLIC BLOOD PRESSURE: 111 MMHG | HEART RATE: 79 BPM | DIASTOLIC BLOOD PRESSURE: 73 MMHG | TEMPERATURE: 88.1 F | RESPIRATION RATE: 16 BRPM | OXYGEN SATURATION: 95 %

## 2019-06-20 PROCEDURE — G0152 HHCP-SERV OF OT,EA 15 MIN: HCPCS

## 2019-06-20 PROCEDURE — 3331090001 HH PPS REVENUE CREDIT

## 2019-06-20 PROCEDURE — 3331090002 HH PPS REVENUE DEBIT

## 2019-06-20 PROCEDURE — G0151 HHCP-SERV OF PT,EA 15 MIN: HCPCS

## 2019-06-21 VITALS — SYSTOLIC BLOOD PRESSURE: 108 MMHG | HEART RATE: 83 BPM | DIASTOLIC BLOOD PRESSURE: 70 MMHG

## 2019-06-21 PROCEDURE — 3331090001 HH PPS REVENUE CREDIT

## 2019-06-21 PROCEDURE — 3331090002 HH PPS REVENUE DEBIT

## 2019-06-22 PROCEDURE — 3331090002 HH PPS REVENUE DEBIT

## 2019-06-22 PROCEDURE — 3331090001 HH PPS REVENUE CREDIT

## 2019-06-23 PROCEDURE — 3331090001 HH PPS REVENUE CREDIT

## 2019-06-23 PROCEDURE — 3331090002 HH PPS REVENUE DEBIT

## 2019-06-24 ENCOUNTER — HOME CARE VISIT (OUTPATIENT)
Dept: SCHEDULING | Facility: HOME HEALTH | Age: 64
End: 2019-06-24
Payer: MEDICARE

## 2019-06-24 VITALS
SYSTOLIC BLOOD PRESSURE: 114 MMHG | TEMPERATURE: 97.6 F | HEART RATE: 76 BPM | DIASTOLIC BLOOD PRESSURE: 69 MMHG | RESPIRATION RATE: 14 BRPM | OXYGEN SATURATION: 95 %

## 2019-06-24 PROCEDURE — 3331090001 HH PPS REVENUE CREDIT

## 2019-06-24 PROCEDURE — G0299 HHS/HOSPICE OF RN EA 15 MIN: HCPCS

## 2019-06-24 PROCEDURE — G0157 HHC PT ASSISTANT EA 15: HCPCS

## 2019-06-24 PROCEDURE — 3331090002 HH PPS REVENUE DEBIT

## 2019-06-25 ENCOUNTER — HOME CARE VISIT (OUTPATIENT)
Dept: SCHEDULING | Facility: HOME HEALTH | Age: 64
End: 2019-06-25
Payer: MEDICARE

## 2019-06-25 VITALS
DIASTOLIC BLOOD PRESSURE: 60 MMHG | SYSTOLIC BLOOD PRESSURE: 114 MMHG | OXYGEN SATURATION: 92 % | TEMPERATURE: 98.1 F | HEART RATE: 70 BPM

## 2019-06-25 VITALS — SYSTOLIC BLOOD PRESSURE: 100 MMHG | OXYGEN SATURATION: 97 % | DIASTOLIC BLOOD PRESSURE: 68 MMHG | HEART RATE: 64 BPM

## 2019-06-25 PROCEDURE — G0152 HHCP-SERV OF OT,EA 15 MIN: HCPCS

## 2019-06-25 PROCEDURE — 3331090002 HH PPS REVENUE DEBIT

## 2019-06-25 PROCEDURE — 3331090001 HH PPS REVENUE CREDIT

## 2019-06-26 ENCOUNTER — HOME CARE VISIT (OUTPATIENT)
Dept: SCHEDULING | Facility: HOME HEALTH | Age: 64
End: 2019-06-26
Payer: MEDICARE

## 2019-06-26 PROCEDURE — 3331090002 HH PPS REVENUE DEBIT

## 2019-06-26 PROCEDURE — 3331090001 HH PPS REVENUE CREDIT

## 2019-06-26 PROCEDURE — G0157 HHC PT ASSISTANT EA 15: HCPCS

## 2019-06-27 ENCOUNTER — HOME CARE VISIT (OUTPATIENT)
Dept: HOME HEALTH SERVICES | Facility: HOME HEALTH | Age: 64
End: 2019-06-27
Payer: MEDICARE

## 2019-06-27 ENCOUNTER — HOME CARE VISIT (OUTPATIENT)
Dept: SCHEDULING | Facility: HOME HEALTH | Age: 64
End: 2019-06-27
Payer: MEDICARE

## 2019-06-27 VITALS
HEART RATE: 70 BPM | TEMPERATURE: 98.3 F | SYSTOLIC BLOOD PRESSURE: 98 MMHG | OXYGEN SATURATION: 97 % | DIASTOLIC BLOOD PRESSURE: 58 MMHG

## 2019-06-27 PROCEDURE — 3331090001 HH PPS REVENUE CREDIT

## 2019-06-27 PROCEDURE — 3331090002 HH PPS REVENUE DEBIT

## 2019-06-27 PROCEDURE — G0152 HHCP-SERV OF OT,EA 15 MIN: HCPCS

## 2019-06-28 ENCOUNTER — HOME CARE VISIT (OUTPATIENT)
Dept: SCHEDULING | Facility: HOME HEALTH | Age: 64
End: 2019-06-28
Payer: MEDICARE

## 2019-06-28 VITALS — HEART RATE: 78 BPM | OXYGEN SATURATION: 98 % | SYSTOLIC BLOOD PRESSURE: 94 MMHG | DIASTOLIC BLOOD PRESSURE: 62 MMHG

## 2019-06-28 PROCEDURE — 3331090002 HH PPS REVENUE DEBIT

## 2019-06-28 PROCEDURE — 3331090001 HH PPS REVENUE CREDIT

## 2019-06-28 PROCEDURE — G0299 HHS/HOSPICE OF RN EA 15 MIN: HCPCS

## 2019-06-29 PROCEDURE — 3331090002 HH PPS REVENUE DEBIT

## 2019-06-29 PROCEDURE — 3331090001 HH PPS REVENUE CREDIT

## 2019-06-30 VITALS
TEMPERATURE: 97.4 F | DIASTOLIC BLOOD PRESSURE: 64 MMHG | SYSTOLIC BLOOD PRESSURE: 102 MMHG | RESPIRATION RATE: 14 BRPM | OXYGEN SATURATION: 96 % | HEART RATE: 76 BPM

## 2019-06-30 PROCEDURE — 3331090002 HH PPS REVENUE DEBIT

## 2019-06-30 PROCEDURE — 3331090001 HH PPS REVENUE CREDIT

## 2019-07-01 ENCOUNTER — HOME CARE VISIT (OUTPATIENT)
Dept: SCHEDULING | Facility: HOME HEALTH | Age: 64
End: 2019-07-01
Payer: MEDICARE

## 2019-07-01 PROCEDURE — 3331090002 HH PPS REVENUE DEBIT

## 2019-07-01 PROCEDURE — G0299 HHS/HOSPICE OF RN EA 15 MIN: HCPCS

## 2019-07-01 PROCEDURE — 3331090001 HH PPS REVENUE CREDIT

## 2019-07-02 ENCOUNTER — HOME CARE VISIT (OUTPATIENT)
Dept: HOME HEALTH SERVICES | Facility: HOME HEALTH | Age: 64
End: 2019-07-02
Payer: MEDICARE

## 2019-07-02 ENCOUNTER — HOME CARE VISIT (OUTPATIENT)
Dept: SCHEDULING | Facility: HOME HEALTH | Age: 64
End: 2019-07-02
Payer: MEDICARE

## 2019-07-02 VITALS
RESPIRATION RATE: 16 BRPM | OXYGEN SATURATION: 96 % | TEMPERATURE: 98.4 F | DIASTOLIC BLOOD PRESSURE: 76 MMHG | SYSTOLIC BLOOD PRESSURE: 118 MMHG | HEART RATE: 94 BPM

## 2019-07-02 VITALS
DIASTOLIC BLOOD PRESSURE: 64 MMHG | SYSTOLIC BLOOD PRESSURE: 107 MMHG | TEMPERATURE: 98.2 F | HEART RATE: 79 BPM | OXYGEN SATURATION: 96 %

## 2019-07-02 PROCEDURE — G0152 HHCP-SERV OF OT,EA 15 MIN: HCPCS

## 2019-07-02 PROCEDURE — 3331090002 HH PPS REVENUE DEBIT

## 2019-07-02 PROCEDURE — G0157 HHC PT ASSISTANT EA 15: HCPCS

## 2019-07-02 PROCEDURE — 3331090001 HH PPS REVENUE CREDIT

## 2019-07-02 PROCEDURE — G0153 HHCP-SVS OF S/L PATH,EA 15MN: HCPCS

## 2019-07-03 VITALS — DIASTOLIC BLOOD PRESSURE: 18 MMHG | OXYGEN SATURATION: 95 % | SYSTOLIC BLOOD PRESSURE: 118 MMHG | HEART RATE: 79 BPM

## 2019-07-03 PROCEDURE — 3331090001 HH PPS REVENUE CREDIT

## 2019-07-03 PROCEDURE — 3331090002 HH PPS REVENUE DEBIT

## 2019-07-04 PROCEDURE — 3331090001 HH PPS REVENUE CREDIT

## 2019-07-04 PROCEDURE — 3331090002 HH PPS REVENUE DEBIT

## 2019-07-05 ENCOUNTER — HOME CARE VISIT (OUTPATIENT)
Dept: SCHEDULING | Facility: HOME HEALTH | Age: 64
End: 2019-07-05
Payer: MEDICARE

## 2019-07-05 PROCEDURE — G0157 HHC PT ASSISTANT EA 15: HCPCS

## 2019-07-05 PROCEDURE — 3331090001 HH PPS REVENUE CREDIT

## 2019-07-05 PROCEDURE — 3331090002 HH PPS REVENUE DEBIT

## 2019-07-06 PROCEDURE — 3331090001 HH PPS REVENUE CREDIT

## 2019-07-06 PROCEDURE — 3331090002 HH PPS REVENUE DEBIT

## 2019-07-07 PROCEDURE — 3331090002 HH PPS REVENUE DEBIT

## 2019-07-07 PROCEDURE — 3331090001 HH PPS REVENUE CREDIT

## 2019-07-08 ENCOUNTER — HOME CARE VISIT (OUTPATIENT)
Dept: SCHEDULING | Facility: HOME HEALTH | Age: 64
End: 2019-07-08
Payer: MEDICARE

## 2019-07-08 VITALS
DIASTOLIC BLOOD PRESSURE: 56 MMHG | OXYGEN SATURATION: 96 % | HEART RATE: 83 BPM | SYSTOLIC BLOOD PRESSURE: 101 MMHG | TEMPERATURE: 98.4 F

## 2019-07-08 VITALS
DIASTOLIC BLOOD PRESSURE: 64 MMHG | HEART RATE: 80 BPM | SYSTOLIC BLOOD PRESSURE: 101 MMHG | TEMPERATURE: 97.7 F | OXYGEN SATURATION: 93 %

## 2019-07-08 PROCEDURE — G0153 HHCP-SVS OF S/L PATH,EA 15MN: HCPCS

## 2019-07-08 PROCEDURE — 3331090002 HH PPS REVENUE DEBIT

## 2019-07-08 PROCEDURE — 3331090001 HH PPS REVENUE CREDIT

## 2019-07-09 ENCOUNTER — HOME CARE VISIT (OUTPATIENT)
Dept: SCHEDULING | Facility: HOME HEALTH | Age: 64
End: 2019-07-09
Payer: MEDICARE

## 2019-07-09 PROCEDURE — 3331090001 HH PPS REVENUE CREDIT

## 2019-07-09 PROCEDURE — G0157 HHC PT ASSISTANT EA 15: HCPCS

## 2019-07-09 PROCEDURE — 3331090002 HH PPS REVENUE DEBIT

## 2019-07-10 PROCEDURE — 3331090002 HH PPS REVENUE DEBIT

## 2019-07-10 PROCEDURE — 3331090001 HH PPS REVENUE CREDIT

## 2019-07-11 ENCOUNTER — HOME CARE VISIT (OUTPATIENT)
Dept: SCHEDULING | Facility: HOME HEALTH | Age: 64
End: 2019-07-11
Payer: MEDICARE

## 2019-07-11 VITALS
SYSTOLIC BLOOD PRESSURE: 98 MMHG | DIASTOLIC BLOOD PRESSURE: 58 MMHG | OXYGEN SATURATION: 96 % | HEART RATE: 78 BPM | TEMPERATURE: 97.3 F

## 2019-07-11 VITALS
SYSTOLIC BLOOD PRESSURE: 103 MMHG | TEMPERATURE: 97.3 F | HEART RATE: 74 BPM | OXYGEN SATURATION: 96 % | DIASTOLIC BLOOD PRESSURE: 62 MMHG

## 2019-07-11 PROCEDURE — G0153 HHCP-SVS OF S/L PATH,EA 15MN: HCPCS

## 2019-07-11 PROCEDURE — 3331090002 HH PPS REVENUE DEBIT

## 2019-07-11 PROCEDURE — G0157 HHC PT ASSISTANT EA 15: HCPCS

## 2019-07-11 PROCEDURE — 3331090001 HH PPS REVENUE CREDIT

## 2019-07-12 VITALS
SYSTOLIC BLOOD PRESSURE: 92 MMHG | HEART RATE: 74 BPM | TEMPERATURE: 97.3 F | DIASTOLIC BLOOD PRESSURE: 60 MMHG | OXYGEN SATURATION: 95 %

## 2019-07-12 PROCEDURE — 3331090002 HH PPS REVENUE DEBIT

## 2019-07-12 PROCEDURE — 3331090001 HH PPS REVENUE CREDIT

## 2019-07-13 PROCEDURE — 3331090001 HH PPS REVENUE CREDIT

## 2019-07-13 PROCEDURE — 3331090002 HH PPS REVENUE DEBIT

## 2019-07-14 PROCEDURE — 3331090001 HH PPS REVENUE CREDIT

## 2019-07-14 PROCEDURE — 3331090002 HH PPS REVENUE DEBIT

## 2019-07-15 PROCEDURE — 3331090001 HH PPS REVENUE CREDIT

## 2019-07-15 PROCEDURE — 3331090002 HH PPS REVENUE DEBIT

## 2019-07-16 ENCOUNTER — HOME CARE VISIT (OUTPATIENT)
Dept: SCHEDULING | Facility: HOME HEALTH | Age: 64
End: 2019-07-16
Payer: MEDICARE

## 2019-07-16 VITALS
DIASTOLIC BLOOD PRESSURE: 70 MMHG | HEART RATE: 84 BPM | OXYGEN SATURATION: 97 % | SYSTOLIC BLOOD PRESSURE: 108 MMHG | TEMPERATURE: 99.3 F

## 2019-07-16 VITALS
DIASTOLIC BLOOD PRESSURE: 62 MMHG | SYSTOLIC BLOOD PRESSURE: 104 MMHG | OXYGEN SATURATION: 95 % | HEART RATE: 82 BPM | TEMPERATURE: 98.2 F

## 2019-07-16 PROCEDURE — 3331090001 HH PPS REVENUE CREDIT

## 2019-07-16 PROCEDURE — 3331090002 HH PPS REVENUE DEBIT

## 2019-07-16 PROCEDURE — G0153 HHCP-SVS OF S/L PATH,EA 15MN: HCPCS

## 2019-07-16 PROCEDURE — G0157 HHC PT ASSISTANT EA 15: HCPCS

## 2019-07-17 PROCEDURE — 3331090001 HH PPS REVENUE CREDIT

## 2019-07-17 PROCEDURE — 3331090002 HH PPS REVENUE DEBIT

## 2019-07-18 ENCOUNTER — HOME CARE VISIT (OUTPATIENT)
Dept: SCHEDULING | Facility: HOME HEALTH | Age: 64
End: 2019-07-18
Payer: MEDICARE

## 2019-07-18 PROCEDURE — G0153 HHCP-SVS OF S/L PATH,EA 15MN: HCPCS

## 2019-07-18 PROCEDURE — 3331090001 HH PPS REVENUE CREDIT

## 2019-07-18 PROCEDURE — 3331090002 HH PPS REVENUE DEBIT

## 2019-07-19 ENCOUNTER — HOME CARE VISIT (OUTPATIENT)
Dept: SCHEDULING | Facility: HOME HEALTH | Age: 64
End: 2019-07-19
Payer: MEDICARE

## 2019-07-19 VITALS
TEMPERATURE: 98.7 F | SYSTOLIC BLOOD PRESSURE: 101 MMHG | OXYGEN SATURATION: 94 % | DIASTOLIC BLOOD PRESSURE: 68 MMHG | HEART RATE: 73 BPM

## 2019-07-19 PROCEDURE — 3331090001 HH PPS REVENUE CREDIT

## 2019-07-19 PROCEDURE — G0151 HHCP-SERV OF PT,EA 15 MIN: HCPCS

## 2019-07-19 PROCEDURE — 3331090002 HH PPS REVENUE DEBIT

## 2019-07-20 VITALS
TEMPERATURE: 98.4 F | OXYGEN SATURATION: 97 % | RESPIRATION RATE: 16 BRPM | DIASTOLIC BLOOD PRESSURE: 67 MMHG | SYSTOLIC BLOOD PRESSURE: 99 MMHG

## 2019-07-20 PROCEDURE — 3331090001 HH PPS REVENUE CREDIT

## 2019-07-20 PROCEDURE — 3331090002 HH PPS REVENUE DEBIT

## 2019-07-21 PROCEDURE — 3331090002 HH PPS REVENUE DEBIT

## 2019-07-21 PROCEDURE — 3331090001 HH PPS REVENUE CREDIT

## 2019-07-22 ENCOUNTER — OFFICE VISIT (OUTPATIENT)
Dept: HEMATOLOGY | Age: 64
End: 2019-07-22

## 2019-07-22 ENCOUNTER — HOSPITAL ENCOUNTER (OUTPATIENT)
Dept: LAB | Age: 64
Discharge: HOME | End: 2019-07-22
Payer: MEDICARE

## 2019-07-22 VITALS
OXYGEN SATURATION: 98 % | RESPIRATION RATE: 16 BRPM | BODY MASS INDEX: 29.12 KG/M2 | WEIGHT: 208 LBS | HEART RATE: 97 BPM | HEIGHT: 71 IN | DIASTOLIC BLOOD PRESSURE: 67 MMHG | TEMPERATURE: 98.8 F | SYSTOLIC BLOOD PRESSURE: 112 MMHG

## 2019-07-22 DIAGNOSIS — K74.60 CIRRHOSIS OF LIVER WITHOUT ASCITES, UNSPECIFIED HEPATIC CIRRHOSIS TYPE (HCC): Primary | ICD-10-CM

## 2019-07-22 DIAGNOSIS — K74.60 CIRRHOSIS OF LIVER WITHOUT ASCITES, UNSPECIFIED HEPATIC CIRRHOSIS TYPE (HCC): ICD-10-CM

## 2019-07-22 LAB
ALBUMIN SERPL-MCNC: 2.9 G/DL (ref 3.4–5)
ALBUMIN/GLOB SERPL: 0.8 {RATIO} (ref 0.8–1.7)
ALP SERPL-CCNC: 151 U/L (ref 45–117)
ALT SERPL-CCNC: 55 U/L (ref 16–61)
AMMONIA PLAS-SCNC: 33 UMOL/L (ref 11–32)
ANION GAP SERPL CALC-SCNC: 6 MMOL/L (ref 3–18)
AST SERPL-CCNC: 75 U/L (ref 10–38)
BASOPHILS # BLD: 0.1 K/UL (ref 0–0.1)
BASOPHILS NFR BLD: 1 % (ref 0–2)
BILIRUB DIRECT SERPL-MCNC: 1 MG/DL (ref 0–0.2)
BILIRUB SERPL-MCNC: 2.8 MG/DL (ref 0.2–1)
BUN SERPL-MCNC: 9 MG/DL (ref 7–18)
BUN/CREAT SERPL: 10 (ref 12–20)
CALCIUM SERPL-MCNC: 8.1 MG/DL (ref 8.5–10.1)
CHLORIDE SERPL-SCNC: 105 MMOL/L (ref 100–111)
CO2 SERPL-SCNC: 27 MMOL/L (ref 21–32)
CREAT SERPL-MCNC: 0.87 MG/DL (ref 0.6–1.3)
DIFFERENTIAL METHOD BLD: ABNORMAL
EOSINOPHIL # BLD: 0.3 K/UL (ref 0–0.4)
EOSINOPHIL NFR BLD: 5 % (ref 0–5)
ERYTHROCYTE [DISTWIDTH] IN BLOOD BY AUTOMATED COUNT: 15.1 % (ref 11.6–14.5)
GLOBULIN SER CALC-MCNC: 3.8 G/DL (ref 2–4)
GLUCOSE SERPL-MCNC: 80 MG/DL (ref 74–99)
HCT VFR BLD AUTO: 36.9 % (ref 36–48)
HGB BLD-MCNC: 13.1 G/DL (ref 13–16)
INR PPP: 1.4 (ref 0.8–1.2)
LYMPHOCYTES # BLD: 0.4 K/UL (ref 0.9–3.6)
LYMPHOCYTES NFR BLD: 6 % (ref 21–52)
MCH RBC QN AUTO: 34 PG (ref 24–34)
MCHC RBC AUTO-ENTMCNC: 35.5 G/DL (ref 31–37)
MCV RBC AUTO: 95.8 FL (ref 74–97)
MONOCYTES # BLD: 0.9 K/UL (ref 0.05–1.2)
MONOCYTES NFR BLD: 14 % (ref 3–10)
NEUTS SEG # BLD: 4.8 K/UL (ref 1.8–8)
NEUTS SEG NFR BLD: 74 % (ref 40–73)
PLATELET # BLD AUTO: 60 K/UL (ref 135–420)
PMV BLD AUTO: 10 FL (ref 9.2–11.8)
POTASSIUM SERPL-SCNC: 3.6 MMOL/L (ref 3.5–5.5)
PROT SERPL-MCNC: 6.7 G/DL (ref 6.4–8.2)
PROTHROMBIN TIME: 17 SEC (ref 11.5–15.2)
RBC # BLD AUTO: 3.85 M/UL (ref 4.7–5.5)
SODIUM SERPL-SCNC: 138 MMOL/L (ref 136–145)
WBC # BLD AUTO: 6.4 K/UL (ref 4.6–13.2)

## 2019-07-22 PROCEDURE — 80048 BASIC METABOLIC PNL TOTAL CA: CPT

## 2019-07-22 PROCEDURE — 80076 HEPATIC FUNCTION PANEL: CPT

## 2019-07-22 PROCEDURE — 3331090001 HH PPS REVENUE CREDIT

## 2019-07-22 PROCEDURE — 3331090002 HH PPS REVENUE DEBIT

## 2019-07-22 PROCEDURE — 36415 COLL VENOUS BLD VENIPUNCTURE: CPT

## 2019-07-22 PROCEDURE — 82140 ASSAY OF AMMONIA: CPT

## 2019-07-22 PROCEDURE — 82107 ALPHA-FETOPROTEIN L3: CPT

## 2019-07-22 PROCEDURE — 85610 PROTHROMBIN TIME: CPT

## 2019-07-22 PROCEDURE — 85025 COMPLETE CBC W/AUTO DIFF WBC: CPT

## 2019-07-22 NOTE — PROGRESS NOTES
Iglesia Vargas is a 59 y.o. male      1. Have you been to the ER, urgent care clinic or hospitalized since your last visit? YES. Patient has been to [place] ED since last visit for stroke and was admitted. After admitted he was sent to inpatient rehabilitation. 2. Have you seen or consulted any other health care providers outside of the [address] [place] [address] since your last visit (Include any pap smears or colon screening)? YES    Patient has been to in home stroke rehabilitation and has been to pcp.         Learning Assessment 1/30/2019   PRIMARY LEARNER Patient   BARRIERS PRIMARY LEARNER NONE   CO-LEARNER CAREGIVER No   PRIMARY LANGUAGE ENGLISH   LEARNER PREFERENCE PRIMARY LISTENING   ANSWERED BY PATIENT   RELATIONSHIP SELF

## 2019-07-22 NOTE — PROGRESS NOTES
[address] [place] [address], MD, MD Alycia Mclaughlin Junior, PA-C Teresita Oka, ACN-BC     Jaimie Khalilworth, AGPCNP-BC   Gary Schuyler, ELISABETP-MARIA TERESA Smith, [facility]       Holland Salazar [facility] 136    at [address], [address] [place] [address], [facility] 22.    [phone]    FAX: [address] [place] [address], [address], [address] [place] [address],[address]    [address] [place] [address] Street: [phone]           Patient Care Team:  Violette Roberts MD as PCP - General (Internal Medicine)  Rossi Romano MD (Neurology)      Problem List  Date Reviewed: 7/22/2019          Codes Class Noted    Hepatic encephalopathy [facility]) ICD-10-CM: K72.90  ICD-9-CM: 572.2  2/1/2019        CVA (cerebral vascular accident) [facility]) ICD-10-CM: I63.9  ICD-9-CM: 434.91  1/30/2019        Elevated INR ICD-10-CM: R79.1  ICD-9-CM: 790.92  1/30/2019        Back pain ICD-10-CM: M54.9  ICD-9-CM: 724.5  1/30/2019        Chest pressure ICD-10-CM: R07.89  ICD-9-CM: 786.59  1/30/2019        Cellulitis ICD-10-CM: L03.90  ICD-9-CM: 682.9  7/24/2018        Portal hypertensive gastropathy ([facility] ICD-10-CM: K76.6, K31.89  ICD-9-CM: 572.3, 537.89  12/22/2013        Cirrhosis ([facility] ICD-10-CM: K74.60  ICD-9-CM: 571.5  9/5/2011        Upper GI bleed secondary to NSAID induced gastritis - 9/2010 ICD-10-CM: K92.2  ICD-9-CM: 578.9  9/5/2011        S/P lumbar laminectomy with spinal fusion - 1992 ICD-10-CM: Z98.1  ICD-9-CM: V45.4  9/5/2011        Colon polyps ICD-10-CM: K63.5  ICD-9-CM: 211.3  9/5/2011    Overview Signed 9/5/2011 10:38 AM by Sean Borges MD     Last colonoscopy 9/2010             Esophageal varices ([facility] ICD-10-CM: I85.00  ICD-9-CM: 456.1  9/5/2011        Thrombocytopenia secondary to cirrhosis ICD-10-CM: D69.6  ICD-9-CM: 287.5  9/5/2011        Chronic hepatitis C genotype 1 ICD-10-CM: B18.2  ICD-9-CM: 070.54  Unknown              Linus Earing returns to the [facility] for 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 The HCV has been treated and cured. The active problem list, all pertinent past medical history, medications, liver histology, endoscopic studies, radiologic findings and laboratory findings related to the liver disorder were reviewed with the patient. The patient was seen here in 01/30/2019. He went to the ER after that appointment because he was complaining of blurred vision, lightheadedness, and headache. He was diagnosed with CVA. Mr. Jeri Chatman suffered a severe ischemic CVA on 02/15/2019 and was at [facility] for 5 days. He then was discharged to [facility] where he underwent rehab for 2 weeks. After that, he spent another 2 weeks at [facility]. The patient suffered yet another CVA on 05/24/2019. Hospitalized for one week then to rehab. He has been home about four weeks now and is receiving home health care, speech therapy, and physical therapy at home. Ambulating with a walker today. The patient has esophageal varices without bleeding. His previous UGI bleed was from portal gastropathy. Last EGD was performed in 10/2018. Mr. Jeri Chatman peripheral edema is well controlled with 40 mg lasix and 50 mg aldactone daily. He is currently taking lactulose and tolerating this without complaint. Mr. Jeri Chatman participated in the Astral B clinical trial.  Unfortunately he relapsed 12 weeks post treatment. Mr. Jeri Chatman completed twenty-four weeks of treatment with Arl[facility] Risk in 2015. The HCV has been treated and cured. The patient has no complaints which can be attributed to liver disease.     The patient has not experienced fatigue, fevers, chills, shortness of breath, chest pain, pain in the right side over the liver, diffuse abdominal pain, nausea, vomiting, constipation, diarrhrea, dry eyes, dry mouth, arthralgias, myalgias, yellowing of the eyes or skin, itching, dark urine, problems concentrating, swelling of the abdomen, swelling of the lower extremities, hematemesis, or hematochezia. ALLERGIES:  Allergies   Allergen Reactions    Aspirin Other (comments)     Bleeding      Valium [Diazepam] Swelling     Current Outpatient Medications   Medication Sig    furosemide (LASIX) 40 mg tablet Take 40 mg by mouth daily.  escitalopram oxalate (LEXAPRO) 10 mg tablet Take 10 mg by mouth daily.  spironolactone (ALDACTONE) 50 mg tablet Take  by mouth daily.  pantoprazole (PROTONIX) 40 mg tablet Take 1 Tab by mouth daily.  aspirin delayed-release 81 mg tablet Take 81 mg by mouth daily.  cyanocobalamin (VITAMIN B12) 500 mcg tablet Take 500 mcg by mouth daily.  atorvastatin (LIPITOR) 10 mg tablet Take 1 Tab by mouth daily.  lactulose (CHRONULAC) 10 gram/15 mL solution Take 20 g by mouth three (3) times daily. Indications: Hepatic Encephalopathy    acetaminophen (TYLENOL) 325 mg tablet Take 325 mg by mouth every six (6) hours as needed for Fever or Pain.  rifAXIMin (XIFAXAN) 550 mg tablet Take 1 Tab by mouth two (2) times a day. (Patient not taking: Reported on 6/28/2019)     No current facility-administered medications for this visit. SYSTEM REVIEW NOT RELATED TO LIVER DISEASE OR REVIEWED ABOVE:  Constitution systems: Negative for fever, chills, weight loss. Eyes: Negative for visual changes. ENT: Negative for sore throat, painful swallowing. Respiratory: Negative for cough, hemoptysis, SOB. Cardiology: Negative for chest pain, palpitations. GI:  Negative for constipation or diarrhea. : Negative for urinary frequency, dysuria, hematuria, nocturia. Skin: Negative for rash. Hematology: Negative for easy bruising, blood clots.     Musculo-skelatal: Negative for back pain, muscle pain, weakness. Neurologic: Negative for headaches, dizziness, vertigo, memory problems not related to HE. Psychology: Negative for anxiety, depression. FAMILY/SOCIAL HISTORY:  The patient is . There are 3 children. The patient smokes tobacco. The patient has been abstinent from alcohol since 2007. The patient is currently on disability. PHYSICAL EXAMINATION:    Visit Vitals  /67 (BP 1 Location: Right arm, BP Patient Position: Sitting)   Pulse 97   Temp 98.8 °F (37.1 °C) (Tympanic)   Resp 16   Ht 5' 11\" (1.803 m)   Wt 208 lb (94.3 kg) Comment: per patient   SpO2 98%   BMI 29.01 kg/m²       General: No acute distress. Eyes: Sclera anicteric. ENT: No oral lesions, thyroid normal.  Nodes: No adenopathy. Skin: Spider angiomata on chest, palmar erythema. Respiratory: Lungs clear to auscultation. Cardiovascular: Regular heart rate, no murmurs, no JVD. Abdomen: Soft non-tender, liver enlarged. Spleen not palpable. No obvious ascites. Umbilical hernia present. Extremities: Trace bilateral pedal edema. No muscle wasting. No gross arthritic changes. Neurologic: Alert and oriented, cranial nerves grossly intact, no asterixsis.     LABORATORY STUDIES:  Liver [place] of [id number] Units 7/22/2019 5/1/2019 2/7/2019   WBC 4.6 - 13.2 K/uL 6.4 3.3 (L) 5.4   ANC 1.8 - 8.0 K/UL 4.8 2.6 3.9   HGB 13.0 - 16.0 g/dL 13.1 12.7 (L) 12.4 (L)    - 420 K/uL 60 (L) 34 (L) 59 (L)   INR 0.8 - 1.2   1.4 (H) 1.6 (H) 1.4 (H)   AST 10 - 38 U/L 75 (H) 56 (H) 48 (H)   ALT 16 - 61 U/L 55 42 31   Alk Phos 45 - 117 U/L 151 (H) 110 74   Bili, Total 0.2 - 1.0 MG/DL 2.8 (H) 2.8 (H) 3.4 (H)   Bili, Direct 0.0 - 0.2 MG/DL 1.0 (H) 1.0 (H) 1.1 (H)   Albumin 3.4 - 5.0 g/dL 2.9 (L) 2.6 (L) 3.1 (L)   BUN 7.0 - 18 MG/DL 9 10 11   Creat 0.6 - 1.3 MG/DL 0.87 0.69 0.90   Na 136 - 145 mmol/L 138 140 136   K 3.5 - 5.5 mmol/L 3.6 4.1 3.9   Cl 100 - 111 mmol/L 105 109 (H) 103   CO2 21 - 32 mmol/L 27 27 27 Glucose 74 - 99 mg/dL 80 213 (H) 99   Ammonia 11 - 32 UMOL/L 33 (H) 89 (H) 44 (H)     Cancer Screening Latest Ref Rng & Units 5/1/2019 2/7/2019   AFP, Serum 0.0 - 8.0 ng/mL 7.2 9.8 (H)   AFP-L3% 0.0 - 9.9 % 7.3 8.2     SEROLOGIES:  12/10:  HAV total positive, HB surface antigen negative, anti-HB surface negative, HCV genotype 1, HCV RNA log 5.5,    7/2011: Ferritin 73, iron saturation 57%. LIVER HISTOLOGY:  07/2018. TRANSIENT HEPATIC ELASTOGRAPHY:   E Range: 9.44-22.22 kPa  E Mean: 14.32 kPa  E Median: 14.68 kPa  E Std: 3.91 kPa    ENDOSCOPIC PROCEDURES:  9/2010:  Colonoscopy while hospitalized in [facility] - reported to demonstrate colonic polyps. 9/2010:  EGD while hospitalized in [facility] - reported to demonstrate severe gastritis from NSAIDs with small esophageal varices. 2/2011: EGD by [facility]. Esophageal varicies. Banding performed. 11/2011:  EGD by [facility]. Severe portal gastropathy. No varices. 4/2012:  EGD by [facility]:  Grade 2 esophageal varices. Banding performed. Severe hypertensive portal gastropathy. 8/2012:  EGD per [facility]. Small esophageal varices. Severe portal hypertensive gastropathy, gastritis of the antrum, no gastric varices. Repeat in 6 months. 10/2013. EGD by Dr Cherie Escamilla. Small varices with stigmata of bleeding. Portal gastropathy. 07/2014:  EGD per [facility]. 2 medium esophageal varices. Banding performed. Moderate portal hypertensive gastropathy. Repeat in one year. 9/1/2015: EGD per [facility]. Duodenal ulcer present. H. Pylori negative. Portal hypertensive gastropathy. Esophageal varices present. Four bands placed. Repeat in 6 months. 11/2015:  EGD per Dr. Umu Kirk. Moderate portal gastropathy. Grade 1 esophageal varices. No gastric varices noted. 11/2015:  Colonoscopy per Dr. Umu Kirk. Mild diverticulosis. 11/2016:  EGD per Dr. Marta Hoang. Small esophageal varices. Severe hypertensive portal gastropathy. Repeat in 6 months. 01/2018. EGD by Dr. Palmer Fraire. No report available. 10/2018. EGD by Dr. Venu Brown. Normal esophagus. Pathology negative. Repeat in 2 years. RADIOLOGY:  11/2010: Ultrasound of the liver. Echogenic consistent with cirrhosis. No liver mass lesions. No ascites. 2/2011: Ultrasound of the liver. Changes consistent with cirrhosis. No liver mass lesions. No ascites. No portal vein thrombosis. 11/2011: Ultrasound of the liver. Echogenic consistent with chronic liver disease, cirrhosis. No liver mass lesions. No ascites. 04/12:  CT of abdomen and pelvis - hepatic cirrhosis with portal hypertension with scattered upper abdominal collateralzation and varices. Likely recanalization of the umbilical artery. No mass noted. 1/2015:  Ultrasound of liver. Echogenic consistent with cirrhosis. No liver mass lesions. No dilated bile ducts. No ascites. 8/2015:  CT of the abdomen and pelvis. Coarsened, micronodular surface of the liver with widening of the interlobar fissure. No intrahepatic biliary ductal dilation. Stable enlargement of the portal vein with recanalization of the umbilical vein. Paraesophageal varices. Grossly stable splenomegaly  1/2016:  Ultrasound of liver. Echogenic consistent with cirrhosis. No liver mass lesions. No dilated bile ducts. No ascites. 11/2016:  Ultrasound of the liver. Echogenic consistent with cirrhosis. No liver mass lesions. No dilated bile ducts. No ascites. 05/2018. Abdominal ultrasound. Coarsened hepatic echogenicity consistent with hepatic parenchymal disease. No focal lesions or biliary dilatation demonstrated. 07/2018. Ultrasound of the liver. Cirrhotic hepatic morphology without focal hepatic lesion. 12/2018. Ultrasound of the liver. Stable right upper abdominal ultrasound, since 6-2018. Findings consistent with cirrhosis/chronic hepatocellular disease. Recanalized paraumbilical left hepatic vein. No perihepatic ascites. but without evidence of discrete focal mass  01/2019. Brain MRI.   \"Probable recent acute/subacute infarct involving the anterior aspect of the left temporal stem. No evidence of associated hemorrhage or mass effect. \"  02/2019. Brain MRI. [facility]). Findings consistent with acute infarcts in the thalamus bilaterally as well as the left basal ganglia and the medial aspect of the left temporal lobe. Subacute infarct in the right basal ganglia which does not have restricted diffusion. 03/2019. Abdominal CT w contrast. ([facility]). There is hepatic cirrhosis with diffuse nodular hepatic contour. Caudate lobe and left lobe liver are small.  No discrete hepatic mass. OTHER TESTING:  Not performed    Since the last office appointment the patient has:  Had no changes in the liver disease. Had an ischemic CVA (05/24/2019). Was admitted to [address] [place] [address] from 05/24/2019 to 05/30/2019, then to rehab. Has been home \"for about 4 weeks now\"  Is getting Home Health and home PT and speech. Ambulating with walker. ASSESSMENT AND PLAN:   Cirrhosis secondary to HCV. The HCV has been treated and cured. The most recent laboratory studies indicate that the AST is elevated, the ALT is normal, alkaline phosphatase is normal, tests of hepatic synthetic and metabolic function are depressed, and the platelet count is depressed. Will perform laboratory testing to monitor liver function and degree of liver injury. This will include hepatic panel, a CBC w/ diff, a BMP, a PT/INR, an ammonia level, and an AFP-L3%. Complications of cirrhosis were discussed in detail. We discussed thrombocytopenia, portal hypertension, varices, GI bleeding, peripheral edema, ascites, hepatic encephalopathy, and hepatocellular carcinoma. We discussed the need for follow ups on a regular basis, at 3 month intervals to monitor for complications. We discussed the need for every 6 month liver imaging studies. HCV treatment.   Mr. Kenny Mc completed twenty-four weeks of treatment with [facility] with good toleration in 09/2016. He remained HCV RNA undetectable one year post treatment. CVA. Follow with neurology as directed. Upper GI bleed appears to have been due to portal gastropathy. Repeat EGD was performed in 10/2018. Normal esophagus. Some gastritis of the atrium. Pathology is negative. Repeat EGD in 10/2020. Hepatic encephalopathy. Continues to have intermittent HE. Now taking lactulose TID. He is lucid today. I have ordered an ammonia level today. Lower extremity. The patient is currently on 40 mg lasix and 50 mg aldactone daily. Continue this dose. Edema well controlled. BUN/CRT are WNL. I have spoken to him and his daughter about the importance of salt restrictions. The patient was counseled regarding alcohol consumption. Vaccination for viral hepatitis B is recommended since the patient has no serologic evidence of previous exposure or vaccination with immunity. Vaccination for viral hepatitis A is not required. The patient has serologic evidence of prior exposure or vaccination with immunity. [address] screening will be performed. AFP ordered today. Abdominal ultrasound ordered. Thrombocytopenia secondary to cirrhosis from chronic HCV. No treatment necessary. [facility] 87 in 8 weeks. All of the above issues were discussed with the patient. All questions were answered. The patient expressed a clear understanding of the above.     SHANNAN Harris  Liver [place] of [address], [address] [place] [address] Elyse Cartagena, [address] [place] [address]   [phone]

## 2019-07-23 ENCOUNTER — HOME CARE VISIT (OUTPATIENT)
Dept: SCHEDULING | Facility: HOME HEALTH | Age: 64
End: 2019-07-23
Payer: MEDICARE

## 2019-07-23 VITALS
TEMPERATURE: 98.4 F | HEART RATE: 78 BPM | OXYGEN SATURATION: 92 % | SYSTOLIC BLOOD PRESSURE: 97 MMHG | DIASTOLIC BLOOD PRESSURE: 59 MMHG

## 2019-07-23 LAB
AFP L3 MFR SERPL: 7.6 % (ref 0–9.9)
AFP SERPL-MCNC: 6.9 NG/ML (ref 0–8)

## 2019-07-23 PROCEDURE — 3331090001 HH PPS REVENUE CREDIT

## 2019-07-23 PROCEDURE — 3331090002 HH PPS REVENUE DEBIT

## 2019-07-23 PROCEDURE — G0153 HHCP-SVS OF S/L PATH,EA 15MN: HCPCS

## 2019-07-24 ENCOUNTER — HOME CARE VISIT (OUTPATIENT)
Dept: SCHEDULING | Facility: HOME HEALTH | Age: 64
End: 2019-07-24
Payer: MEDICARE

## 2019-07-24 ENCOUNTER — TELEPHONE (OUTPATIENT)
Dept: HEMATOLOGY | Age: 64
End: 2019-07-24

## 2019-07-24 VITALS
SYSTOLIC BLOOD PRESSURE: 108 MMHG | DIASTOLIC BLOOD PRESSURE: 66 MMHG | HEART RATE: 82 BPM | OXYGEN SATURATION: 96 % | TEMPERATURE: 97.9 F

## 2019-07-24 PROCEDURE — 3331090001 HH PPS REVENUE CREDIT

## 2019-07-24 PROCEDURE — G0153 HHCP-SVS OF S/L PATH,EA 15MN: HCPCS

## 2019-07-24 PROCEDURE — 3331090002 HH PPS REVENUE DEBIT

## 2019-07-24 PROCEDURE — G0157 HHC PT ASSISTANT EA 15: HCPCS

## 2019-07-25 PROCEDURE — 3331090002 HH PPS REVENUE DEBIT

## 2019-07-25 PROCEDURE — 3331090001 HH PPS REVENUE CREDIT

## 2019-07-26 ENCOUNTER — HOME CARE VISIT (OUTPATIENT)
Dept: SCHEDULING | Facility: HOME HEALTH | Age: 64
End: 2019-07-26
Payer: MEDICARE

## 2019-07-26 PROCEDURE — 3331090001 HH PPS REVENUE CREDIT

## 2019-07-26 PROCEDURE — 3331090002 HH PPS REVENUE DEBIT

## 2019-07-26 PROCEDURE — G0157 HHC PT ASSISTANT EA 15: HCPCS

## 2019-07-27 PROCEDURE — 3331090002 HH PPS REVENUE DEBIT

## 2019-07-27 PROCEDURE — 3331090001 HH PPS REVENUE CREDIT

## 2019-07-28 PROCEDURE — 3331090001 HH PPS REVENUE CREDIT

## 2019-07-28 PROCEDURE — 3331090002 HH PPS REVENUE DEBIT

## 2019-07-29 PROCEDURE — 3331090002 HH PPS REVENUE DEBIT

## 2019-07-29 PROCEDURE — 3331090001 HH PPS REVENUE CREDIT

## 2019-07-30 ENCOUNTER — HOME CARE VISIT (OUTPATIENT)
Dept: SCHEDULING | Facility: HOME HEALTH | Age: 64
End: 2019-07-30
Payer: MEDICARE

## 2019-07-30 PROCEDURE — 3331090001 HH PPS REVENUE CREDIT

## 2019-07-30 PROCEDURE — G0157 HHC PT ASSISTANT EA 15: HCPCS

## 2019-07-30 PROCEDURE — 3331090002 HH PPS REVENUE DEBIT

## 2019-07-31 PROCEDURE — 3331090001 HH PPS REVENUE CREDIT

## 2019-07-31 PROCEDURE — 3331090002 HH PPS REVENUE DEBIT

## 2019-08-01 PROCEDURE — 3331090002 HH PPS REVENUE DEBIT

## 2019-08-01 PROCEDURE — 3331090001 HH PPS REVENUE CREDIT

## 2019-08-02 ENCOUNTER — HOME CARE VISIT (OUTPATIENT)
Dept: SCHEDULING | Facility: HOME HEALTH | Age: 64
End: 2019-08-02
Payer: MEDICARE

## 2019-08-02 VITALS
SYSTOLIC BLOOD PRESSURE: 95 MMHG | TEMPERATURE: 98.6 F | OXYGEN SATURATION: 97 % | DIASTOLIC BLOOD PRESSURE: 63 MMHG | HEART RATE: 76 BPM | RESPIRATION RATE: 16 BRPM

## 2019-08-02 PROCEDURE — G0151 HHCP-SERV OF PT,EA 15 MIN: HCPCS

## 2019-08-02 PROCEDURE — 3331090001 HH PPS REVENUE CREDIT

## 2019-08-02 PROCEDURE — 3331090003 HH PPS REVENUE ADJ

## 2019-08-02 PROCEDURE — 3331090002 HH PPS REVENUE DEBIT

## 2019-08-03 PROCEDURE — 3331090001 HH PPS REVENUE CREDIT

## 2019-08-03 PROCEDURE — 3331090002 HH PPS REVENUE DEBIT

## 2019-08-04 PROCEDURE — 3331090002 HH PPS REVENUE DEBIT

## 2019-08-04 PROCEDURE — 3331090001 HH PPS REVENUE CREDIT

## 2019-09-09 ENCOUNTER — HOSPITAL ENCOUNTER (OUTPATIENT)
Dept: ULTRASOUND IMAGING | Age: 64
Discharge: HOME | End: 2019-09-09
Payer: MEDICARE

## 2019-09-09 DIAGNOSIS — K74.60 CIRRHOSIS OF LIVER WITHOUT ASCITES, UNSPECIFIED HEPATIC CIRRHOSIS TYPE (HCC): ICD-10-CM

## 2019-09-09 PROCEDURE — 76705 ECHO EXAM OF ABDOMEN: CPT

## 2019-09-25 ENCOUNTER — HOSPITAL ENCOUNTER (OUTPATIENT)
Dept: LAB | Age: 64
Discharge: HOME | End: 2019-09-25
Payer: MEDICARE

## 2019-09-25 ENCOUNTER — OFFICE VISIT (OUTPATIENT)
Dept: HEMATOLOGY | Age: 64
End: 2019-09-25

## 2019-09-25 VITALS
OXYGEN SATURATION: 95 % | WEIGHT: 207 LBS | BODY MASS INDEX: 28.98 KG/M2 | SYSTOLIC BLOOD PRESSURE: 105 MMHG | DIASTOLIC BLOOD PRESSURE: 66 MMHG | TEMPERATURE: 98.1 F | HEART RATE: 83 BPM | RESPIRATION RATE: 18 BRPM | HEIGHT: 71 IN

## 2019-09-25 DIAGNOSIS — K74.60 CIRRHOSIS OF LIVER WITHOUT ASCITES, UNSPECIFIED HEPATIC CIRRHOSIS TYPE (HCC): ICD-10-CM

## 2019-09-25 DIAGNOSIS — K74.60 CIRRHOSIS OF LIVER WITHOUT ASCITES, UNSPECIFIED HEPATIC CIRRHOSIS TYPE (HCC): Primary | ICD-10-CM

## 2019-09-25 LAB
ALBUMIN SERPL-MCNC: 3 G/DL (ref 3.4–5)
ALBUMIN/GLOB SERPL: 0.8 {RATIO} (ref 0.8–1.7)
ALP SERPL-CCNC: 135 U/L (ref 45–117)
ALT SERPL-CCNC: 44 U/L (ref 16–61)
AMMONIA PLAS-SCNC: 102 UMOL/L (ref 11–32)
ANION GAP SERPL CALC-SCNC: 5 MMOL/L (ref 3–18)
AST SERPL-CCNC: 58 U/L (ref 10–38)
BASOPHILS # BLD: 0 K/UL (ref 0–0.1)
BASOPHILS NFR BLD: 0 % (ref 0–2)
BILIRUB DIRECT SERPL-MCNC: 1 MG/DL (ref 0–0.2)
BILIRUB SERPL-MCNC: 2.8 MG/DL (ref 0.2–1)
BUN SERPL-MCNC: 12 MG/DL (ref 7–18)
BUN/CREAT SERPL: 15 (ref 12–20)
CALCIUM SERPL-MCNC: 8.7 MG/DL (ref 8.5–10.1)
CHLORIDE SERPL-SCNC: 103 MMOL/L (ref 100–111)
CO2 SERPL-SCNC: 31 MMOL/L (ref 21–32)
CREAT SERPL-MCNC: 0.8 MG/DL (ref 0.6–1.3)
DIFFERENTIAL METHOD BLD: ABNORMAL
EOSINOPHIL # BLD: 0.2 K/UL (ref 0–0.4)
EOSINOPHIL NFR BLD: 4 % (ref 0–5)
ERYTHROCYTE [DISTWIDTH] IN BLOOD BY AUTOMATED COUNT: 15.3 % (ref 11.6–14.5)
GLOBULIN SER CALC-MCNC: 4 G/DL (ref 2–4)
GLUCOSE SERPL-MCNC: 77 MG/DL (ref 74–99)
HCT VFR BLD AUTO: 40.6 % (ref 36–48)
HGB BLD-MCNC: 13.6 G/DL (ref 13–16)
INR PPP: 1.4 (ref 0.8–1.2)
LYMPHOCYTES # BLD: 0.4 K/UL (ref 0.9–3.6)
LYMPHOCYTES NFR BLD: 8 % (ref 21–52)
MCH RBC QN AUTO: 33.3 PG (ref 24–34)
MCHC RBC AUTO-ENTMCNC: 33.5 G/DL (ref 31–37)
MCV RBC AUTO: 99.5 FL (ref 74–97)
MONOCYTES # BLD: 0.6 K/UL (ref 0.05–1.2)
MONOCYTES NFR BLD: 11 % (ref 3–10)
NEUTS SEG # BLD: 4.5 K/UL (ref 1.8–8)
NEUTS SEG NFR BLD: 77 % (ref 40–73)
PLATELET # BLD AUTO: 55 K/UL (ref 135–420)
PMV BLD AUTO: 10.7 FL (ref 9.2–11.8)
POTASSIUM SERPL-SCNC: 4.4 MMOL/L (ref 3.5–5.5)
PROT SERPL-MCNC: 7 G/DL (ref 6.4–8.2)
PROTHROMBIN TIME: 17.2 SEC (ref 11.5–15.2)
RBC # BLD AUTO: 4.08 M/UL (ref 4.7–5.5)
SODIUM SERPL-SCNC: 139 MMOL/L (ref 136–145)
WBC # BLD AUTO: 5.8 K/UL (ref 4.6–13.2)

## 2019-09-25 PROCEDURE — 80048 BASIC METABOLIC PNL TOTAL CA: CPT

## 2019-09-25 PROCEDURE — 85025 COMPLETE CBC W/AUTO DIFF WBC: CPT

## 2019-09-25 PROCEDURE — 82140 ASSAY OF AMMONIA: CPT

## 2019-09-25 PROCEDURE — 80076 HEPATIC FUNCTION PANEL: CPT

## 2019-09-25 PROCEDURE — 82107 ALPHA-FETOPROTEIN L3: CPT

## 2019-09-25 PROCEDURE — 85610 PROTHROMBIN TIME: CPT

## 2019-09-25 PROCEDURE — 36415 COLL VENOUS BLD VENIPUNCTURE: CPT

## 2019-09-25 RX ORDER — FUROSEMIDE 40 MG/1
40 TABLET ORAL DAILY
Qty: 90 TAB | Refills: 3 | Status: SHIPPED | OUTPATIENT
Start: 2019-09-25

## 2019-09-25 RX ORDER — SPIRONOLACTONE 50 MG/1
50 TABLET, FILM COATED ORAL DAILY
Qty: 90 TAB | Refills: 3 | Status: SHIPPED | OUTPATIENT
Start: 2019-09-25 | End: 2020-06-22 | Stop reason: ALTCHOICE

## 2019-09-25 NOTE — PROGRESS NOTES
Hailey Lamas is a 62 y.o. male      1. Have you been to the ER, urgent care clinic or hospitalized since your last visit? YES. Patient has been to [place] ED since last visit for stroke. 2. Have you seen or consulted any other health care providers outside of the [address] [place] [address] since your last visit (Include any pap smears or colon screening)? YES      Patient has been to in home rehabilitation for post stroke rehab.        Learning Assessment 1/30/2019   PRIMARY LEARNER Patient   BARRIERS PRIMARY LEARNER NONE   CO-LEARNER CAREGIVER No   PRIMARY LANGUAGE ENGLISH   LEARNER PREFERENCE PRIMARY LISTENING   ANSWERED BY PATIENT   RELATIONSHIP SELF

## 2019-09-25 NOTE — PROGRESS NOTES
[address] [place] [address], MD, MD Brittny Knight PA-C Kendrick Landmark, [facility]     Jaimie Gunderson, [facility]   SHANNAN Theodore, [facility]       Holland Deput[address]    at [address], [address] [place] [address], [facility] 22.    [phone]    FAX: [address] [place] [address], [address], [address] [place] [address],[address]    [facility] [address]: [phone]       Patient Care Team:  Sebastien Stevenson MD as PCP - General (Internal Medicine)  Bienvenido Steele MD (Neurology)      Problem List  Date Reviewed: 7/22/2019          Codes Class Noted    Hepatic encephalopathy [facility]) ICD-10-CM: K72.90  ICD-9-CM: 572.2  2/1/2019        CVA (cerebral vascular accident) [facility]) ICD-10-CM: I63.9  ICD-9-CM: 434.91  1/30/2019        Elevated INR ICD-10-CM: R79.1  ICD-9-CM: 790.92  1/30/2019        Back pain ICD-10-CM: M54.9  ICD-9-CM: 724.5  1/30/2019        Chest pressure ICD-10-CM: R07.89  ICD-9-CM: 786.59  1/30/2019        Cellulitis ICD-10-CM: L03.90  ICD-9-CM: 682.9  7/24/2018        Portal hypertensive gastropathy ([facility] ICD-10-CM: K76.6, K31.89  ICD-9-CM: 572.3, 537.89  12/22/2013        Cirrhosis ([facility] ICD-10-CM: K74.60  ICD-9-CM: 571.5  9/5/2011        Upper GI bleed secondary to NSAID induced gastritis - 9/2010 ICD-10-CM: K92.2  ICD-9-CM: 578.9  9/5/2011        S/P lumbar laminectomy with spinal fusion - 1992 ICD-10-CM: Z98.1  ICD-9-CM: V45.4  9/5/2011        Colon polyps ICD-10-CM: K63.5  ICD-9-CM: 211.3  9/5/2011    Overview Signed 9/5/2011 10:38 AM by Patricia Melendez MD     Last colonoscopy 9/2010             Esophageal varices ([facility] ICD-10-CM: I85.00  ICD-9-CM: 456.1  9/5/2011        Thrombocytopenia secondary to cirrhosis ICD-10-CM: D69.6  ICD-9-CM: 287.5  9/5/2011        Chronic hepatitis C genotype 1 ICD-10-CM: B18.2  ICD-9-CM: 070.54  Unknown              Iggy Vaaldez returns to the [facility] for 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 The HCV has been treated and cured. The active problem list, all pertinent past medical history, medications, liver histology, endoscopic studies, radiologic findings and laboratory findings related to the liver disorder were reviewed with the patient. The patient was seen here in 01/30/2019. He went to the ER after that appointment because he was complaining of blurred vision, lightheadedness, and headache. He was diagnosed with CVA. Mr. Ro Jerez suffered a severe ischemic CVA on 02/15/2019 and was at [facility] for 5 days. He then was discharged to [facility] where he underwent rehab for 2 weeks. After that, he spent another 2 weeks at [facility]. The patient suffered yet another CVA on 05/24/2019. Hospitalized for one week then to rehab. He has been home about four weeks now and is receiving home health care, speech therapy, and physical therapy at home. He was again admitted to [facility] from 08/16 to 08/19/2019 with a TIA. Discharged home with home health. Ambulating with a walker today. The patient has esophageal varices without bleeding. His previous UGI bleed was from portal gastropathy. Last EGD was performed in 10/2018. Mr. Ro Jerez peripheral edema is well controlled with 40 mg lasix and 50 mg aldactone daily. He is currently taking lactulose and tolerating this without complaint. Xifaxan has been ordered, but the patient cannot afford the medication. Mr. Ro Jerez participated in the Astr[employer] B clinical trial for the treatment of HCV. Unfortunately he relapsed 12 weeks post treatment. Mr. Ro Jerez completed twenty-four weeks of treatment with Estella Mas in 2015.   The HCV has been treated and cured. The patient has no complaints which can be attributed to liver disease. The patient has not experienced fatigue, fevers, chills, shortness of breath, chest pain, pain in the right side over the liver, diffuse abdominal pain, nausea, vomiting, constipation, diarrhrea, dry eyes, dry mouth, arthralgias, myalgias, yellowing of the eyes or skin, itching, dark urine, problems concentrating, hematemesis, or hematochezia. ALLERGIES:  Allergies   Allergen Reactions    Aspirin Other (comments)     Bleeding      Valium [Diazepam] Swelling     Current Outpatient Medications   Medication Sig    rifAXIMin (XIFAXAN) 550 mg tablet Take 1 Tab by mouth two (2) times a day. Indications: impaired brain function due to liver disease    furosemide (LASIX) 40 mg tablet Take 40 mg by mouth daily.  escitalopram oxalate (LEXAPRO) 10 mg tablet Take 10 mg by mouth daily.  spironolactone (ALDACTONE) 50 mg tablet Take  by mouth daily.  pantoprazole (PROTONIX) 40 mg tablet Take 1 Tab by mouth daily.  acetaminophen (TYLENOL) 325 mg tablet Take 325 mg by mouth every six (6) hours as needed for Fever or Pain.  aspirin delayed-release 81 mg tablet Take 81 mg by mouth daily.  cyanocobalamin (VITAMIN B12) 500 mcg tablet Take 500 mcg by mouth daily.  rifAXIMin (XIFAXAN) 550 mg tablet Take 1 Tab by mouth two (2) times a day. (Patient not taking: Reported on 6/28/2019)    atorvastatin (LIPITOR) 10 mg tablet Take 1 Tab by mouth daily.  lactulose (CHRONULAC) 10 gram/15 mL solution Take 20 g by mouth three (3) times daily. Indications: Hepatic Encephalopathy     No current facility-administered medications for this visit. SYSTEM REVIEW NOT RELATED TO LIVER DISEASE OR REVIEWED ABOVE:  Constitution systems: Negative for fever, chills, weight loss. Eyes: Negative for visual changes. ENT: Negative for sore throat, painful swallowing. Respiratory: Negative for cough, hemoptysis, SOB. Cardiology: Negative for chest pain, palpitations. GI:  Negative for constipation or diarrhea. : Negative for urinary frequency, dysuria, hematuria, nocturia. Skin: Negative for rash. Hematology: Negative for easy bruising, blood clots. Musculo-skelatal: Negative for back pain, muscle pain, weakness. Neurologic: Negative for headaches, dizziness, vertigo, memory problems not related to HE. Psychology: Negative for anxiety, depression. FAMILY/SOCIAL HISTORY:  The patient is . There are 3 children. The patient smokes tobacco. The patient has been abstinent from alcohol since 2007. The patient is currently on disability. PHYSICAL EXAMINATION:    Visit Vitals  /66 (BP 1 Location: Right arm, BP Patient Position: Sitting)   Pulse 83   Temp 98.1 °F (36.7 °C) (Tympanic)   Resp 18   Ht 5' 11\" (1.803 m)   Wt 207 lb (93.9 kg)   SpO2 95%   BMI 28.87 kg/m²       General: No acute distress. Eyes: Sclera anicteric. ENT: No oral lesions, thyroid normal.  Nodes: No adenopathy. Skin: Spider angiomata on chest, palmar erythema. Respiratory: Lungs clear to auscultation. Cardiovascular: Regular heart rate, no murmurs, no JVD. Abdomen: Soft non-tender, liver enlarged. Spleen not palpable. No obvious ascites. Umbilical hernia present. Extremities: Trace bilateral pedal edema. No muscle wasting. No gross arthritic changes. Neurologic: Alert and oriented, cranial nerves grossly intact, no asterixsis.     LABORATORY STUDIES:  [facility] [place] of [address] [place] [address] 9/25/2019 7/22/2019 5/1/2019   WBC 4.6 - 13.2 K/uL 5.8 6.4 3.3 (L)   ANC 1.8 - 8.0 K/UL 4.5 4.8 2.6   HGB 13.0 - 16.0 g/dL 13.6 13.1 12.7 (L)    - 420 K/uL 55 (L) 60 (L) 34 (L)   INR 0.8 - 1.2   1.4 (H) 1.4 (H) 1.6 (H)   AST 10 - 38 U/L 58 (H) 75 (H) 56 (H)   ALT 16 - 61 U/L 44 55 42   Alk Phos 45 - 117 U/L 135 (H) 151 (H) 110   Bili, Total 0.2 - 1.0 MG/DL 2.8 (H) 2.8 (H) 2.8 (H)   Bili, Direct 0.0 - 0.2 MG/DL 1.0 (H) 1.0 (H) 1.0 (H)   Albumin 3.4 - 5.0 g/dL 3.0 (L) 2.9 (L) 2.6 (L)   BUN 7.0 - 18 MG/DL 12 9 10   Creat 0.6 - 1.3 MG/DL 0.80 0.87 0.69   Na 136 - 145 mmol/L 139 138 140   K 3.5 - 5.5 mmol/L 4.4 3.6 4.1   Cl 100 - 111 mmol/L 103 105 109 (H)   CO2 21 - 32 mmol/L 31 27 27   Glucose 74 - 99 mg/dL 77 80 213 (H)   Ammonia 11 - 32 UMOL/L 102 (H) 33 (H) 89 (H)     Cancer Screening Latest Ref Rng & Units 9/25/2019 7/22/2019 5/1/2019   AFP, Serum 0.0 - 8.0 ng/mL 10.4 (H) 6.9 7.2   AFP-L3% 0.0 - 9.9 % 6.4 7.6 7.3     SEROLOGIES:  12/10:  HAV total positive, HB surface antigen negative, anti-HB surface negative, HCV genotype 1, HCV RNA log 5.5,    7/2011: Ferritin 73, iron saturation 57%. LIVER HISTOLOGY:  07/2018. TRANSIENT HEPATIC ELASTOGRAPHY:   E Range: 9.44-22.22 kPa  E Mean: 14.32 kPa  E Median: 14.68 kPa  E Std: 3.91 kPa    ENDOSCOPIC PROCEDURES:  9/2010:  Colonoscopy while hospitalized in [facility] - reported to demonstrate colonic polyps. 9/2010:  EGD while hospitalized in [facility] - reported to demonstrate severe gastritis from NSAIDs with small esophageal varices. 2/2011: EGD by [facility]. Esophageal varicies. Banding performed. 11/2011:  EGD by [facility]. Severe portal gastropathy. No varices. 4/2012:  EGD by [facility]:  Grade 2 esophageal varices. Banding performed. Severe hypertensive portal gastropathy. 8/2012:  EGD per [facility]. Small esophageal varices. Severe portal hypertensive gastropathy, gastritis of the antrum, no gastric varices. Repeat in 6 months. 10/2013. EGD by Dr Stephen Barba. Small varices with stigmata of bleeding. Portal gastropathy. 07/2014:  EGD per [facility]. 2 medium esophageal varices. Banding performed. Moderate portal hypertensive gastropathy. Repeat in one year. 9/1/2015: EGD per [facility]. Duodenal ulcer present. H. Pylori negative. Portal hypertensive gastropathy. Esophageal varices present. Four bands placed. Repeat in 6 months. 11/2015:  EGD per Dr. Tono Preciado.   Moderate portal gastropathy. Grade 1 esophageal varices. No gastric varices noted. 11/2015:  Colonoscopy per Dr. Nickolas Grove. Mild diverticulosis. 11/2016:  EGD per Dr. Nati Duron. Small esophageal varices. Severe hypertensive portal gastropathy. Repeat in 6 months. 01/2018. EGD by Dr. Francesca Oro. No report available. 10/2018. EGD by Dr. Yumiko Gilmore. Normal esophagus. Pathology negative. Repeat in 2 years. RADIOLOGY:  11/2010: Ultrasound of the liver. Echogenic consistent with cirrhosis. No liver mass lesions. No ascites. 2/2011: Ultrasound of the liver. Changes consistent with cirrhosis. No liver mass lesions. No ascites. No portal vein thrombosis. 11/2011: Ultrasound of the liver. Echogenic consistent with chronic liver disease, cirrhosis. No liver mass lesions. No ascites. 04/12:  CT of abdomen and pelvis - hepatic cirrhosis with portal hypertension with scattered upper abdominal collateralzation and varices. Likely recanalization of the umbilical artery. No mass noted. 1/2015:  Ultrasound of liver. Echogenic consistent with cirrhosis. No liver mass lesions. No dilated bile ducts. No ascites. 8/2015:  CT of the abdomen and pelvis. Coarsened, micronodular surface of the liver with widening of the interlobar fissure. No intrahepatic biliary ductal dilation. Stable enlargement of the portal vein with recanalization of the umbilical vein. Paraesophageal varices. Grossly stable splenomegaly  1/2016:  Ultrasound of liver. Echogenic consistent with cirrhosis. No liver mass lesions. No dilated bile ducts. No ascites. 11/2016:  Ultrasound of the liver. Echogenic consistent with cirrhosis. No liver mass lesions. No dilated bile ducts. No ascites. 05/2018. Abdominal ultrasound. Coarsened hepatic echogenicity consistent with hepatic parenchymal disease. No focal lesions or biliary dilatation demonstrated. 07/2018. Ultrasound of the liver.  Cirrhotic hepatic morphology without focal hepatic lesion. 12/2018. Ultrasound of the liver. Stable right upper abdominal ultrasound, since 6-2018. Findings consistent with cirrhosis/chronic hepatocellular disease. Recanalized paraumbilical left hepatic vein. No perihepatic ascites. but without evidence of discrete focal mass  01/2019. Brain MRI. \"Probable recent acute/subacute infarct involving the anterior aspect of the left temporal stem. No evidence of associated hemorrhage or mass effect. \"  02/2019. Brain MRI. [facility]). Findings consistent with acute infarcts in the thalamus bilaterally as well as the left basal ganglia and the medial aspect of the left temporal lobe. Subacute infarct in the right basal ganglia which does not have restricted diffusion. 03/2019. Abdominal CT w contrast. ([facility]). There is hepatic cirrhosis with diffuse nodular hepatic contour. Caudate lobe and left lobe liver are small.  No discrete hepatic mass. 09/2019. Ultrasound of the liver. Coarse and heterogeneous hepatic echotexture and nodular contour compatible with reported history of cirrhosis. No discrete hepatic mass identified. Dilated portal vein and recanalized umbilical veins consistent with portal hypertension. OTHER TESTING:  Not performed    Since the last office appointment the patient has:  Had no changes in the liver disease. Suffered yet another CVA in 08/2019. Back to [facility]   08/16/2019-08/19/2019. Is getting Home Health and home PT and speech. ASSESSMENT AND PLAN:   Cirrhosis secondary to HCV. The HCV has been treated and cured. The most recent laboratory studies indicate that the AST is elevated, the ALT is normal, alkaline phosphatase is normal, tests of hepatic synthetic and metabolic function are depressed, and the platelet count is depressed. Will perform laboratory testing to monitor liver function and degree of liver injury.  This will include hepatic panel, a CBC w/ diff, a BMP, a PT/INR, an ammonia level, and an AFP-L3%. Complications of cirrhosis were discussed in detail. We discussed thrombocytopenia, portal hypertension, varices, GI bleeding, peripheral edema, ascites, hepatic encephalopathy, and hepatocellular carcinoma. We discussed the need for follow ups on at regular intervals to monitor for complications. We discussed the need for every 6 month liver imaging studies. The patient has multiple ongoing medical issues    HCV treatment. Mr. Ruddy Montalvo completed twenty-four weeks of treatment with [facility] with good toleration in 09/2016. He remained HCV RNA undetectable one year post treatment. CVA. Follow with neurology as directed. Upper GI bleed appears to have been due to portal gastropathy. Repeat EGD was performed in 10/2018. Normal esophagus. Some gastritis of the atrium. Pathology is negative. Repeat EGD in 10/2020. Hepatic encephalopathy. Continues to have intermittent HE. Now taking lactulose TID. He is lucid today. I have ordered an ammonia level today. Lower extremity. The patient is currently on 40 mg lasix and 50 mg aldactone daily. Continue this dose. Edema well controlled. BUN/CRT are WNL. I have spoken to him and his daughter about the importance of salt restrictions. The patient was counseled regarding alcohol consumption. Vaccination for viral hepatitis B is recommended since the patient has no serologic evidence of previous exposure or vaccination with immunity. Vaccination for viral hepatitis A is not required. The patient has serologic evidence of prior exposure or vaccination with immunity. [facility]. AFP ordered today. Abdominal ultrasound ordered. Thrombocytopenia secondary to cirrhosis from chronic HCV. No treatment necessary. [facility] 87 in 4 months. All of the above issues were discussed with the patient. All questions were answered.   The patient expressed a clear understanding of the above.     ELISABET AlejandreP-C  [facility] [place] of [address] [place] [address],B-[address]  [address], [address] [place] [address]   Mar Varghese, [address] [place] [address]   [phone]

## 2019-09-26 LAB
AFP L3 MFR SERPL: 6.4 % (ref 0–9.9)
AFP SERPL-MCNC: 10.4 NG/ML (ref 0–8)

## 2019-11-11 RX ORDER — FUROSEMIDE 80 MG/1
TABLET ORAL
Qty: 90 TAB | Refills: 1 | Status: SHIPPED | OUTPATIENT
Start: 2019-11-11 | End: 2020-01-28

## 2020-01-28 ENCOUNTER — OFFICE VISIT (OUTPATIENT)
Dept: HEMATOLOGY | Age: 65
End: 2020-01-28

## 2020-01-28 ENCOUNTER — HOSPITAL ENCOUNTER (OUTPATIENT)
Dept: LAB | Age: 65
Discharge: HOME | End: 2020-01-28
Payer: MEDICARE

## 2020-01-28 VITALS
DIASTOLIC BLOOD PRESSURE: 71 MMHG | OXYGEN SATURATION: 92 % | HEIGHT: 71 IN | BODY MASS INDEX: 31.22 KG/M2 | SYSTOLIC BLOOD PRESSURE: 117 MMHG | TEMPERATURE: 97.7 F | WEIGHT: 223 LBS | HEART RATE: 77 BPM

## 2020-01-28 DIAGNOSIS — K74.60 CIRRHOSIS OF LIVER WITHOUT ASCITES, UNSPECIFIED HEPATIC CIRRHOSIS TYPE (HCC): ICD-10-CM

## 2020-01-28 DIAGNOSIS — Z86.19 HEPATITIS C VIRUS INFECTION CURED AFTER ANTIVIRAL DRUG THERAPY: ICD-10-CM

## 2020-01-28 DIAGNOSIS — K74.60 CIRRHOSIS OF LIVER WITHOUT ASCITES, UNSPECIFIED HEPATIC CIRRHOSIS TYPE (HCC): Primary | ICD-10-CM

## 2020-01-28 LAB
ALBUMIN SERPL-MCNC: 2.7 G/DL (ref 3.4–5)
ALBUMIN/GLOB SERPL: 0.8 {RATIO} (ref 0.8–1.7)
ALP SERPL-CCNC: 150 U/L (ref 45–117)
ALT SERPL-CCNC: 44 U/L (ref 16–61)
AMMONIA PLAS-SCNC: 56 UMOL/L (ref 11–32)
ANION GAP SERPL CALC-SCNC: 3 MMOL/L (ref 3–18)
AST SERPL-CCNC: 51 U/L (ref 10–38)
BASOPHILS # BLD: 0 K/UL (ref 0–0.1)
BASOPHILS NFR BLD: 0 % (ref 0–2)
BILIRUB DIRECT SERPL-MCNC: 1 MG/DL (ref 0–0.2)
BILIRUB SERPL-MCNC: 2.6 MG/DL (ref 0.2–1)
BUN SERPL-MCNC: 12 MG/DL (ref 7–18)
BUN/CREAT SERPL: 16 (ref 12–20)
CALCIUM SERPL-MCNC: 8.2 MG/DL (ref 8.5–10.1)
CHLORIDE SERPL-SCNC: 106 MMOL/L (ref 100–111)
CO2 SERPL-SCNC: 31 MMOL/L (ref 21–32)
CREAT SERPL-MCNC: 0.77 MG/DL (ref 0.6–1.3)
DIFFERENTIAL METHOD BLD: ABNORMAL
EOSINOPHIL # BLD: 0.1 K/UL (ref 0–0.4)
EOSINOPHIL NFR BLD: 3 % (ref 0–5)
ERYTHROCYTE [DISTWIDTH] IN BLOOD BY AUTOMATED COUNT: 15.4 % (ref 11.6–14.5)
GLOBULIN SER CALC-MCNC: 3.5 G/DL (ref 2–4)
GLUCOSE SERPL-MCNC: 100 MG/DL (ref 74–99)
HCT VFR BLD AUTO: 37.7 % (ref 36–48)
HGB BLD-MCNC: 12.7 G/DL (ref 13–16)
INR PPP: 1.6 (ref 0.8–1.2)
LYMPHOCYTES # BLD: 0.3 K/UL (ref 0.9–3.6)
LYMPHOCYTES NFR BLD: 7 % (ref 21–52)
MCH RBC QN AUTO: 32.9 PG (ref 24–34)
MCHC RBC AUTO-ENTMCNC: 33.7 G/DL (ref 31–37)
MCV RBC AUTO: 97.7 FL (ref 74–97)
MONOCYTES # BLD: 0.4 K/UL (ref 0.05–1.2)
MONOCYTES NFR BLD: 9 % (ref 3–10)
NEUTS SEG # BLD: 3.4 K/UL (ref 1.8–8)
NEUTS SEG NFR BLD: 81 % (ref 40–73)
PLATELET # BLD AUTO: 42 K/UL (ref 135–420)
PMV BLD AUTO: 11 FL (ref 9.2–11.8)
POTASSIUM SERPL-SCNC: 4.3 MMOL/L (ref 3.5–5.5)
PROT SERPL-MCNC: 6.2 G/DL (ref 6.4–8.2)
PROTHROMBIN TIME: 19.1 SEC (ref 11.5–15.2)
RBC # BLD AUTO: 3.86 M/UL (ref 4.7–5.5)
SODIUM SERPL-SCNC: 140 MMOL/L (ref 136–145)
WBC # BLD AUTO: 4.3 K/UL (ref 4.6–13.2)

## 2020-01-28 PROCEDURE — 85610 PROTHROMBIN TIME: CPT

## 2020-01-28 PROCEDURE — 85025 COMPLETE CBC W/AUTO DIFF WBC: CPT

## 2020-01-28 PROCEDURE — 80076 HEPATIC FUNCTION PANEL: CPT

## 2020-01-28 PROCEDURE — 82107 ALPHA-FETOPROTEIN L3: CPT

## 2020-01-28 PROCEDURE — 36415 COLL VENOUS BLD VENIPUNCTURE: CPT

## 2020-01-28 PROCEDURE — 80048 BASIC METABOLIC PNL TOTAL CA: CPT

## 2020-01-28 PROCEDURE — 82140 ASSAY OF AMMONIA: CPT

## 2020-01-28 NOTE — PROGRESS NOTES
[address] [facility], [place], [address], Cite Suly Mcguire, MD Rinku Mitchell, PA-MARIA TERESA Herrera, [facility]     Jamiie Gunderson, [facility]   Hesham Carlin, FNP-C    Mickey Larsen, [facility]       [address]    at [address], [address] [place] [address], [facility] 22.    [phone]    FAX: [address] [place] [address] Avenue    at [address], [address] [place] [address],[address]    [address] [facility] [address]: [phone]       Patient Care Team:  Jerry Boswell MD as PCP - General (Internal Medicine)  Maxine Lee MD (Neurology)  Sully Browne MD (Psychiatry)      Problem List  Date Reviewed: 1/28/2020          Codes Class Noted    Hepatic encephalopathy [facility]) ICD-10-CM: K72.90  ICD-9-CM: 572.2  2/1/2019        CVA (cerebral vascular accident) [facility]) ICD-10-CM: I63.9  ICD-9-CM: 434.91  1/30/2019        Elevated INR ICD-10-CM: R79.1  ICD-9-CM: 790.92  1/30/2019        Back pain ICD-10-CM: M54.9  ICD-9-CM: 724.5  1/30/2019        Chest pressure ICD-10-CM: R07.89  ICD-9-CM: 786.59  1/30/2019        Cellulitis ICD-10-CM: L03.90  ICD-9-CM: 682.9  7/24/2018        Portal hypertensive gastropathy ([facility] ICD-10-CM: K76.6, K31.89  ICD-9-CM: 572.3, 537.89  12/22/2013        Cirrhosis ([facility] ICD-10-CM: K74.60  ICD-9-CM: 571.5  9/5/2011        Upper GI bleed secondary to NSAID induced gastritis - 9/2010 ICD-10-CM: K92.2  ICD-9-CM: 578.9  9/5/2011        S/P lumbar laminectomy with spinal fusion - 1992 ICD-10-CM: Z98.1  ICD-9-CM: V45.4  9/5/2011        Colon polyps ICD-10-CM: K63.5  ICD-9-CM: 211.3  9/5/2011    Overview Signed 9/5/2011 10:38 AM by La Macias MD     Last colonoscopy 9/2010             Esophageal varices ([facility] ICD-10-CM: I85.00  ICD-9-CM: 456.1  9/5/2011 Thrombocytopenia secondary to cirrhosis ICD-10-CM: D69.6  ICD-9-CM: 287.5  9/5/2011        Chronic hepatitis C genotype 1 ICD-10-CM: B18.2  ICD-9-CM: 070.54  Unknown              Terry Luna returns to the [facility] for 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 The HCV has been treated and cured. The active problem list, all pertinent past medical history, medications, liver histology, endoscopic studies, radiologic findings and laboratory findings related to the liver disorder were reviewed with the patient. The patient was seen here in 01/30/2019. He went to the ER after that appointment because he was complaining of blurred vision, lightheadedness, and headache. He was diagnosed with CVA. Mr. Laisha Raymond suffered a severe ischemic CVA on 02/15/2019 and was at [facility] for 5 days. He then was discharged to [facility] where he underwent rehab for 2 weeks. After that, he spent another 2 weeks at [facility]. The patient suffered yet another CVA on 05/24/2019. Hospitalized for one week then to rehab. He was again admitted to [facility] from 08/16 to 08/19/2019 with a TIA. Discharged home with home health. Presents in a wheelchair today. He reports that he is able to ambulate short distances. The patient has esophageal varices without bleeding. His previous UGI bleed was from portal gastropathy. Last EGD was performed in 10/2018. Mr. Laisha Raymond peripheral edema is well controlled with 40 mg lasix and 50 mg aldactone daily. He is currently taking lactulose and tolerating this without complaint. Xifaxan has been ordered, but the patient cannot afford the medication. Mr. Laisha Raymond participated in the Astral B clinical trial for the treatment of HCV. Unfortunately he relapsed 12 weeks post treatment. The patient has no complaints which can be attributed to liver disease.     The patient has not experienced fatigue, fevers, chills, shortness of breath, chest pain, pain in the right side over the liver, diffuse abdominal pain, nausea, vomiting, constipation, diarrhrea, dry eyes, dry mouth, arthralgias, myalgias, yellowing of the eyes or skin, itching, dark urine, problems concentrating, hematemesis, or hematochezia. Since the last office appointment the patient:  Had no changes in the liver disease. No longer in PT or rehab. Daughter is getting home health nurse for 2 hours a day starting tomorrow. ALLERGIES:  Allergies   Allergen Reactions    Aspirin Other (comments)     Bleeding      Valium [Diazepam] Swelling     Current Outpatient Medications   Medication Sig    furosemide (LASIX) 40 mg tablet Take 1 Tab by mouth daily.  spironolactone (ALDACTONE) 50 mg tablet Take 1 Tab by mouth daily.  pantoprazole (PROTONIX) 40 mg tablet Take 1 Tab by mouth daily.  aspirin delayed-release 81 mg tablet Take 325 mg by mouth daily.  cyanocobalamin (VITAMIN B12) 500 mcg tablet Take 500 mcg by mouth daily.  rifAXIMin (XIFAXAN) 550 mg tablet Take 1 Tab by mouth two (2) times a day.  atorvastatin (LIPITOR) 10 mg tablet Take 1 Tab by mouth daily.  lactulose (CHRONULAC) 10 gram/15 mL solution Take 20 g by mouth three (3) times daily. Indications: Hepatic Encephalopathy    acetaminophen (TYLENOL) 325 mg tablet Take 325 mg by mouth every six (6) hours as needed for Fever or Pain. No current facility-administered medications for this visit. SYSTEM REVIEW NOT RELATED TO LIVER DISEASE OR REVIEWED ABOVE:  Constitution systems: Negative for fever, chills, weight loss. Eyes: Negative for visual changes. ENT: Negative for sore throat, painful swallowing. Respiratory: Negative for cough, hemoptysis, SOB. Cardiology: Negative for chest pain, palpitations. GI:  Negative for constipation or diarrhea. : Negative for urinary frequency, dysuria, hematuria, nocturia. Skin: Negative for rash.   Hematology: Negative for easy bruising, blood clots. Musculo-skelatal: Negative for back pain, muscle pain, weakness. Neurologic: Negative for headaches, dizziness, vertigo, memory problems not related to HE. Psychology: Negative for anxiety, depression. FAMILY/SOCIAL HISTORY:  The patient is . There are 3 children. The patient smokes tobacco. The patient has been abstinent from alcohol since 2007. The patient is currently on disability. PHYSICAL EXAMINATION:    Visit Vitals  /71 (BP 1 Location: Left arm, BP Patient Position: Sitting)   Pulse 77   Temp 97.7 °F (36.5 °C)   Ht 5' 11\" (1.803 m)   Wt 223 lb (101.2 kg)   SpO2 92%   BMI 31.10 kg/m²       General: No acute distress. Eyes: Sclera anicteric. ENT: No oral lesions, thyroid normal.  Nodes: No adenopathy. Skin: Spider angiomata on chest, palmar erythema. Respiratory: Lungs clear to auscultation. Cardiovascular: Regular heart rate, no murmurs, no JVD. Abdomen: Soft non-tender, liver enlarged. Spleen not palpable. No obvious ascites. Umbilical hernia present. Extremities: Trace bilateral pedal edema. No muscle wasting. No gross arthritic changes. Neurologic: Alert and oriented, cranial nerves grossly intact, no asterixsis.     LABORATORY STUDIES:  [facility] [place] of [address] [place] [address] 9/25/2019 7/22/2019 5/1/2019   WBC 4.6 - 13.2 K/uL 5.8 6.4 3.3 (L)   ANC 1.8 - 8.0 K/UL 4.5 4.8 2.6   HGB 13.0 - 16.0 g/dL 13.6 13.1 12.7 (L)    - 420 K/uL 55 (L) 60 (L) 34 (L)   INR 0.8 - 1.2   1.4 (H) 1.4 (H) 1.6 (H)   AST 10 - 38 U/L 58 (H) 75 (H) 56 (H)   ALT 16 - 61 U/L 44 55 42   Alk Phos 45 - 117 U/L 135 (H) 151 (H) 110   Bili, Total 0.2 - 1.0 MG/DL 2.8 (H) 2.8 (H) 2.8 (H)   Bili, Direct 0.0 - 0.2 MG/DL 1.0 (H) 1.0 (H) 1.0 (H)   Albumin 3.4 - 5.0 g/dL 3.0 (L) 2.9 (L) 2.6 (L)   BUN 7.0 - 18 MG/DL 12 9 10   Creat 0.6 - 1.3 MG/DL 0.80 0.87 0.69   Na 136 - 145 mmol/L 139 138 140   K 3.5 - 5.5 mmol/L 4.4 3.6 4.1   Cl 100 - 111 mmol/L 103 105 109 (H)   CO2 21 - 32 mmol/L 31 27 27   Glucose 74 - 99 mg/dL 77 80 213 (H)   Ammonia 11 - 32 UMOL/L 102 (H) 33 (H) 89 (H)     Cancer Screening Latest Ref Rng & Units 9/25/2019 7/22/2019 5/1/2019   AFP, Serum 0.0 - 8.0 ng/mL 10.4 (H) 6.9 7.2   AFP-L3% 0.0 - 9.9 % 6.4 7.6 7.3     SEROLOGIES:  12/10:  HAV total positive, HB surface antigen negative, anti-HB surface negative, HCV genotype 1, HCV RNA log 5.5,    7/2011: Ferritin 73, iron saturation 57%. LIVER HISTOLOGY:  07/2018. TRANSIENT HEPATIC ELASTOGRAPHY:   E Range: 9.44-22.22 kPa  E Mean: 14.32 kPa  E Median: 14.68 kPa  E Std: 3.91 kPa    ENDOSCOPIC PROCEDURES:  9/2010:  Colonoscopy while hospitalized in [facility] - reported to demonstrate colonic polyps. 9/2010:  EGD while hospitalized in [facility] - reported to demonstrate severe gastritis from NSAIDs with small esophageal varices. 2/2011: EGD by [facility]. Esophageal varicies. Banding performed. 11/2011:  EGD by [facility]. Severe portal gastropathy. No varices. 4/2012:  EGD by [facility]:  Grade 2 esophageal varices. Banding performed. Severe hypertensive portal gastropathy. 8/2012:  EGD per [facility]. Small esophageal varices. Severe portal hypertensive gastropathy, gastritis of the antrum, no gastric varices. Repeat in 6 months. 10/2013. EGD by Dr Joellen Varela. Small varices with stigmata of bleeding. Portal gastropathy. 07/2014:  EGD per [facility]. 2 medium esophageal varices. Banding performed. Moderate portal hypertensive gastropathy. Repeat in one year. 9/1/2015: EGD per [facility]. Duodenal ulcer present. H. Pylori negative. Portal hypertensive gastropathy. Esophageal varices present. Four bands placed. Repeat in 6 months. 11/2015:  EGD per Dr. Romy Sims. Moderate portal gastropathy. Grade 1 esophageal varices. No gastric varices noted. 11/2015:  Colonoscopy per Dr. Romy Sims. Mild diverticulosis. 11/2016:  EGD per Dr. Sawyer Brewer. Small esophageal varices. Severe hypertensive portal gastropathy. Repeat in 6 months. 01/2018. EGD by Dr. Rupesh Londono. No report available. 10/2018. EGD by Dr. Lucy Cortes. Normal esophagus. Pathology negative. Repeat in 2 years. RADIOLOGY:  11/2010: Ultrasound of the liver. Echogenic consistent with cirrhosis. No liver mass lesions. No ascites. 2/2011: Ultrasound of the liver. Changes consistent with cirrhosis. No liver mass lesions. No ascites. No portal vein thrombosis. 11/2011: Ultrasound of the liver. Echogenic consistent with chronic liver disease, cirrhosis. No liver mass lesions. No ascites. 04/12:  CT of abdomen and pelvis - hepatic cirrhosis with portal hypertension with scattered upper abdominal collateralzation and varices. Likely recanalization of the umbilical artery. No mass noted. 1/2015:  Ultrasound of liver. Echogenic consistent with cirrhosis. No liver mass lesions. No dilated bile ducts. No ascites. 8/2015:  CT of the abdomen and pelvis. Coarsened, micronodular surface of the liver with widening of the interlobar fissure. No intrahepatic biliary ductal dilation. Stable enlargement of the portal vein with recanalization of the umbilical vein. Paraesophageal varices. Grossly stable splenomegaly  1/2016:  Ultrasound of liver. Echogenic consistent with cirrhosis. No liver mass lesions. No dilated bile ducts. No ascites. 11/2016:  Ultrasound of the liver. Echogenic consistent with cirrhosis. No liver mass lesions. No dilated bile ducts. No ascites. 05/2018. Abdominal ultrasound. Coarsened hepatic echogenicity consistent with hepatic parenchymal disease. No focal lesions or biliary dilatation demonstrated. 07/2018. Ultrasound of the liver. Cirrhotic hepatic morphology without focal hepatic lesion. 12/2018. Ultrasound of the liver. Stable right upper abdominal ultrasound, since 6-2018. Findings consistent with cirrhosis/chronic hepatocellular disease. Recanalized paraumbilical left hepatic vein. No perihepatic ascites. but without evidence of discrete focal mass  01/2019. Brain MRI. \"Probable recent acute/subacute infarct involving the anterior aspect of the left temporal stem. No evidence of associated hemorrhage or mass effect. \"  02/2019. Brain MRI. [facility]). Findings consistent with acute infarcts in the thalamus bilaterally as well as the left basal ganglia and the medial aspect of the left temporal lobe. Subacute infarct in the right basal ganglia which does not have restricted diffusion. 03/2019. Abdominal CT w contrast. ([address]). There is hepatic cirrhosis with diffuse nodular hepatic contour. Caudate lobe and left lobe liver are small.  No discrete hepatic mass. 09/2019. Ultrasound of the liver. Coarse and heterogeneous hepatic echotexture and nodular contour compatible with reported history of cirrhosis. No discrete hepatic mass identified. Dilated portal vein and recanalized umbilical veins consistent with portal hypertension. OTHER TESTING:  Not performed. ASSESSMENT AND PLAN:   Cirrhosis secondary to HCV. The HCV has been treated and cured. The most recent laboratory studies indicate that the AST is elevated, the ALT is normal, alkaline phosphatase is normal, tests of hepatic synthetic and metabolic function are depressed, and the platelet count is depressed. Will perform laboratory testing to monitor liver function and degree of liver injury. This will include hepatic panel, a CBC w/ diff, a BMP, a PT/INR, an ammonia level, and an AFP-L3%. Complications of cirrhosis were discussed in detail. We discussed thrombocytopenia, portal hypertension, varices, GI bleeding, peripheral edema, ascites, hepatic encephalopathy, and hepatocellular carcinoma. We discussed the need for follow ups on at regular intervals to monitor for complications. We discussed the need for every 6 month liver imaging studies. The patient has multiple ongoing medical issues    HCV treatment.   Mr. Olinda Pandya completed twenty-four weeks of treatment with Inder Paul with good toleration in 09/2016. He remained HCV RNA undetectable one year post treatment. CVA. Follow with neurology as directed. Upper GI bleed appears to have been due to portal gastropathy. Repeat EGD was performed in 10/2018. Normal esophagus. Some gastritis of the atrium. Pathology is negative. Repeat EGD in 10/2020. Hepatic encephalopathy. Continues to have intermittent HE. Now taking lactulose TID. He began Xifaxan last week. He is lucid today. I have ordered an ammonia level today. Lower extremity. The patient is currently on 40 mg lasix and 50 mg aldactone daily. Continue this dose. Edema well controlled. BUN/CRT are WNL. I have spoken to him and his daughter about the importance of salt restrictions. The patient was counseled regarding alcohol consumption. Vaccination for viral hepatitis B is recommended since the patient has no serologic evidence of previous exposure or vaccination with immunity. Vaccination for viral hepatitis A is not required. The patient has serologic evidence of prior exposure or vaccination with immunity. [facility] screening will be performed. AFP ordered today. Liver ultrasound ordered. Thrombocytopenia secondary to cirrhosis from chronic HCV. No treatment necessary. [address] in 6 months. All of the above issues were discussed with the patient. All questions were answered. The patient expressed a clear understanding of the above.     SHANNAN Walters  [facility] [place] of [address], [address] [place] [address]   [phone]

## 2020-01-29 LAB
AFP L3 MFR SERPL: 58.5 % (ref 0–9.9)
AFP SERPL-MCNC: 25.3 NG/ML (ref 0–8)

## 2020-03-16 ENCOUNTER — HOSPITAL ENCOUNTER (OUTPATIENT)
Dept: ULTRASOUND IMAGING | Age: 65
Discharge: HOME | End: 2020-03-16
Payer: MEDICARE

## 2020-03-16 DIAGNOSIS — K74.60 CIRRHOSIS OF LIVER WITHOUT ASCITES, UNSPECIFIED HEPATIC CIRRHOSIS TYPE (HCC): ICD-10-CM

## 2020-03-16 PROCEDURE — 76705 ECHO EXAM OF ABDOMEN: CPT

## 2020-04-01 ENCOUNTER — TELEPHONE (OUTPATIENT)
Dept: HEMATOLOGY | Age: 65
End: 2020-04-01

## 2020-04-01 DIAGNOSIS — K74.60 CIRRHOSIS OF LIVER WITHOUT ASCITES, UNSPECIFIED HEPATIC CIRRHOSIS TYPE (HCC): Primary | ICD-10-CM

## 2020-04-01 NOTE — TELEPHONE ENCOUNTER
Left a message on care giver's answering machine explaining that I have ordered a dynamic MRI to better characterize a hepatic mass seen on recent ultrasound. There was no answer. Asked for a return phone call at [phone].

## 2020-05-29 ENCOUNTER — HOSPITAL ENCOUNTER (OUTPATIENT)
Dept: MRI IMAGING | Age: 65
Discharge: HOME | End: 2020-05-29
Payer: MEDICARE

## 2020-05-29 DIAGNOSIS — K74.60 CIRRHOSIS OF LIVER WITHOUT ASCITES, UNSPECIFIED HEPATIC CIRRHOSIS TYPE (HCC): ICD-10-CM

## 2020-05-29 PROCEDURE — 74183 MRI ABD W/O CNTR FLWD CNTR: CPT

## 2020-05-29 PROCEDURE — A9575 INJ GADOTERATE MEGLUMI 0.1ML: HCPCS | Performed by: NURSE PRACTITIONER

## 2020-05-29 PROCEDURE — 74011636320 HC RX REV CODE- 636/320: Performed by: NURSE PRACTITIONER

## 2020-05-29 RX ADMIN — GADOTERATE MEGLUMINE 20 ML: 376.9 INJECTION INTRAVENOUS at 11:10

## 2020-06-08 RX ORDER — PANTOPRAZOLE SODIUM 40 MG/1
TABLET, DELAYED RELEASE ORAL
Qty: 90 TAB | Refills: 2 | Status: SHIPPED | OUTPATIENT
Start: 2020-06-08

## 2020-06-15 RX ORDER — PANTOPRAZOLE SODIUM 40 MG/1
40 TABLET, DELAYED RELEASE ORAL DAILY
Qty: 90 TAB | Refills: 3 | Status: SHIPPED | OUTPATIENT
Start: 2020-06-15 | End: 2020-07-14 | Stop reason: SDUPTHER

## 2020-06-22 RX ORDER — FUROSEMIDE 40 MG/1
TABLET ORAL
Qty: 180 TAB | Refills: 3 | Status: SHIPPED | OUTPATIENT
Start: 2020-06-22 | End: 2020-07-14 | Stop reason: SDUPTHER

## 2020-06-22 RX ORDER — AMILORIDE HYDROCHLORIDE 5 MG/1
TABLET ORAL
Qty: 180 TAB | Refills: 3 | Status: SHIPPED | OUTPATIENT
Start: 2020-06-22 | End: 2020-07-14 | Stop reason: ALTCHOICE

## 2020-07-01 ENCOUNTER — HOSPITAL ENCOUNTER (OUTPATIENT)
Dept: CT IMAGING | Age: 65
Discharge: HOME | End: 2020-07-01
Attending: RADIOLOGY
Payer: MEDICARE

## 2020-07-01 DIAGNOSIS — C22.0 HEPATOCELLULAR CARCINOMA (HCC): ICD-10-CM

## 2020-07-01 LAB — CREAT UR-MCNC: 0.8 MG/DL (ref 0.6–1.3)

## 2020-07-01 PROCEDURE — 82565 ASSAY OF CREATININE: CPT

## 2020-07-01 PROCEDURE — 74170 CT ABD WO CNTRST FLWD CNTRST: CPT

## 2020-07-01 PROCEDURE — 74011636320 HC RX REV CODE- 636/320: Performed by: RADIOLOGY

## 2020-07-01 RX ADMIN — IOPAMIDOL 100 ML: 755 INJECTION, SOLUTION INTRAVENOUS at 09:23

## 2020-07-09 ENCOUNTER — HOSPITAL ENCOUNTER (EMERGENCY)
Age: 65
Discharge: HOME | End: 2020-07-09
Attending: EMERGENCY MEDICINE
Payer: MEDICARE

## 2020-07-09 ENCOUNTER — APPOINTMENT (OUTPATIENT)
Dept: GENERAL RADIOLOGY | Age: 65
End: 2020-07-09
Attending: PHYSICIAN ASSISTANT
Payer: MEDICARE

## 2020-07-09 VITALS
DIASTOLIC BLOOD PRESSURE: 67 MMHG | OXYGEN SATURATION: 98 % | BODY MASS INDEX: 31.5 KG/M2 | WEIGHT: 225 LBS | HEIGHT: 71 IN | SYSTOLIC BLOOD PRESSURE: 112 MMHG | RESPIRATION RATE: 13 BRPM | HEART RATE: 73 BPM | TEMPERATURE: 98.2 F

## 2020-07-09 DIAGNOSIS — R60.0 EDEMA OF BOTH LEGS: ICD-10-CM

## 2020-07-09 DIAGNOSIS — R18.8 ASCITES OF LIVER: Primary | ICD-10-CM

## 2020-07-09 DIAGNOSIS — C22.0 HEPATOCELLULAR CARCINOMA (HCC): ICD-10-CM

## 2020-07-09 LAB
ALBUMIN SERPL-MCNC: 2.5 G/DL (ref 3.4–5)
ALBUMIN/GLOB SERPL: 0.6 {RATIO} (ref 0.8–1.7)
ALP SERPL-CCNC: 214 U/L (ref 45–117)
ALT SERPL-CCNC: 55 U/L (ref 16–61)
AMMONIA PLAS-SCNC: 43 UMOL/L (ref 11–32)
ANION GAP SERPL CALC-SCNC: 3 MMOL/L (ref 3–18)
APTT PPP: 41.4 SEC (ref 23–36.4)
AST SERPL-CCNC: 110 U/L (ref 10–38)
ATRIAL RATE: 76 BPM
BASOPHILS # BLD: 0 K/UL (ref 0–0.1)
BASOPHILS NFR BLD: 0 % (ref 0–2)
BILIRUB SERPL-MCNC: 4.9 MG/DL (ref 0.2–1)
BNP SERPL-MCNC: 72 PG/ML (ref 0–900)
BUN SERPL-MCNC: 12 MG/DL (ref 7–18)
BUN/CREAT SERPL: 16 (ref 12–20)
CALCIUM SERPL-MCNC: 7.9 MG/DL (ref 8.5–10.1)
CALCULATED P AXIS, ECG09: 56 DEGREES
CALCULATED R AXIS, ECG10: -15 DEGREES
CALCULATED T AXIS, ECG11: 48 DEGREES
CHLORIDE SERPL-SCNC: 105 MMOL/L (ref 100–111)
CK MB CFR SERPL CALC: 2.8 % (ref 0–4)
CK MB SERPL-MCNC: 3.7 NG/ML (ref 5–25)
CK SERPL-CCNC: 132 U/L (ref 39–308)
CO2 SERPL-SCNC: 29 MMOL/L (ref 21–32)
CREAT SERPL-MCNC: 0.77 MG/DL (ref 0.6–1.3)
DIAGNOSIS, 93000: NORMAL
DIFFERENTIAL METHOD BLD: ABNORMAL
EOSINOPHIL # BLD: 0.2 K/UL (ref 0–0.4)
EOSINOPHIL NFR BLD: 3 % (ref 0–5)
ERYTHROCYTE [DISTWIDTH] IN BLOOD BY AUTOMATED COUNT: 15.1 % (ref 11.6–14.5)
GLOBULIN SER CALC-MCNC: 4.2 G/DL (ref 2–4)
GLUCOSE SERPL-MCNC: 92 MG/DL (ref 74–99)
HCT VFR BLD AUTO: 39.2 % (ref 36–48)
HGB BLD-MCNC: 13.4 G/DL (ref 13–16)
INR PPP: 1.7 (ref 0.8–1.2)
LYMPHOCYTES # BLD: 0.3 K/UL (ref 0.9–3.6)
LYMPHOCYTES NFR BLD: 6 % (ref 21–52)
MCH RBC QN AUTO: 34.7 PG (ref 24–34)
MCHC RBC AUTO-ENTMCNC: 34.2 G/DL (ref 31–37)
MCV RBC AUTO: 101.6 FL (ref 74–97)
MONOCYTES # BLD: 0.5 K/UL (ref 0.05–1.2)
MONOCYTES NFR BLD: 10 % (ref 3–10)
NEUTS SEG # BLD: 4.2 K/UL (ref 1.8–8)
NEUTS SEG NFR BLD: 81 % (ref 40–73)
P-R INTERVAL, ECG05: 180 MS
PLATELET # BLD AUTO: 42 K/UL (ref 135–420)
PMV BLD AUTO: 10.2 FL (ref 9.2–11.8)
POTASSIUM SERPL-SCNC: 4 MMOL/L (ref 3.5–5.5)
PROT SERPL-MCNC: 6.7 G/DL (ref 6.4–8.2)
PROTHROMBIN TIME: 19.6 SEC (ref 11.5–15.2)
Q-T INTERVAL, ECG07: 428 MS
QRS DURATION, ECG06: 84 MS
QTC CALCULATION (BEZET), ECG08: 481 MS
RBC # BLD AUTO: 3.86 M/UL (ref 4.7–5.5)
SODIUM SERPL-SCNC: 137 MMOL/L (ref 136–145)
TROPONIN I SERPL-MCNC: <0.02 NG/ML (ref 0–0.04)
VENTRICULAR RATE, ECG03: 76 BPM
WBC # BLD AUTO: 5.2 K/UL (ref 4.6–13.2)

## 2020-07-09 PROCEDURE — 83880 ASSAY OF NATRIURETIC PEPTIDE: CPT

## 2020-07-09 PROCEDURE — 85610 PROTHROMBIN TIME: CPT

## 2020-07-09 PROCEDURE — 71045 X-RAY EXAM CHEST 1 VIEW: CPT

## 2020-07-09 PROCEDURE — 93005 ELECTROCARDIOGRAM TRACING: CPT

## 2020-07-09 PROCEDURE — 82550 ASSAY OF CK (CPK): CPT

## 2020-07-09 PROCEDURE — 99285 EMERGENCY DEPT VISIT HI MDM: CPT

## 2020-07-09 PROCEDURE — 85730 THROMBOPLASTIN TIME PARTIAL: CPT

## 2020-07-09 PROCEDURE — 85025 COMPLETE CBC W/AUTO DIFF WBC: CPT

## 2020-07-09 PROCEDURE — 96374 THER/PROPH/DIAG INJ IV PUSH: CPT

## 2020-07-09 PROCEDURE — 82140 ASSAY OF AMMONIA: CPT

## 2020-07-09 PROCEDURE — 80053 COMPREHEN METABOLIC PANEL: CPT

## 2020-07-09 PROCEDURE — 74011250636 HC RX REV CODE- 250/636: Performed by: PHYSICIAN ASSISTANT

## 2020-07-09 RX ORDER — FUROSEMIDE 10 MG/ML
40 INJECTION INTRAMUSCULAR; INTRAVENOUS
Status: COMPLETED | OUTPATIENT
Start: 2020-07-09 | End: 2020-07-09

## 2020-07-09 RX ADMIN — FUROSEMIDE 40 MG: 10 INJECTION, SOLUTION INTRAMUSCULAR; INTRAVENOUS at 20:50

## 2020-07-09 NOTE — ED TRIAGE NOTES
Per daughter patient with fluid retention.  Patient recently diagnosed with a liver tumor and states his fluid pills are not working

## 2020-07-09 NOTE — ED PROVIDER NOTES
EMERGENCY DEPARTMENT HISTORY AND PHYSICAL EXAM    Date: 7/9/2020  Patient Name: Jazmin Pavon    History of Presenting Illness     Chief Complaint   Patient presents with    Other         History Provided By: Patient and Patient's Daughter    5:52 PM  Jazmin Pavon is a 62 y.o. male with PMHX of cirrhosis secondary to chronic hepatitis C, hyperlipidemia, stroke, esophageal varices who presents to the emergency department C/O worsening BLE and abd swelling x 2 weeks. Patient currently on 80 mg of Lasix, Spironolactone was discontinued and changed to 10 mg of amiloride 2 weeks ago. Since then, daughter states he has been having worsening abdominal swelling, bilateral leg swelling (right worse than left which is chronic due to history of lymphedema in that leg), cough and mild shortness of breath. Patient was diagnosed with a new liver nodule March 2020, due to COVID-19, work-up was somewhat delayed. Had MRI of the abdomen on 5/29/2020 and CT abd/palvis on 7/1/20 and awaiting referral to interventional radiologist at  [facility] for consult. Pt and daughter deny confusion, change in mental status, chest pain, fever, COVID-19 exposure, abdominal pain, and any other sxs or complaints. PCP: Debbie Abad MD    Current Outpatient Medications   Medication Sig Dispense Refill    furosemide (LASIX) 40 mg tablet Take two tabs daily by mouth. 180 Tab 3    aMILoride (MIDAMOR) 5 mg tablet Take 2 tabs daily by mouth. 180 Tab 3    pantoprazole (PROTONIX) 40 mg tablet Take 1 Tab by mouth daily. 90 Tab 3    pantoprazole (PROTONIX) 40 mg tablet TAKE 1 TABLET DAILY 90 Tab 2    furosemide (LASIX) 40 mg tablet Take 1 Tab by mouth daily. 90 Tab 3    acetaminophen (TYLENOL) 325 mg tablet Take 325 mg by mouth every six (6) hours as needed for Fever or Pain.  aspirin delayed-release 81 mg tablet Take 325 mg by mouth daily.       cyanocobalamin (VITAMIN B12) 500 mcg tablet Take 500 mcg by mouth daily.      rifAXIMin (XIFAXAN) 550 mg tablet Take 1 Tab by mouth two (2) times a day. 180 Tab 3    atorvastatin (LIPITOR) 10 mg tablet Take 1 Tab by mouth daily. 30 Tab 0    lactulose (CHRONULAC) 10 gram/15 mL solution Take 20 g by mouth three (3) times daily. Indications: Hepatic Encephalopathy         Past History     Past Medical History:  Past Medical History:   Diagnosis Date    Cancer ([facility] 11/2011    basal cell carcinoma/L cheek    Cirrhosis ([facility]     Secondary to chronic HCV    Contact dermatitis and other eczema, due to unspecified cause     Esophageal varices in cirrhosis (HCC)     GERD (gastroesophageal reflux disease)     H/O in the past    Hep C w/ coma, chronic ([facility]     cured from Hep C     Hepatitis C     Cured in 2-2016    Ill-defined condition     Liver disease     Other ill-defined conditions(799.89)     hands and bottom of feet going numb lately    Skin cancer     BCC left cheek 2011    Stroke ([facility] 02/15/2019    Stroke ([facility]     [facility]16[facility]    Upper GI bleed     Secondary to NSAID induced gastritis, 09/10. Past Surgical History:  Past Surgical History:   Procedure Laterality Date    EGD  09/10    EGD while hospitalized in [place]-reported to demonstrate severe gastritis from [facility] with small esophageal varices.     ENDOSCOPY, COLON, DIAGNOSTIC  09/10    Colonoscopy while in hospitalized in [facility]- reported to demonstrate colonic polyps    HX CATARACT REMOVAL  2018    HX HERNIA REPAIR      umbilical    HX LUMBAR LAMINECTOMY  1992    HX MALIGNANT SKIN LESION EXCISION  11/1/2011    standard excision to L cheek d/t basal cell carcinoma    HX TONSILLECTOMY         Family History:  Family History   Problem Relation Age of Onset    Migraines Brother     Malignant Hyperthermia Neg Hx     Pseudocholinesterase Deficiency Neg Hx     Delayed Awakening Neg Hx     Post-op Nausea/Vomiting Neg Hx     Emergence Delirium Neg Hx     Post-op Cognitive Dysfunction Neg Hx     Other Neg Hx        Social History:  Social History     Tobacco Use    Smoking status: Former Smoker     Last attempt to quit: 2019     Years since quittin.5    Smokeless tobacco: Never Used   Substance Use Topics    Alcohol use: No     Alcohol/week: 0.0 standard drinks    Drug use: No       Allergies: Allergies   Allergen Reactions    Aspirin Other (comments)     Bleeding      Valium [Diazepam] Swelling         Review of Systems   Review of Systems   Constitutional: Negative for fever. Respiratory: Positive for cough and shortness of breath. Cardiovascular: Negative for chest pain. Gastrointestinal: Negative for abdominal pain. Psychiatric/Behavioral: Negative for confusion. All other systems reviewed and are negative. Physical Exam     Vitals:    20 1915 20 1930 20 1945 20 2106   BP: 110/67 111/69 114/69 112/67   Pulse: 80 73 73    Resp: 13 13 13    Temp:       SpO2: 97% 97% 98%    Weight:       Height:         Physical Exam    Vital signs and nursing notes reviewed. CONSTITUTIONAL: Alert. Well-appearing; well-nourished; in no apparent distress. HEAD: Normocephalic; atraumatic. EYES: Conjunctiva clear. ENT: Moist mucus membranes. NECK: Supple; FROM without difficulty, non-tender; no cervical lymphadenopathy. CV: Normal S1, S2; no murmurs, rubs, or gallops. No chest wall tenderness. RESPIRATORY: Normal chest excursion with respiration; scattered coarse wheezes, worst at the bases. GI: Normal bowel sounds; +distended; non-tender; No CVA tenderness. SKIN: Normal for age and race; warm; dry; good turgor; no apparent lesions or exudate. NEURO: A & O x3. PSYCH:  Mood and affect appropriate.          Diagnostic Study Results     Labs -     Recent Results (from the past 12 hour(s))   EKG, 12 LEAD, INITIAL    Collection Time: 20  6:52 PM   Result Value Ref Range    Ventricular Rate 76 BPM    Atrial Rate 76 BPM    P-R Interval 180 ms    QRS Duration 84 ms    Q-T Interval 428 ms    QTC Calculation (Bezet) 481 ms    Calculated P Axis 56 degrees    Calculated R Axis -15 degrees    Calculated T Axis 48 degrees    Diagnosis       Normal sinus rhythm  Prolonged QT  Abnormal ECG  When compared with ECG of 30-JAN-2019 09:31,  Nonspecific T wave abnormality no longer evident in Inferior leads     CBC WITH AUTOMATED DIFF    Collection Time: 07/09/20  6:55 PM   Result Value Ref Range    WBC 5.2 4.6 - 13.2 K/uL    RBC 3.86 (L) 4.70 - 5.50 M/uL    HGB 13.4 13.0 - 16.0 g/dL    HCT 39.2 36.0 - 48.0 %    .6 (H) 74.0 - 97.0 FL    MCH 34.7 (H) 24.0 - 34.0 PG    MCHC 34.2 31.0 - 37.0 g/dL    RDW 15.1 (H) 11.6 - 14.5 %    PLATELET 42 (L) 426 - 420 K/uL    MPV 10.2 9.2 - 11.8 FL    NEUTROPHILS 81 (H) 40 - 73 %    LYMPHOCYTES 6 (L) 21 - 52 %    MONOCYTES 10 3 - 10 %    EOSINOPHILS 3 0 - 5 %    BASOPHILS 0 0 - 2 %    ABS. NEUTROPHILS 4.2 1.8 - 8.0 K/UL    ABS. LYMPHOCYTES 0.3 (L) 0.9 - 3.6 K/UL    ABS. MONOCYTES 0.5 0.05 - 1.2 K/UL    ABS. EOSINOPHILS 0.2 0.0 - 0.4 K/UL    ABS. BASOPHILS 0.0 0.0 - 0.1 K/UL    DF AUTOMATED     METABOLIC PANEL, COMPREHENSIVE    Collection Time: 07/09/20  6:55 PM   Result Value Ref Range    Sodium 137 136 - 145 mmol/L    Potassium 4.0 3.5 - 5.5 mmol/L    Chloride 105 100 - 111 mmol/L    CO2 29 21 - 32 mmol/L    Anion gap 3 3.0 - 18 mmol/L    Glucose 92 74 - 99 mg/dL    BUN 12 7.0 - 18 MG/DL    Creatinine 0.77 0.6 - 1.3 MG/DL    BUN/Creatinine ratio 16 12 - 20      GFR est AA >60 >60 ml/min/1.73m2    GFR est non-AA >60 >60 ml/min/1.73m2    Calcium 7.9 (L) 8.5 - 10.1 MG/DL    Bilirubin, total 4.9 (H) 0.2 - 1.0 MG/DL    ALT (SGPT) 55 16 - 61 U/L    AST (SGOT) 110 (H) 10 - 38 U/L    Alk.  phosphatase 214 (H) 45 - 117 U/L    Protein, total 6.7 6.4 - 8.2 g/dL    Albumin 2.5 (L) 3.4 - 5.0 g/dL    Globulin 4.2 (H) 2.0 - 4.0 g/dL    A-G Ratio 0.6 (L) 0.8 - 1.7     CARDIAC PANEL,(CK, CKMB & TROPONIN)    Collection Time: 07/09/20  6:55 PM   Result Value Ref Range    CK - MB 3.7 (H) <3.6 ng/ml    CK-MB Index 2.8 0.0 - 4.0 %     39 - 308 U/L    Troponin-I, QT <0.02 0.0 - 0.045 NG/ML   NT-PRO BNP    Collection Time: 07/09/20  6:55 PM   Result Value Ref Range    NT pro-BNP 72 0 - 900 PG/ML   AMMONIA    Collection Time: 07/09/20  6:55 PM   Result Value Ref Range    Ammonia 43 (H) 11 - 32 UMOL/L   PTT    Collection Time: 07/09/20  6:55 PM   Result Value Ref Range    aPTT 41.4 (H) 23.0 - 36.4 SEC   PROTHROMBIN TIME + INR    Collection Time: 07/09/20  6:55 PM   Result Value Ref Range    Prothrombin time 19.6 (H) 11.5 - 15.2 sec    INR 1.7 (H) 0.8 - 1.2         Radiologic Studies -   XR CHEST PORT   Final Result   Addendum 1 of 1   Addendum: Indication: Retaining fluid      Final        CT Results  (Last 48 hours)    None        CXR Results  (Last 48 hours)    None          Medications given in the ED-  Medications   furosemide (LASIX) injection 40 mg (40 mg IntraVENous Given 7/9/20 2050)         Medical Decision Making   I am the first provider for this patient. I reviewed the vital signs, available nursing notes, past medical history, past surgical history, family history and social history. Vital Signs-Reviewed the patient's vital signs. Pulse Oximetry Analysis - 100% on RA       EKG interpretation: (Preliminary)  Normal sinus rhythm, rate 76, no acute changes or STEMI    Records Reviewed: Nursing Notes, Old Medical Records and Previous Radiology Studies    MRI abd 5/29/20  IMPRESSION:     1. Hepatic findings:  -8.6 cm arterial enhancing mass with heterogeneous areas of washout is present  in the right lateral liver which is causing bulging of the capsule. This is  associated with tumor thrombus which extends into the right portal vein. LR-TIV  -Large recannulized paraumbilical vein is present. Cirrhosis is present.     2.  Extrahepatic findings:  -Splenomegaly.  -Large caliber splenic vein with likely splenorenal shunt  -Moderate esophageal varices. 7/1/20 CT abd/pelvis  IMPRESSION:     1. Infiltrating hepatoma segment 5 and 8 with expansile large tumor thrombus  extending into a main portal vein. Additional satellite lesions in left hepatic  lobe. Intralesional shunting. LIRADS - 5V. Biopsy is not recommended.     2.   Grade 3 distal esophagus GE junction varices off the coronary vein. No  splenorenal or gastrorenal shunts. Follow-up with upper endoscopy is  recommended.     3. Almost complete occlusion of the right hepatic lobe portal vein branches with  significant portal vein enlargement, patent splenic vein and systemic  decompression via large parafalciform/paraumbilical varix.     4. Patent but enlarged left portal vein branch.     5. Small intra-abdominal ascites.     6. Cirrhosis with sequela of significant portal hypertension and associated  mesenteric congestion.     7. Cholelithiasis without evidence of cholecystitis.     8. Gynecomastia. Procedures:  Procedures    ED Course:  5:52 PM   Initial assessment performed. The patients presenting problems have been discussed, and they are in agreement with the care plan formulated and outlined with them. I have encouraged them to ask questions as they arise throughout their visit. 8:45 pM consult note  Case, labs, imaging and medications discussed with patient's hepatologist Dr. Bry Saba. We also reviewed patient's recent MRI and CT of the abdomen. He states that patient will be following up with interventional radiologist as he may be a candidate for liver tumor burden palliative treatment, not a candidate for anticoagulation for portal vein thrombus. Given his ascites/edema in setting of recent changes in his diuretic, he recommends increasing his Lasix to 120 mg once a day and his amiloride to 15 mg once daily. Patient has already taken 80 mg of Lasix earlier today so he agrees with adding 40 mg tonight before discharge home.   Recommends close follow-up with NP Kodi Toledo with his office. Daughter at bedside agrees with plan, turn precautions discussed. Diagnosis and Disposition       DISCHARGE NOTE:    Jose Benson's  results have been reviewed with him. He has been counseled regarding his diagnosis, treatment, and plan. He verbally conveys understanding and agreement of the signs, symptoms, diagnosis, treatment and prognosis and additionally agrees to follow up as discussed. He also agrees with the care-plan and conveys that all of his questions have been answered. I have also provided discharge instructions for him that include: educational information regarding their diagnosis and treatment, and list of reasons why they would want to return to the ED prior to their follow-up appointment, should his condition change. He has been provided with education for proper emergency department utilization. CLINICAL IMPRESSION:    1. Ascites of liver    2. Edema of both legs    3. Hepatocellular carcinoma ([facility]        PLAN:  1. D/C Home  2. Discharge Medication List as of 7/9/2020  8:41 PM        3. Follow-up Information     Follow up With Specialties Details Why Contact Info    Florin Richards MD Hepatology, Liver Disease, Internal Medicine Schedule an appointment as soon as possible for a visit  [address] [place] [address] [place]  [address]  [phone]      [facility] EMERGENCY DEPT Emergency Medicine  As needed, If symptoms worsen [address] [id number]  [phone]        _______________________________      Please note that this dictation was completed with [employer], the computer voice recognition software. Quite often unanticipated grammatical, syntax, homophones, and other interpretive errors are inadvertently transcribed by the computer software. Please disregard these errors. Please excuse any errors that have escaped final proofreading.

## 2020-07-10 NOTE — DISCHARGE INSTRUCTIONS
Increase Lasix to 120mg daily (three 40mg tabs) and Amiloride to 15mg daily (three 5mg tabs). Patient Education        Ascites: Care Instructions  Your Care Instructions  Ascites (say \"romana-Shannon\") is a buildup of extra fluid in the belly. It can cause your belly to swell. It can also make it hard for you to breathe. Many diseases can cause ascites. But most people who get it have a liver problem. When the liver gets damaged, it can cause fluid to back up from the liver or from blood vessels. Then this fluid builds up in the belly. Your doctor may take a sample of the fluid in your belly with a thin needle. The sample is then tested to help find out the cause of the ascites. Treatment may include medicine. It may also include changing the way you eat so you don't eat a lot of salt. If your ascites is very bad and hard to treat, your doctor may need to use a needle to take out the fluid. Follow-up care is a key part of your treatment and safety. Be sure to make and go to all appointments, and call your doctor if you are having problems. It's also a good idea to know your test results and keep a list of the medicines you take. How can you care for yourself at home? · Be safe with medicines. Take your medicines exactly as prescribed. Call your doctor if you have any problems with your medicine. You will get more details on the specific medicines your doctor prescribes. · Eat low-salt foods, and don't add salt to your food. If you eat a lot of salt, it's harder to get rid of the extra fluid. Salt is in many prepared foods. These include castro, canned foods, snack foods, sauces, and soups. Look for products that are low-sodium or have reduced salt. · Do not drink any alcohol until you are all better. Alcohol will damage the liver more. If your liver disease is caused by drinking alcohol, do not drink alcohol at all. Tell your doctor if you need help to quit.  Counseling, support groups, and sometimes medicines can help you stay sober. · Talk to your doctor before you take any other medicines. These include over-the-counter medicines, vitamins, and herbal products. · Make sure your doctor knows all the medicines you take. Some medicines, such as acetaminophen (Tylenol), can make liver problems worse. When should you call for help? [id number] anytime you think you may need emergency care. For example, call if:  · You have trouble breathing. · You vomit blood or what looks like coffee grounds. Call your doctor now or seek immediate medical care if:  · You have new or worse belly pain. · You have a fever. Watch closely for changes in your health, and be sure to contact your doctor if:  · You have any problems. · Your belly is getting bigger. · You are gaining weight. Where can you learn more? Go to http://www.gray.com/  Enter B970 in the search box to learn more about \"Ascites: Care Instructions. \"  Current as of: August 12, 2019               Content Version: 12.5  © 7014-1545 [employer]. Care instructions adapted under license by "[employer] (which disclaims liability or warranty for this information). If you have questions about a medical condition or this instruction, always ask your healthcare professional. [address] any warranty or liability for your use of this information. Patient Education        Leg and Ankle Edema: Care Instructions  Your Care Instructions  Swelling in the legs, ankles, and feet is called edema. It is common after you sit or stand for a while. Long plane flights or car rides often cause swelling in the legs and feet. You may also have swelling if you have to stand for long periods of time at your job. Problems with the veins in the legs (varicose veins) and changes in hormones can also cause swelling.  Sometimes the swelling in the ankles and feet is caused by a more serious problem, such as heart failure, infection, blood clots, or liver or kidney disease. Follow-up care is a key part of your treatment and safety. Be sure to make and go to all appointments, and call your doctor if you are having problems. It's also a good idea to know your test results and keep a list of the medicines you take. How can you care for yourself at home? · If your doctor gave you medicine, take it as prescribed. Call your doctor if you think you are having a problem with your medicine. · Whenever you are resting, raise your legs up. Try to keep the swollen area higher than the level of your heart. · Take breaks from standing or sitting in one position. ? Walk around to increase the blood flow in your lower legs. ? Move your feet and ankles often while you stand, or tighten and relax your leg muscles. · Wear support stockings. Put them on in the morning, before swelling gets worse. · Eat a balanced diet. Lose weight if you need to. · Limit the amount of salt (sodium) in your diet. Salt holds fluid in the body and may increase swelling. When should you call for help? [id number] anytime you think you may need emergency care. For example, call if:  · You have symptoms of a blood clot in your lung (called a pulmonary embolism). These may include:  ? Sudden chest pain. ? Trouble breathing. ? Coughing up blood. Call your doctor now or seek immediate medical care if:  · You have signs of a blood clot, such as:  ? Pain in your calf, back of the knee, thigh, or groin. ? Redness and swelling in your leg or groin. · You have symptoms of infection, such as:  ? Increased pain, swelling, warmth, or redness. ? Red streaks or pus. ? A fever. Watch closely for changes in your health, and be sure to contact your doctor if:  · Your swelling is getting worse. · You have new or worsening pain in your legs. · You do not get better as expected. Where can you learn more?   Go to http://www.gray.com/  Enter X087 in the search box to learn more about \"Leg and Ankle Edema: Care Instructions. \"  Current as of: June 26, 2019               Content Version: 12.5  © 4204-4182 [employer]. Care instructions adapted under license by [employer] (which disclaims liability or warranty for this information). If you have questions about a medical condition or this instruction, always ask your healthcare professional. [place] [address] any warranty or liability for your use of this information.

## 2020-07-10 NOTE — ED NOTES
Assumed care of pt;  Pt lying on stretcher in NAD;  Pt sleepy but arousalable with voice;  Daughter at bedside; call bell within reach; update of care verbalized to daughter and pt;  Vss;  SR up x's 2;  Bedside CM showing SR;

## 2020-07-10 NOTE — ED NOTES
Pt discharged home stable and w/c to vehicle without incident. Pain level at discharge 0/10. Pt discharged with daughter. Reviewed discharged instructions with pt who verbalized understanding.   Patient armband removed and shredded

## 2020-07-14 ENCOUNTER — VIRTUAL VISIT (OUTPATIENT)
Dept: HEMATOLOGY | Age: 65
End: 2020-07-14

## 2020-07-14 DIAGNOSIS — K74.60 CIRRHOSIS OF LIVER WITHOUT ASCITES, UNSPECIFIED HEPATIC CIRRHOSIS TYPE (HCC): Primary | ICD-10-CM

## 2020-07-14 RX ORDER — SPIRONOLACTONE 100 MG/1
300 TABLET, FILM COATED ORAL DAILY
Qty: 270 TAB | Refills: 3 | Status: SHIPPED | OUTPATIENT
Start: 2020-07-14

## 2020-07-14 RX ORDER — ESCITALOPRAM OXALATE 10 MG/1
20 TABLET ORAL DAILY
COMMUNITY
Start: 2019-12-26

## 2020-07-14 RX ORDER — SPIRONOLACTONE 100 MG/1
100 TABLET, FILM COATED ORAL DAILY
COMMUNITY
Start: 2019-05-31 | End: 2020-07-14 | Stop reason: DRUGHIGH

## 2020-07-14 RX ORDER — PANTOPRAZOLE SODIUM 40 MG/1
40 TABLET, DELAYED RELEASE ORAL DAILY
Status: ON HOLD | COMMUNITY
Start: 2019-03-07 | End: 2020-07-27 | Stop reason: SDUPTHER

## 2020-07-14 RX ORDER — GLUCOSAMINE SULFATE 1500 MG
POWDER IN PACKET (EA) ORAL DAILY
COMMUNITY
End: 2020-08-19

## 2020-07-14 RX ORDER — HYDROCODONE BITARTRATE AND ACETAMINOPHEN 5; 325 MG/1; MG/1
1 TABLET ORAL
Qty: 30 TAB | Refills: 0 | Status: SHIPPED | OUTPATIENT
Start: 2020-07-14 | End: 2020-07-17

## 2020-07-14 NOTE — PROGRESS NOTES
VIRTUAL TELEHEALTH VISIT PERFORMED DUE TO COVID-19 EPIDEMIC    CONSENT:  Justin Ramirez, who was seen by synchronous, real-time, audio-video technology, and/or his healthcare decision maker, is aware that this patient-initiated, Telehealth encounter on 7/14/2020 is a billable service, with coverage as determined by his insurance carrier. He is aware that he may receive a bill and has provided verbal consent to proceed. This patient was evaluated during a Virtual Telehealth visit. A caregiver was present if appropriate. Due to this being a TeleHealth encounter performed during the [place] [address] public health emergency, the physical examination was limited to that listed in the [address] [place] [address], Buzz MANZANO, Nuha Mercado MD Jadene Smock, DAISY Cobian, ACNP-BC     April S Jalyn, AGNP-BC   Thu Slaughter, y [address], [facility]       [facility] 136    at [address]    [address] [place] [address], [address] [place] [address] 22.    [phone]    FAX: [address]    at [address], [address] [facility], [address]    [phone]    FAX: [phone]       Patient Care Team:  Christiano Troy MD as PCP - General (Internal Medicine)  Thalia Melgar MD (Neurology)  Ling Nye MD (Psychiatry)      Problem List  Date Reviewed: 1/28/2020          Codes Class Noted    Hepatitis C virus infection cured after antiviral drug therapy ICD-10-CM: Z86.19  ICD-9-CM: V12.09  1/28/2020    Overview Signed 1/28/2020 10:39 AM by Nimo Rincon NP     Cured with 24 weeks of Gagandeep Montalvo 09/2016.                Hepatic encephalopathy [facility]) ICD-10-CM: K72.90  ICD-9-CM: 572.2  2/1/2019        CVA (cerebral vascular accident) [facility]) ICD-10-CM: I63.9  ICD-9-CM: 434.91  1/30/2019        Elevated INR ICD-10-CM: R79.1  ICD-9-CM: 790.92  1/30/2019        Back pain ICD-10-CM: M54.9  ICD-9-CM: 724.5  1/30/2019        Chest pressure ICD-10-CM: R07.89  ICD-9-CM: 786.59  1/30/2019        Cellulitis ICD-10-CM: L03.90  ICD-9-CM: 682.9  7/24/2018        Portal hypertensive gastropathy ([facility] ICD-10-CM: K76.6, K31.89  ICD-9-CM: 572.3, 537.89  12/22/2013        Cirrhosis ([facility] ICD-10-CM: K74.60  ICD-9-CM: 571.5  9/5/2011        Upper GI bleed secondary to NSAID induced gastritis - 9/2010 ICD-10-CM: K92.2  ICD-9-CM: 578.9  9/5/2011        S/P lumbar laminectomy with spinal fusion - 1992 ICD-10-CM: Z98.1  ICD-9-CM: V45.4  9/5/2011        Colon polyps ICD-10-CM: K63.5  ICD-9-CM: 211.3  9/5/2011    Overview Signed 9/5/2011 10:38 AM by Florin Richards MD     Last colonoscopy 9/2010             Esophageal varices ([facility] ICD-10-CM: I85.00  ICD-9-CM: 456.1  9/5/2011        Thrombocytopenia secondary to cirrhosis ICD-10-CM: D69.6  ICD-9-CM: 287.5  9/5/2011              Estrella Mahajan was seen today via virtual visit at the [place] [address] of [facility] for monitoring of newly discovered infiltrative [address] and cirrhosis. The cirrhosis was secondary to treated and cured hepatitis C. The active problem list, all pertinent past medical history, medications, liver histology, endoscopic studies, radiologic findings and laboratory findings related to the liver disorder were reviewed with the patient. The patient was seen here in 01/30/2019. He went to the ER after that appointment because he was complaining of blurred vision, lightheadedness, and headache. He was diagnosed with CVA. Mr. Gui Head suffered a severe ischemic CVA on 02/15/2019 and was at [facility] for 5 days. He then was discharged to [facility] where he underwent rehab for 2 weeks. After that, he spent another 2 weeks at [facility].       The patient suffered yet another CVA on 05/24/2019. Hospitalized for one week then to rehab. He was again admitted to [facility] from 08/16 to 08/19/2019 with a TIA. Discharged home with home health. Altogether, the patient has had at least 4 CVA, but \"none since last year\". The patient has esophageal varices without bleeding. His previous UGI bleed was from portal gastropathy. Last EGD was performed in 10/2018. Ascites and lower extremity edema had been well controlled until recently. He presented to the ER last week with lower extremity edema. His diuretics were adjusted to step 3 and he was discharged. He is currently taking lactulose occasionally \"to keep things moving\". HE is reportedly well controlled with Xifaxan. The patient has severe functional limitations due tot he residual effect of multiple CVA's. He is unable to care for himself. His daughter is providing the bulk of the care and assuming the burden of his daily activities. Since the last office appointment the patient:   Has been referred to Dr. Nimesh Woodall for infiltrative [address] No longer in PT or rehab. Daughter providing the bulk of Mr. Chito Young care. She reports that she has a private duty nurse coming in [facility] couple of hours a week\". ALLERGIES:  Allergies   Allergen Reactions    Aspirin Other (comments)     Bleeding      Valium [Diazepam] Swelling     Current Outpatient Medications   Medication Sig    furosemide (LASIX) 40 mg tablet Take two tabs daily by mouth.  aMILoride (MIDAMOR) 5 mg tablet Take 2 tabs daily by mouth.  pantoprazole (PROTONIX) 40 mg tablet Take 1 Tab by mouth daily.  pantoprazole (PROTONIX) 40 mg tablet TAKE 1 TABLET DAILY    furosemide (LASIX) 40 mg tablet Take 1 Tab by mouth daily.  acetaminophen (TYLENOL) 325 mg tablet Take 325 mg by mouth every six (6) hours as needed for Fever or Pain.  aspirin delayed-release 81 mg tablet Take 325 mg by mouth daily.     cyanocobalamin (VITAMIN B12) 500 mcg tablet Take 500 mcg by mouth daily.  rifAXIMin (XIFAXAN) 550 mg tablet Take 1 Tab by mouth two (2) times a day.  atorvastatin (LIPITOR) 10 mg tablet Take 1 Tab by mouth daily.  lactulose (CHRONULAC) 10 gram/15 mL solution Take 20 g by mouth three (3) times daily. Indications: Hepatic Encephalopathy     No current facility-administered medications for this visit. SYSTEM REVIEW NOT RELATED TO LIVER DISEASE OR REVIEWED ABOVE:  Not performed. FAMILY/SOCIAL HISTORY:  The patient is . There are 3 children. The patient smokes tobacco. The patient has been abstinent from alcohol since 2007. The patient is currently on disability. PHYSICAL EXAMINATION:  Not performed.       LABORATORY STUDIES:  Liver [place] [address] & Units 7/9/2020   WBC 4.6 - 13.2 K/uL 5.2   ANC 1.8 - 8.0 K/UL 4.2   HGB 13.0 - 16.0 g/dL 13.4    - 420 K/uL 42 (L)   INR 0.8 - 1.2   1.7 (H)   AST 10 - 38 U/L 110 (H)   ALT 16 - 61 U/L 55   Alk Phos 45 - 117 U/L 214 (H)   Bili, Total 0.2 - 1.0 MG/DL 4.9 (H)   Bili, Direct 0.0 - 0.2 MG/DL    Albumin 3.4 - 5.0 g/dL 2.5 (L)   BUN 7.0 - 18 MG/DL 12   Creat 0.6 - 1.3 MG/DL 0.77   Creat (iSTAT) 0.6 - 1.3 MG/DL    Na 136 - 145 mmol/L 137   K 3.5 - 5.5 mmol/L 4.0   Cl 100 - 111 mmol/L 105   CO2 21 - 32 mmol/L 29   Glucose 74 - 99 mg/dL 92   Ammonia 11 - 32 UMOL/L 43 (H)     Liver [place] [facility] Latest Ref Rng & Units 1/28/2020 9/25/2019   WBC 4.6 - 13.2 K/uL 4.3 (L) 5.8   ANC 1.8 - 8.0 K/UL 3.4 4.5   HGB 13.0 - 16.0 g/dL 12.7 (L) 13.6    - 420 K/uL 42 (L) 55 (L)   INR 0.8 - 1.2   1.6 (H) 1.4 (H)   AST 10 - 38 U/L 51 (H) 58 (H)   ALT 16 - 61 U/L 44 44   Alk Phos 45 - 117 U/L 150 (H) 135 (H)   Bili, Total 0.2 - 1.0 MG/DL 2.6 (H) 2.8 (H)   Bili, Direct 0.0 - 0.2 MG/DL 1.0 (H) 1.0 (H)   Albumin 3.4 - 5.0 g/dL 2.7 (L) 3.0 (L)   BUN 7.0 - 18 MG/DL 12 12   Creat 0.6 - 1.3 MG/DL 0.77 0.80   Creat (iSTAT) 0.6 - 1.3 MG/DL     Na 136 - 145 mmol/L 140 139 K 3.5 - 5.5 mmol/L 4.3 4.4   Cl 100 - 111 mmol/L 106 103   CO2 21 - 32 mmol/L 31 31   Glucose 74 - 99 mg/dL 100 (H) 77   Ammonia 11 - 32 UMOL/L 56 (H) 102 (H)     Cancer Screening Latest Ref Rng & Units 1/28/2020 9/25/2019 7/22/2019   AFP, Serum 0.0 - 8.0 ng/mL 25.3 (H) 10.4 (H) 6.9   AFP-L3% 0.0 - 9.9 % 58.5 (H) 6.4 7.6     SEROLOGIES:  12/10:  HAV total positive, HB surface antigen negative, anti-HB surface negative, HCV genotype 1, HCV RNA log 5.5,    7/2011: Ferritin 73, iron saturation 57%. LIVER HISTOLOGY:  07/2018. TRANSIENT HEPATIC ELASTOGRAPHY:   E Range: 9.44-22.22 kPa  E Mean: 14.32 kPa  E Median: 14.68 kPa  E Std: 3.91 kPa    ENDOSCOPIC PROCEDURES:  9/2010:  Colonoscopy while hospitalized in [address] [place] [address] reported to demonstrate colonic polyps. 9/2010:  EGD while hospitalized in [address] [place] [address] reported to demonstrate severe gastritis from NSAIDs with small esophageal varices. 2/2011: EGD by [facility]. Esophageal varicies. Banding performed. 11/2011:  EGD by [facility]. Severe portal gastropathy. No varices. 4/2012:  EGD by [facility]:  Grade 2 esophageal varices. Banding performed. Severe hypertensive portal gastropathy. 8/2012:  EGD per [facility]. Small esophageal varices. Severe portal hypertensive gastropathy, gastritis of the antrum, no gastric varices. Repeat in 6 months. 10/2013. EGD by Dr Isaac Lopez. Small varices with stigmata of bleeding. Portal gastropathy. 07/2014:  EGD per [facility]. 2 medium esophageal varices. Banding performed. Moderate portal hypertensive gastropathy. Repeat in one year. 9/1/2015: EGD per [facility]. Duodenal ulcer present. H. Pylori negative. Portal hypertensive gastropathy. Esophageal varices present. Four bands placed. Repeat in 6 months. 11/2015:  EGD per Dr. Iram Sheikh. Moderate portal gastropathy. Grade 1 esophageal varices. No gastric varices noted. 11/2015:  Colonoscopy per Dr. Iram Sheikh. Mild diverticulosis. 11/2016:  EGD per Dr. Sis Mcnamara. Small esophageal varices.   Severe hypertensive portal gastropathy. Repeat in 6 months. 01/2018. EGD by Dr. Venus Padilla. No report available. 10/2018. EGD by Dr. Jeremi Gamble. Normal esophagus. Pathology negative. Repeat in 2 years. RADIOLOGY:  11/2010: Ultrasound of the liver. Echogenic consistent with cirrhosis. No liver mass lesions. No ascites. 2/2011: Ultrasound of the liver. Changes consistent with cirrhosis. No liver mass lesions. No ascites. No portal vein thrombosis. 11/2011: Ultrasound of the liver. Echogenic consistent with chronic liver disease, cirrhosis. No liver mass lesions. No ascites. 04/12:  CT of abdomen and pelvis - hepatic cirrhosis with portal hypertension with scattered upper abdominal collateralzation and varices. Likely recanalization of the umbilical artery. No mass noted. 1/2015:  Ultrasound of liver. Echogenic consistent with cirrhosis. No liver mass lesions. No dilated bile ducts. No ascites. 8/2015:  CT of the abdomen and pelvis. Coarsened, micronodular surface of the liver with widening of the interlobar fissure. No intrahepatic biliary ductal dilation. Stable enlargement of the portal vein with recanalization of the umbilical vein. Paraesophageal varices. Grossly stable splenomegaly  1/2016:  Ultrasound of liver. Echogenic consistent with cirrhosis. No liver mass lesions. No dilated bile ducts. No ascites. 11/2016:  Ultrasound of the liver. Echogenic consistent with cirrhosis. No liver mass lesions. No dilated bile ducts. No ascites. 05/2018. Abdominal ultrasound. Coarsened hepatic echogenicity consistent with hepatic parenchymal disease. No focal lesions or biliary dilatation demonstrated. 07/2018. Ultrasound of the liver. Cirrhotic hepatic morphology without focal hepatic lesion. 12/2018. Ultrasound of the liver. Stable right upper abdominal ultrasound, since 6-2018. Findings consistent with cirrhosis/chronic hepatocellular disease.  Recanalized paraumbilical left hepatic vein. No perihepatic ascites. but without evidence of discrete focal mass  01/2019. Brain MRI. \"Probable recent acute/subacute infarct involving the anterior aspect of the left temporal stem. No evidence of associated hemorrhage or mass effect. \"  02/2019. Brain MRI. [facility]). Findings consistent with acute infarcts in the thalamus bilaterally as well as the left basal ganglia and the medial aspect of the left temporal lobe. Subacute infarct in the right basal ganglia which does not have restricted diffusion. 03/2019. Abdominal CT w contrast. ([facility]). There is hepatic cirrhosis with diffuse nodular hepatic contour. Caudate lobe and left lobe liver are small.  No discrete hepatic mass. 09/2019. Ultrasound of the liver. Coarse and heterogeneous hepatic echotexture and nodular contour compatible with reported history of cirrhosis. No discrete hepatic mass identified. Dilated portal vein and recanalized umbilical veins consistent with portal hypertension. 03/2020. Ultrasound of the liver. Cirrhosis. Right hepatic lobe hypoechoic heterogeneous foci measuring 2.8 x 1.8 cm. US LI-RADS - 3.  05/2020. Dynamic MRI of the abdomen. 8.6 cm arterial enhancing mass with heterogeneous areas of washout is present in the right lateral liver which is causing bulging of the capsule. This is associated with tumor thrombus which extends into the right portal vein. LR-TIV. Large recannulized paraumbilical vein is present. Cirrhosis is present.   07/2020. Abdominal CT w/wo contrast.  Infiltrating hepatoma segment 5 and 8 with expansile large tumor thrombus extending into a main portal vein. Additional satellite lesions in left hepatic lobe. Intralesional shunting. LIRADS - 5V. OTHER TESTING:  Not performed. ASSESSMENT AND PLAN:   Infiltrative liver cancer secondary to cirrhosis. Cirrhosis is secondary to treated and cured hepatitis C.         The most recent laboratory studies indicate that the AST is elevated, the ALT is normal, alkaline phosphatase is elevated, tests of hepatic synthetic and metabolic function are depressed, and the platelet count is depressed. Will perform laboratory testing to monitor liver function and degree of liver injury. This will include hepatic panel, a CBC w/ diff, a BMP, a PT/INR, and an AFP-L3%. Infiltrative HCC. The patient has been referred to Dr. Sindhu Rodrigez for treatment . The daughter reports that he will undergo chemoembolization in the next couple of weeks. I spoke with the daughter at length regarding the grim prognosis of infiltrative HCC. Mr. Adriana Gray will likely need hospice care in the near future. He needs Home Health as soon as possible. She is currently providing his home care. Care giver burden. I have recommended the patient speak with Mr. Adriana Gray PCP about a referral to home health care. Ascites and lower extremity edema. I clarified the diuretic dose. The daughter has stopped the amiloride that was provided in place of aldactone. The patient has developed gynecomastia from the aldactone. She replaced the amiloride with aldactone because she finds this more effective. I instructed the daughter in the proper use of step 3 diuretics and reordered aldactone. Complications of cirrhosis were discussed in detail. We discussed thrombocytopenia, portal hypertension, varices, GI bleeding, peripheral edema, ascites, hepatic encephalopathy, and hepatocellular carcinoma. We discussed the need for follow ups on at regular intervals to monitor for complications. We discussed the need for every 6 month liver imaging studies. The patient has multiple ongoing medical issues    HCV treatment. Mr. Valeria Sena completed twenty-four weeks of treatment with George Garza with good toleration in 09/2016. He remained HCV RNA undetectable one year post treatment. CVA. Follow with neurology as directed.       Upper GI bleed appears to have been due to portal gastropathy. Repeat EGD was performed in 10/2018. Normal esophagus. Some gastritis of the atrium. Pathology is negative. Repeat EGD in 10/2020. Hepatic encephalopathy. Continues to have intermittent HE. Now taking lactulose TID. He began Xifaxan last week. He is lucid today. I have ordered an ammonia level today. Lower extremity. The patient is currently on 40 mg lasix and 50 mg aldactone daily. Continue this dose. Edema well controlled. BUN/CRT are WNL. I have spoken to him and his daughter about the importance of salt restrictions. The patient was counseled regarding alcohol consumption. Vaccination for viral hepatitis B is recommended since the patient has no serologic evidence of previous exposure or vaccination with immunity. Vaccination for viral hepatitis A is not required. The patient has serologic evidence of prior exposure or vaccination with immunity. [address] screening will be performed. AFP ordered today. Liver ultrasound ordered. Thrombocytopenia secondary to cirrhosis from chronic HCV. No treatment necessary. All of the above issues were discussed with the patient. All questions were answered. The patient expressed a clear understanding of the above. FOLLOW-UP:  Pursuant to the emergency declaration under the [address] [place] [address], [address] [place] [address] authority and the [employer] Act, this Virtual  Visit was conducted, with the patient's (and/or their legal guardian's) consent, to reduce the patient's risk of exposure to COVID-19 and provide necessary medical care. Services were provided through a video synchronous discussion virtually to substitute for an in-person clinic visit. The patient was located in their home. The provider was located in the [employer] office.        Because of the COVID-19 epidemic all non-emergent diagnostic testing and the in-office visit will be delayed by several months to reduce the risk of patient exposure to and potential infection from the novel corona virus. This follow-up appointment may be delayed further if warranted by the status of the epidemic at that time. Orders to obtain laboratory testing will be mailed to the patient and obtained 1 week prior to the in-person appointment. [address] in 4 weeks, after treatment by Dr. Ernesto Martino.         Andrea Mendez, FNP-C  Liver [place] of [address], [address] [place] [address]   [phone]

## 2020-07-27 ENCOUNTER — HOSPITAL ENCOUNTER (OUTPATIENT)
Dept: INTERVENTIONAL RADIOLOGY/VASCULAR | Age: 65
Discharge: HOME | End: 2020-07-27
Attending: RADIOLOGY | Admitting: RADIOLOGY
Payer: MEDICARE

## 2020-07-27 VITALS
OXYGEN SATURATION: 95 % | RESPIRATION RATE: 11 BRPM | BODY MASS INDEX: 29.12 KG/M2 | WEIGHT: 208 LBS | TEMPERATURE: 98.5 F | HEIGHT: 71 IN | SYSTOLIC BLOOD PRESSURE: 95 MMHG | HEART RATE: 72 BPM | DIASTOLIC BLOOD PRESSURE: 59 MMHG

## 2020-07-27 DIAGNOSIS — C22.0 HEPATOCELLULAR CARCINOMA (HCC): ICD-10-CM

## 2020-07-27 LAB
ALBUMIN SERPL-MCNC: 2.4 G/DL (ref 3.4–5)
ALBUMIN/GLOB SERPL: 0.6 {RATIO} (ref 0.8–1.7)
ALP SERPL-CCNC: 209 U/L (ref 45–117)
ALT SERPL-CCNC: 47 U/L (ref 16–61)
ANION GAP SERPL CALC-SCNC: 8 MMOL/L (ref 3–18)
APTT PPP: 42.5 SEC (ref 23–36.4)
AST SERPL-CCNC: 95 U/L (ref 10–38)
BILIRUB DIRECT SERPL-MCNC: 2.7 MG/DL (ref 0–0.2)
BILIRUB SERPL-MCNC: 6.2 MG/DL (ref 0.2–1)
BUN SERPL-MCNC: 17 MG/DL (ref 7–18)
BUN/CREAT SERPL: 19 (ref 12–20)
CALCIUM SERPL-MCNC: 8.6 MG/DL (ref 8.5–10.1)
CHLORIDE SERPL-SCNC: 101 MMOL/L (ref 100–111)
CO2 SERPL-SCNC: 25 MMOL/L (ref 21–32)
CREAT SERPL-MCNC: 0.9 MG/DL (ref 0.6–1.3)
ERYTHROCYTE [DISTWIDTH] IN BLOOD BY AUTOMATED COUNT: 14.8 % (ref 11.6–14.5)
GLOBULIN SER CALC-MCNC: 4.1 G/DL (ref 2–4)
GLUCOSE SERPL-MCNC: 89 MG/DL (ref 74–99)
HCT VFR BLD AUTO: 39.2 % (ref 36–48)
HGB BLD-MCNC: 14.2 G/DL (ref 13–16)
INR PPP: 1.6 (ref 0.8–1.2)
MCH RBC QN AUTO: 35 PG (ref 24–34)
MCHC RBC AUTO-ENTMCNC: 36.2 G/DL (ref 31–37)
MCV RBC AUTO: 96.6 FL (ref 74–97)
PLATELET # BLD AUTO: 52 K/UL (ref 135–420)
PMV BLD AUTO: 10.5 FL (ref 9.2–11.8)
POTASSIUM SERPL-SCNC: 4.6 MMOL/L (ref 3.5–5.5)
PROT SERPL-MCNC: 6.5 G/DL (ref 6.4–8.2)
PROTHROMBIN TIME: 18.7 SEC (ref 11.5–15.2)
RBC # BLD AUTO: 4.06 M/UL (ref 4.7–5.5)
SODIUM SERPL-SCNC: 134 MMOL/L (ref 136–145)
WBC # BLD AUTO: 6.5 K/UL (ref 4.6–13.2)

## 2020-07-27 PROCEDURE — 77030004561 HC CATH ANGI DX COBRA ANGI -B

## 2020-07-27 PROCEDURE — 77030034823 HC EMB MICRSPHR OCOZENE CELO -H1

## 2020-07-27 PROCEDURE — C1769 GUIDE WIRE: HCPCS

## 2020-07-27 PROCEDURE — 80076 HEPATIC FUNCTION PANEL: CPT

## 2020-07-27 PROCEDURE — 85027 COMPLETE CBC AUTOMATED: CPT

## 2020-07-27 PROCEDURE — 74011250636 HC RX REV CODE- 250/636: Performed by: RADIOLOGY

## 2020-07-27 PROCEDURE — 74011636320 HC RX REV CODE- 636/320: Performed by: RADIOLOGY

## 2020-07-27 PROCEDURE — 74011000250 HC RX REV CODE- 250: Performed by: RADIOLOGY

## 2020-07-27 PROCEDURE — 80048 BASIC METABOLIC PNL TOTAL CA: CPT

## 2020-07-27 PROCEDURE — 74011250636 HC RX REV CODE- 250/636

## 2020-07-27 PROCEDURE — 74011000258 HC RX REV CODE- 258: Performed by: RADIOLOGY

## 2020-07-27 PROCEDURE — C9113 INJ PANTOPRAZOLE SODIUM, VIA: HCPCS | Performed by: RADIOLOGY

## 2020-07-27 PROCEDURE — 85730 THROMBOPLASTIN TIME PARTIAL: CPT

## 2020-07-27 PROCEDURE — C1760 CLOSURE DEV, VASC: HCPCS

## 2020-07-27 PROCEDURE — 77030037141 HC CATH MIC PX SLIM PENU -F

## 2020-07-27 PROCEDURE — 85610 PROTHROMBIN TIME: CPT

## 2020-07-27 PROCEDURE — 99152 MOD SED SAME PHYS/QHP 5/>YRS: CPT

## 2020-07-27 RX ORDER — DIPHENHYDRAMINE HYDROCHLORIDE 50 MG/ML
INJECTION, SOLUTION INTRAMUSCULAR; INTRAVENOUS
Status: COMPLETED
Start: 2020-07-27 | End: 2020-07-27

## 2020-07-27 RX ORDER — CIPROFLOXACIN 2 MG/ML
400 INJECTION, SOLUTION INTRAVENOUS
Status: COMPLETED | OUTPATIENT
Start: 2020-07-27 | End: 2020-07-27

## 2020-07-27 RX ORDER — MIDAZOLAM HYDROCHLORIDE 1 MG/ML
1 INJECTION, SOLUTION INTRAMUSCULAR; INTRAVENOUS
Status: DISCONTINUED | OUTPATIENT
Start: 2020-07-27 | End: 2020-07-27 | Stop reason: ALTCHOICE

## 2020-07-27 RX ORDER — LIDOCAINE HYDROCHLORIDE 10 MG/ML
30 INJECTION, SOLUTION EPIDURAL; INFILTRATION; INTRACAUDAL; PERINEURAL ONCE
Status: COMPLETED | OUTPATIENT
Start: 2020-07-27 | End: 2020-07-27

## 2020-07-27 RX ORDER — ONDANSETRON 2 MG/ML
4 INJECTION INTRAMUSCULAR; INTRAVENOUS
Status: COMPLETED | OUTPATIENT
Start: 2020-07-27 | End: 2020-07-27

## 2020-07-27 RX ORDER — FENTANYL CITRATE 50 UG/ML
12.5-5 INJECTION, SOLUTION INTRAMUSCULAR; INTRAVENOUS
Status: DISCONTINUED | OUTPATIENT
Start: 2020-07-27 | End: 2020-07-27 | Stop reason: ALTCHOICE

## 2020-07-27 RX ORDER — SODIUM CHLORIDE 9 MG/ML
20 INJECTION, SOLUTION INTRAVENOUS CONTINUOUS
Status: DISCONTINUED | OUTPATIENT
Start: 2020-07-27 | End: 2020-07-27 | Stop reason: HOSPADM

## 2020-07-27 RX ORDER — DIPHENHYDRAMINE HYDROCHLORIDE 50 MG/ML
25-50 INJECTION, SOLUTION INTRAMUSCULAR; INTRAVENOUS ONCE
Status: COMPLETED | OUTPATIENT
Start: 2020-07-27 | End: 2020-07-27

## 2020-07-27 RX ORDER — IODIXANOL 320 MG/ML
200 INJECTION, SOLUTION INTRAVASCULAR
Status: COMPLETED | OUTPATIENT
Start: 2020-07-27 | End: 2020-07-27

## 2020-07-27 RX ORDER — HEPARIN SODIUM 200 [USP'U]/100ML
INJECTION, SOLUTION INTRAVENOUS
Status: DISCONTINUED
Start: 2020-07-27 | End: 2020-07-27 | Stop reason: HOSPADM

## 2020-07-27 RX ADMIN — ONDANSETRON HYDROCHLORIDE 4 MG: 2 SOLUTION INTRAMUSCULAR; INTRAVENOUS at 09:52

## 2020-07-27 RX ADMIN — DIPHENHYDRAMINE HYDROCHLORIDE 50 MG: 50 INJECTION, SOLUTION INTRAMUSCULAR; INTRAVENOUS at 09:55

## 2020-07-27 RX ADMIN — ONDANSETRON 4 MG: 2 INJECTION INTRAMUSCULAR; INTRAVENOUS at 13:05

## 2020-07-27 RX ADMIN — DOXORUBICIN HYDROCHLORIDE: 2 INJECTION, POWDER, LYOPHILIZED, FOR SOLUTION INTRAVENOUS at 12:48

## 2020-07-27 RX ADMIN — CIPROFLOXACIN 400 MG: 2 INJECTION, SOLUTION INTRAVENOUS at 11:45

## 2020-07-27 RX ADMIN — FENTANYL CITRATE 25 MCG: 50 INJECTION, SOLUTION INTRAMUSCULAR; INTRAVENOUS at 12:35

## 2020-07-27 RX ADMIN — SODIUM CHLORIDE 20 ML/HR: 900 INJECTION, SOLUTION INTRAVENOUS at 09:58

## 2020-07-27 RX ADMIN — MIDAZOLAM HYDROCHLORIDE 0.5 MG: 2 INJECTION, SOLUTION INTRAMUSCULAR; INTRAVENOUS at 12:25

## 2020-07-27 RX ADMIN — FENTANYL CITRATE 25 MCG: 50 INJECTION, SOLUTION INTRAMUSCULAR; INTRAVENOUS at 12:25

## 2020-07-27 RX ADMIN — FENTANYL CITRATE 50 MCG: 50 INJECTION, SOLUTION INTRAMUSCULAR; INTRAVENOUS at 12:10

## 2020-07-27 RX ADMIN — IODIXANOL 200 ML: 320 INJECTION, SOLUTION INTRAVASCULAR at 12:21

## 2020-07-27 RX ADMIN — SODIUM CHLORIDE 40 MG: 9 INJECTION, SOLUTION INTRAMUSCULAR; INTRAVENOUS; SUBCUTANEOUS at 09:51

## 2020-07-27 RX ADMIN — DEXAMETHASONE SODIUM PHOSPHATE 20 MG: 4 INJECTION, SOLUTION INTRAMUSCULAR; INTRAVENOUS at 09:33

## 2020-07-27 RX ADMIN — MIDAZOLAM HYDROCHLORIDE 1 MG: 2 INJECTION, SOLUTION INTRAMUSCULAR; INTRAVENOUS at 12:10

## 2020-07-27 RX ADMIN — LIDOCAINE HYDROCHLORIDE 30 ML: 10 INJECTION, SOLUTION EPIDURAL; INFILTRATION; INTRACAUDAL; PERINEURAL at 12:13

## 2020-07-27 RX ADMIN — MIDAZOLAM HYDROCHLORIDE 0.5 MG: 2 INJECTION, SOLUTION INTRAMUSCULAR; INTRAVENOUS at 12:35

## 2020-07-27 NOTE — PROGRESS NOTES
Verbal report given to Aidee Sampson RN in [facility]. Patient NAD, VSS. Patient asleep, eyes open to voice. Patient able to maintain oral airway and does not need supplemental O2 at this time. All questions answered.

## 2020-07-27 NOTE — PROCEDURES
RADIOLOGY POST PROCEDURE NOTE     July 27, 2020       1:13 PM     Preoperative Diagnosis:   HCC. Postoperative Diagnosis:  Same. :  Dr. Jamari Coronado    Assistant:  None. Type of Anesthesia: 1% plain lidocaine and IV moderate sedation with Versed and Fentanyl. Procedure/Description:  Image guided chemoembolization of right hepatic lobe HCC with large tumor thrombus. Findings:   No bleeding. Estimated blood Loss:  Minimal    Specimen Removed:   no    Blood transfusions:  None. Implants:  None.     Complications: None    Condition: Stable    Discharge Plan:  discharge home     Sudhir Granda MD

## 2020-07-27 NOTE — ROUTINE PROCESS
Alert to person and place as when received. Able stand and transfer with assistance. Right groin dressing intact, no bleeding or swelling. Discharge instructions reviewed with the patient. Escorted to car, tot his daughter for transport.

## 2020-07-27 NOTE — DISCHARGE INSTRUCTIONS
DISCHARGE SUMMARY from Nurse    PATIENT INSTRUCTIONS:    After general anesthesia or intravenous sedation, for 24 hours or while taking prescription Narcotics:  · Limit your activities  · Do not drive and operate hazardous machinery  · Do not make important personal or business decisions  · Do  not drink alcoholic beverages  · If you have not urinated within 8 hours after discharge, please contact your surgeon on call. Report the following to your surgeon:  · Excessive pain, swelling, redness or odor of or around the surgical area  · Temperature over 100.5  · Nausea and vomiting lasting longer than 4 hours or if unable to take medications  · Any signs of decreased circulation or nerve impairment to extremity: change in color, persistent  numbness, tingling, coldness or increase pain  · Any questions    What to do at Home:  Recommended activity: No lifting, Driving, or Strenuous exercise for 24 hours. If you experience any of the following symptoms bleeding,swelling,acute pain or numbness, fever, please follow up with Dr. Eddie Milton MD.    *  Please give a list of your current medications to your Primary Care Provider. *  Please update this list whenever your medications are discontinued, doses are      changed, or new medications (including over-the-counter products) are added. *  Please carry medication information at all times in case of emergency situations. These are general instructions for a healthy lifestyle:    No smoking/ No tobacco products/ Avoid exposure to second hand smoke  Surgeon General's Warning:  Quitting smoking now greatly reduces serious risk to your health.     Obesity, smoking, and sedentary lifestyle greatly increases your risk for illness    A healthy diet, regular physical exercise & weight monitoring are important for maintaining a healthy lifestyle    You may be retaining fluid if you have a history of heart failure or if you experience any of the following symptoms:  Weight gain of 3 pounds or more overnight or 5 pounds in a week, increased swelling in our hands or feet or shortness of breath while lying flat in bed. Please call your doctor as soon as you notice any of these symptoms; do not wait until your next office visit. The discharge information has been reviewed with the patient. The patient verbalized understanding. Discharge medications reviewed with the patient and appropriate educational materials and side effects teaching were provided. ___________________________________________________________________________________________________________________________________Chemoembolization Discharge Instructions    General Information:  Chemoembolization is a procedure used to treat a tumor in your liver. Using angiography (a medical imaging technique) your doctor has inserted beads that have been soaked in a chemotherapy agent directly into the tumor. The goal of this therapy is to cut off the blood supply to the tumor and deliver the cancer killing drugs directly into the tumor, thus slowing growth. Home Care Instructions: You can resume your regular diet. Do not tub bathe, swim, drink alcohol, or make any important legal decisions for the next 48 hours. Do not lift anything heavier than a gallon of milk for 5 days. If you take Glucophage (metformin) for diabetes, do not take it for the next 48 hours. Over the next week you may experience abdominal pain, nausea, vomiting, and a low grade fever. You will be given a prescription to deal with these symptoms. Follow-Up Instructions:  Please see your ordering doctor as he/she has requested. Call If:   You should call your Physician and/or the Radiology Nurse if you have any signs of infection like fever, drainage, redness, and/or swelling. If the puncture site should ooze, please call.  Also call if you have any pain, decreased sensation, numbness, tingling, swelling, or change in color to the affected extremity. SEEK IMMEDIATE MEDICAL CARE IF YOUR PUNCTURE SITE STARTS TO BLEED. APPLY ENOUGH FIRM PRESSURE TO THE SITE WITH THE TIPS OF YOUR FINGERS TO STOP THE BLEEDING. Arterial bleeding is a medical emergency and should be evaluated immediately. Call your doctor immediately if you develop a fever greater than 101.5°F, shaking, chills, or dizziness. Also notify your doctor if you develop severe right upper abdominal pain or nausea/vomiting and the symptoms are not relieved by medication. To Reach Us: Interventional Radiology Lab: [phone]. Patient armband removed and shredded  MyChart Activation    Thank you for requesting access to [employer]. Please follow the instructions below to securely access and download your online medical record. [employer] allows you to send messages to your doctor, view your test results, renew your prescriptions, schedule appointments, and more. How Do I Sign Up? 1. In your internet browser, go to [email or web address]. 2. Click on the First Time User? Click Here link in the Sign In box. You will see the New Member Sign Up page. 3. Enter your [employer] Access Code exactly as it appears below. You will not need to use this code after youve completed the sign-up process. If you do not sign up before the expiration date, you must request a new code. MyChart Access Code: Activation code not generated  Current [employer] Status: Active (This is the date your [employer] access code will )    4. Enter the last four digits of your Social Security Number (xxxx) and Date of Birth (mm/dd/yyyy) as indicated and click Submit. You will be taken to the next sign-up page. 5. Create a [employer] ID. This will be your [employer] login ID and cannot be changed, so think of one that is secure and easy to remember. 6. Create a [employer] password. You can change your password at any time. 7. Enter your Password Reset Question and Answer.  This can be used at a later time if you forget your password. 8. Enter your e-mail address. You will receive e-mail notification when new information is available in [address] 9. Click Sign Up. You can now view and download portions of your medical record. 10. Click the Download Summary menu link to download a portable copy of your medical information. Additional Information    If you have questions, please visit the Frequently Asked Questions section of the [employer] website at [email or web address] Remember, [employer] is NOT to be used for urgent needs. For medical emergencies, dial 911.

## 2020-07-27 NOTE — H&P
OUTPATIENT HISTORY AND PHYSICAL      Today 7/27/2020     Indication/Symptoms:   Ivett Jarquin is a 62 y.o. male with a history of invasive hepatoma who presents for an image-guided mesenteric angiogram with palliative chemoembolization and moderate sedation. Patient has been NPO since midnight and last took ASA on Friday. Current Meds:    Prior to Admission medications    Medication Sig Start Date End Date Taking? Authorizing Provider   escitalopram oxalate (LEXAPRO) 10 mg tablet TAKE 1 TABLET BY MOUTH EVERY DAY 12/26/19  Yes Provider, Historical   cholecalciferol (VITAMIN D3) 25 mcg (1,000 unit) cap Take  by mouth daily. Yes Provider, Historical   spironolactone (ALDACTONE) 100 mg tablet Take 3 Tabs by mouth daily. 7/14/20  Yes Kyle Joshi NP   pantoprazole (PROTONIX) 40 mg tablet TAKE 1 TABLET DAILY 6/8/20  Yes Kyle Joshi NP   furosemide (LASIX) 40 mg tablet Take 1 Tab by mouth daily. 9/25/19  Yes Kyle Joshi NP   cyanocobalamin (VITAMIN B12) 500 mcg tablet Take 500 mcg by mouth daily. Yes Varinder Miller MD   rifAXIMin Yuliya Matt) 550 mg tablet Take 1 Tab by mouth two (2) times a day. 2/7/19  Yes Kyle Joshi NP   atorvastatin (LIPITOR) 10 mg tablet Take 1 Tab by mouth daily. 2/4/19  Yes Varinder Miller MD   lactulose (CHRONULAC) 10 gram/15 mL solution Take 20 g by mouth three (3) times daily. Indications: Hepatic Encephalopathy   Yes Provider, Historical   aspirin delayed-release 81 mg tablet Take 325 mg by mouth daily. Provider, Historical       Allergies:       Allergies   Allergen Reactions    Aspirin Other (comments)     Bleeding      Valium [Diazepam] Swelling       Comorbid Conditions:    Past Medical History:   Diagnosis Date    Cancer ([facility] [address] 11/2011    basal cell carcinoma/L cheek    Cirrhosis (HCC)     Secondary to chronic HCV    Contact dermatitis and other eczema, due to unspecified cause     Esophageal varices in cirrhosis (HCC)     GERD (gastroesophageal reflux disease)     H/O in the past    Hep C w/ coma, chronic ([facility] [address]     cured from Hep C     Hepatitis C     Cured in 2-2016    Ill-defined condition     Liver disease     Other ill-defined conditions(799.89)     hands and bottom of feet going numb lately    Skin cancer     BCC left cheek 2011    Stroke ([facility] [address] 02/15/2019    Stroke ([facility] [address]     Stroke ([facility] [address] 08/16/2019    Upper GI bleed     Secondary to NSAID induced gastritis, 09/10. Past Surgical History:   Procedure Laterality Date    EGD  09/10    EGD while hospitalized in [place]-reported to demonstrate severe gastritis from [facility] with small esophageal varices.  ENDOSCOPY, COLON, DIAGNOSTIC  09/10    Colonoscopy while in hospitalized in [facility]- reported to demonstrate colonic polyps    HX CATARACT REMOVAL  2018    HX HERNIA REPAIR      umbilical    HX LUMBAR LAMINECTOMY  1992    HX MALIGNANT SKIN LESION EXCISION  11/1/2011    standard excision to L cheek d/t basal cell carcinoma    HX TONSILLECTOMY       Data:    Visit Vitals  /65 (BP 1 Location: Left arm, BP Patient Position: Head of bed elevated (Comment degrees))   Pulse 72   Temp 98.5 °F (36.9 °C)   Resp 18   Ht 5' 11\" (1.803 m)   Wt 94.3 kg (208 lb)   SpO2 97%   BMI 29.01 kg/m²   :  No results for input(s): PLT, PLTEXT in the last 72 hours. No lab exists for component:  HCT  No results for input(s): INR, APTT, INREXT in the last 72 hours. No lab exists for component: PT    The H & P and/or progress notes and any available imaging were reviewed. The risks, indications and possible alternatives to the procedure, including doing nothing, were discussed and informed consent was obtained. Physical Exam:      Mental status:   Alert and oriented. Examination specific to the procedure proposed to be performed and any co morbid conditions:   Mallampati classification 2 ,  ASA 3   Heart:   Regular rate. Lungs:   Normal respiratory effort.  Symmetrical rise and fall of chest    The patient is an appropriate candidate to undergo the planned procedure and sedation.     Joanie Downs

## 2020-08-19 ENCOUNTER — VIRTUAL VISIT (OUTPATIENT)
Dept: HEMATOLOGY | Age: 65
End: 2020-08-19

## 2020-08-19 DIAGNOSIS — C22.0 HEPATOCELLULAR CARCINOMA (HCC): Primary | ICD-10-CM

## 2020-08-19 RX ORDER — CEFUROXIME AXETIL 500 MG/1
TABLET ORAL
Qty: 20 TAB | Refills: 0 | Status: SHIPPED | OUTPATIENT
Start: 2020-08-19

## 2020-08-19 RX ORDER — LACTULOSE 10 G/15ML
30 SOLUTION ORAL; RECTAL 2 TIMES DAILY
Qty: 1892 ML | Refills: 5 | Status: SHIPPED | OUTPATIENT
Start: 2020-08-19

## 2020-08-19 RX ORDER — OXYCODONE AND ACETAMINOPHEN 5; 325 MG/1; MG/1
1 TABLET ORAL
Qty: 90 TAB | Refills: 0 | Status: SHIPPED | OUTPATIENT
Start: 2020-08-19 | End: 2020-09-18

## 2020-08-19 NOTE — PROGRESS NOTES
VIRTUAL TELEHEALTH VISIT PERFORMED DUE TO COVID-19 EPIDEMIC    CONSENT:  Ina Rothman, who was seen by synchronous, real-time, audio-video technology, and/or his healthcare decision maker, is aware that this patient-initiated, Telehealth encounter on 8/19/2020 is a billable service, with coverage as determined by his insurance carrier. He is aware that he may receive a bill and has provided verbal consent to proceed. This patient was evaluated during a Virtual Telehealth visit. A caregiver was present if appropriate. Due to this being a TeleHealth encounter performed during the JWKQS-15 public health emergency, the physical examination was limited to that listed in the [address] [place] [address], MD, [address], [place]ite Suly Mcguire, Ramos Freire, MD Cierra Pennington PAJAQUELINE Hart, ACNP-BC     April S Jalyn, AGNP-BC   Conrado Martinez, y [address], AGNP-[place] [address]    at [place] [address].    [phone]    FAX: [id number]    at [address], [address]    [phone]    [address] [place] [address]: [phone]       Patient Care Team:  Flores Sullivan MD as PCP - General (Internal Medicine)  Art To MD (Neurology)  Ramirez Alonso MD (Psychiatry)  Susana Lim MD (Radiology)      Problem List  Date Reviewed: 7/14/2020          Codes Class Noted    Hepatitis C virus infection cured after antiviral drug therapy ICD-10-CM: Z86.19  ICD-9-CM: V12.09  1/28/2020    Overview Signed 1/28/2020 10:39 AM by Sonali Iyer NP     Cured with 24 weeks of [place] Ker[place] 09/2016.                Hepatic encephalopathy ([facility] ICD-10-CM: K72.90  ICD-9-CM: 572.2  2/1/2019        CVA (cerebral vascular accident) [facility]) ICD-10-CM: I63.9  ICD-9-CM: 434.91  1/30/2019        Elevated INR ICD-10-CM: R79.1  ICD-9-CM: 790.92  1/30/2019        Back pain ICD-10-CM: M54.9  ICD-9-CM: 724.5  1/30/2019        Chest pressure ICD-10-CM: R07.89  ICD-9-CM: 786.59  1/30/2019        Cellulitis ICD-10-CM: L03.90  ICD-9-CM: 682.9  7/24/2018        Portal hypertensive gastropathy ([facility] ICD-10-CM: K76.6, K31.89  ICD-9-CM: 572.3, 537.89  12/22/2013        Cirrhosis ([facility] ICD-10-CM: K74.60  ICD-9-CM: 571.5  9/5/2011        Upper GI bleed secondary to NSAID induced gastritis - 9/2010 ICD-10-CM: K92.2  ICD-9-CM: 578.9  9/5/2011        S/P lumbar laminectomy with spinal fusion - 1992 ICD-10-CM: Z98.1  ICD-9-CM: V45.4  9/5/2011        Colon polyps ICD-10-CM: K63.5  ICD-9-CM: 211.3  9/5/2011    Overview Signed 9/5/2011 10:38 AM by Rolando Rodríguez MD     Last colonoscopy 9/2010             Esophageal varices ([facility] ICD-10-CM: I85.00  ICD-9-CM: 456.1  9/5/2011        Thrombocytopenia secondary to cirrhosis ICD-10-CM: D69.6  ICD-9-CM: 287.5  9/5/2011              Ashely Ly was seen today via virtual visit at the [facility] for monitoring of newly discovered infiltrative [address] and cirrhosis. The cirrhosis was secondary to treated and cured hepatitis C. The active problem list, all pertinent past medical history, medications, liver histology, endoscopic studies, radiologic findings and laboratory findings related to the liver disorder were reviewed with the patient. The patient was seen here in 01/30/2019. He went to the ER after that appointment because he was complaining of blurred vision, lightheadedness, and headache. He was diagnosed with CVA. Mr. Brook Colbert suffered a severe ischemic CVA on 02/15/2019 and was at [facility] for 5 days. He then was discharged to [facility] where he underwent rehab for 2 weeks. After that, he spent another 2 weeks at [facility]. The patient suffered yet another CVA on 05/24/2019. Hospitalized for one week then to rehab. He was again admitted to [facility] from 08/16 to 08/19/2019 with a TIA. Discharged home with home health. Altogether, the patient has had at least 4 CVA, but \"none since last year\". The patient has esophageal varices without bleeding. His previous UGI bleed was from portal gastropathy. Last EGD was performed in 10/2018. Ascites and lower extremity edema had been well controlled until recently. He presented to the ER last week with lower extremity edema. His diuretics were adjusted to step 3 and he was discharged. He is currently taking lactulose occasionally \"to keep things moving\". HE is reportedly well controlled with Xifaxan. The patient has severe functional limitations due tot he residual effect of multiple CVA's. He is unable to care for himself. His daughter is providing the bulk of the care and assuming the burden of his daily activities. Since the last office appointment the patient:   [address] has been treated by Dr. Melayn Montes. Had at least two \"seizure episodes\"  Was admitted to [facility], discharged to home. Had a hospice referral, but has now switched to [facility] care as it appears he is improving. ALLERGIES:  Allergies   Allergen Reactions    Aspirin Other (comments)     Bleeding      Valium [Diazepam] Swelling     Current Outpatient Medications   Medication Sig    escitalopram oxalate (LEXAPRO) 10 mg tablet TAKE 1 TABLET BY MOUTH EVERY DAY    cholecalciferol (VITAMIN D3) 25 mcg (1,000 unit) cap Take  by mouth daily.  spironolactone (ALDACTONE) 100 mg tablet Take 3 Tabs by mouth daily.  pantoprazole (PROTONIX) 40 mg tablet TAKE 1 TABLET DAILY    furosemide (LASIX) 40 mg tablet Take 1 Tab by mouth daily.  aspirin delayed-release 81 mg tablet Take 325 mg by mouth daily.     cyanocobalamin (VITAMIN B12) 500 mcg tablet Take 500 mcg by mouth daily.  rifAXIMin (XIFAXAN) 550 mg tablet Take 1 Tab by mouth two (2) times a day.  atorvastatin (LIPITOR) 10 mg tablet Take 1 Tab by mouth daily.  lactulose (CHRONULAC) 10 gram/15 mL solution Take 20 g by mouth three (3) times daily. Indications: Hepatic Encephalopathy     No current facility-administered medications for this visit. SYSTEM REVIEW NOT RELATED TO LIVER DISEASE OR REVIEWED ABOVE:  Not performed. FAMILY/SOCIAL HISTORY:  The patient is . There are 3 children. The patient smokes tobacco. The patient has been abstinent from alcohol since 2007. The patient is currently on disability. PHYSICAL EXAMINATION PERFORMED BY VIRTUAL TELEHEALTH:  VS: Not performed   General: No acute distress. Eyes: Sclera anicteric. ENT: No oral lesions. Skin: No rashes. spider angiomata. No jaundice. Abdomen: No obvious distention suggesting ascites. Extremities: No edema. No muscle wasting. Left arm with apparent cellulites.         LABORATORY STUDIES:  Liver [place] of [address] [place] [address] & Units 7/27/2020 7/9/2020   WBC 4.6 - 13.2 K/uL 6.5 5.2   ANC 1.8 - 8.0 K/UL  4.2   HGB 13.0 - 16.0 g/dL 14.2 13.4    - 420 K/uL 52 (L) 42 (L)   INR 0.8 - 1.2   1.6 (H) 1.7 (H)   AST 10 - 38 U/L 95 (H) 110 (H)   ALT 16 - 61 U/L 47 55   Alk Phos 45 - 117 U/L 209 (H) 214 (H)   Bili, Total 0.2 - 1.0 MG/DL 6.2 (H) 4.9 (H)   Bili, Direct 0.0 - 0.2 MG/DL 2.7 (H)    Albumin 3.4 - 5.0 g/dL 2.4 (L) 2.5 (L)   BUN 7.0 - 18 MG/DL 17 12   Creat 0.6 - 1.3 MG/DL 0.90 0.77   Creat (iSTAT) 0.6 - 1.3 MG/DL     Na 136 - 145 mmol/L 134 (L) 137   K 3.5 - 5.5 mmol/L 4.6 4.0   Cl 100 - 111 mmol/L 101 105   CO2 21 - 32 mmol/L 25 29   Glucose 74 - 99 mg/dL 89 92   Ammonia 11 - 32 UMOL/L  43 (H)     Cancer Screening Latest Ref Rng & Units 1/28/2020 9/25/2019 7/22/2019   AFP, Serum 0.0 - 8.0 ng/mL 25.3 (H) 10.4 (H) 6.9   AFP-L3% 0.0 - 9.9 % 58.5 (H) 6.4 7.6     SEROLOGIES:  12/10:  HAV total positive, HB surface antigen negative, anti-HB surface negative, HCV genotype 1, HCV RNA log 5.5,    7/2011: Ferritin 73, iron saturation 57%. LIVER HISTOLOGY:  07/2018. TRANSIENT HEPATIC ELASTOGRAPHY:   E Range: 9.44-22.22 kPa  E Mean: 14.32 kPa  E Median: 14.68 kPa  E Std: 3.91 kPa    ENDOSCOPIC PROCEDURES:  9/2010:  Colonoscopy while hospitalized in [address] reported to demonstrate colonic polyps. 9/2010:  EGD while hospitalized in [address] reported to demonstrate severe gastritis from NSAIDs with small esophageal varices. 2/2011: EGD by [facility]. Esophageal varicies. Banding performed. 11/2011:  EGD by [facility]. Severe portal gastropathy. No varices. 4/2012:  EGD by [facility]:  Grade 2 esophageal varices. Banding performed. Severe hypertensive portal gastropathy. 8/2012:  EGD per [facility]. Small esophageal varices. Severe portal hypertensive gastropathy, gastritis of the antrum, no gastric varices. Repeat in 6 months. 10/2013. EGD by Dr Strickland. Small varices with stigmata of bleeding. Portal gastropathy. 07/2014:  EGD per [facility]. 2 medium esophageal varices. Banding performed. Moderate portal hypertensive gastropathy. Repeat in one year. 9/1/2015: EGD per [facility]. Duodenal ulcer present. H. Pylori negative. Portal hypertensive gastropathy. Esophageal varices present. Four bands placed. Repeat in 6 months. 11/2015:  EGD per Dr. Abram Gerard. Moderate portal gastropathy. Grade 1 esophageal varices. No gastric varices noted. 11/2015:  Colonoscopy per Dr. Abram Gerard. Mild diverticulosis. 11/2016:  EGD per Dr. Jovita Ribera. Small esophageal varices. Severe hypertensive portal gastropathy. Repeat in 6 months. 01/2018. EGD by Dr. Sage Barrett. No report available. 10/2018. EGD by Dr. Phyllis Langley. Normal esophagus. Pathology negative. Repeat in 2 years. RADIOLOGY:  11/2010: Ultrasound of the liver. Echogenic consistent with cirrhosis. No liver mass lesions. No ascites. 2/2011: Ultrasound of the liver.   Changes consistent with cirrhosis. No liver mass lesions. No ascites. No portal vein thrombosis. 11/2011: Ultrasound of the liver. Echogenic consistent with chronic liver disease, cirrhosis. No liver mass lesions. No ascites. 04/12:  CT of abdomen and pelvis - hepatic cirrhosis with portal hypertension with scattered upper abdominal collateralzation and varices. Likely recanalization of the umbilical artery. No mass noted. 1/2015:  Ultrasound of liver. Echogenic consistent with cirrhosis. No liver mass lesions. No dilated bile ducts. No ascites. 8/2015:  CT of the abdomen and pelvis. Coarsened, micronodular surface of the liver with widening of the interlobar fissure. No intrahepatic biliary ductal dilation. Stable enlargement of the portal vein with recanalization of the umbilical vein. Paraesophageal varices. Grossly stable splenomegaly  1/2016:  Ultrasound of liver. Echogenic consistent with cirrhosis. No liver mass lesions. No dilated bile ducts. No ascites. 11/2016:  Ultrasound of the liver. Echogenic consistent with cirrhosis. No liver mass lesions. No dilated bile ducts. No ascites. 05/2018. Abdominal ultrasound. Coarsened hepatic echogenicity consistent with hepatic parenchymal disease. No focal lesions or biliary dilatation demonstrated. 07/2018. Ultrasound of the liver. Cirrhotic hepatic morphology without focal hepatic lesion. 12/2018. Ultrasound of the liver. Stable right upper abdominal ultrasound, since 6-2018. Findings consistent with cirrhosis/chronic hepatocellular disease. Recanalized paraumbilical left hepatic vein. No perihepatic ascites. but without evidence of discrete focal mass  01/2019. Brain MRI. \"Probable recent acute/subacute infarct involving the anterior aspect of the left temporal stem. No evidence of associated hemorrhage or mass effect. \"  02/2019. Brain MRI. [facility]).  Findings consistent with acute infarcts in the thalamus bilaterally as well as the left basal ganglia and the medial aspect of the left temporal lobe. Subacute infarct in the right basal ganglia which does not have restricted diffusion. 03/2019. Abdominal CT w contrast. ([facility] There is hepatic cirrhosis with diffuse nodular hepatic contour. Caudate lobe and left lobe liver are small.  No discrete hepatic mass. 09/2019. Ultrasound of the liver. Coarse and heterogeneous hepatic echotexture and nodular contour compatible with reported history of cirrhosis. No discrete hepatic mass identified. Dilated portal vein and recanalized umbilical veins consistent with portal hypertension. 03/2020. Ultrasound of the liver. Cirrhosis. Right hepatic lobe hypoechoic heterogeneous foci measuring 2.8 x 1.8 cm. US LI-RADS - 3.  05/2020. Dynamic MRI of the abdomen. 8.6 cm arterial enhancing mass with heterogeneous areas of washout is present in the right lateral liver which is causing bulging of the capsule. This is associated with tumor thrombus which extends into the right portal vein. LR-TIV. Large recannulized paraumbilical vein is present. Cirrhosis is present.   07/2020. Abdominal CT w/wo contrast.  Infiltrating hepatoma segment 5 and 8 with expansile large tumor thrombus extending into a main portal vein. Additional satellite lesions in left hepatic lobe. Intralesional shunting. LIRADS - 5V. OTHER TESTING:  Not performed. ASSESSMENT AND PLAN:   Infiltrative liver cancer secondary to cirrhosis. Cirrhosis is secondary to treated and cured hepatitis C. The most recent laboratory studies indicate that the AST is elevated, the ALT is normal, alkaline phosphatase is elevated, tests of hepatic synthetic and metabolic function are depressed, and the platelet count is depressed. Will perform laboratory testing to monitor liver function and degree of liver injury. This will include hepatic panel, a CBC w/ diff, a BMP, a PT/INR, and an AFP-L3%. Infiltrative HCC.   The patient has been referred to Dr. Linda Hays for treatment . Underwent chemoembolization on 07/27/2020 by Dr. Linda Hays. I spoke with the daughter at length regarding the grim prognosis of infiltrative HCC. Mr. Tatiana Ward will likely need hospice care in the near future. He needs Home Health as soon as possible. Care giver burden. JONATHON CONLEY [facility] is scheduled for appointment tomorrow. Pain. Patient's primary care giver (his daughter Adriana Hills) tells me that home health care has asked that we provide pain medications for Mr. Susie Peterson. Percocet 5/325 X 90 tablets provided. Ascites and lower extremity edema. I clarified the diuretic dose. Cellulitis. The patient appears to have cellulites of his left arm on exam.  Daughter reports the area is hot to the touch. Ceftin provided. Complications of cirrhosis were discussed in detail. We discussed thrombocytopenia, portal hypertension, varices, GI bleeding, peripheral edema, ascites, hepatic encephalopathy, and hepatocellular carcinoma. We discussed the need for follow ups on at regular intervals to monitor for complications. We discussed the need for every 6 month liver imaging studies. The patient has multiple ongoing medical issues    HCV treatment. Mr. Susie Peterson completed twenty-four weeks of treatment with Susan Grimaldo with good toleration in 09/2016. He remained HCV RNA undetectable one year post treatment. CVA. Follow with neurology as directed. Upper GI bleed appears to have been due to portal gastropathy. Repeat EGD was performed in 10/2018. Normal esophagus. Some gastritis of the atrium. Pathology is negative. Repeat EGD in 10/2020. Hepatic encephalopathy. Continues to have intermittent HE. Now taking lactulose TID. He began Xifaxan last week. He is lucid today. I have ordered an ammonia level today. Lower extremity. The patient is currently on 40 mg lasix and 50 mg aldactone daily. Continue this dose.   Edema well controlled. BUN/CRT are WNL. I have spoken to him and his daughter about the importance of salt restrictions. The patient was counseled regarding alcohol consumption. Vaccination for viral hepatitis B is recommended since the patient has no serologic evidence of previous exposure or vaccination with immunity. Vaccination for viral hepatitis A is not required. The patient has serologic evidence of prior exposure or vaccination with immunity. [address] screening will be performed. AFP ordered today. Liver ultrasound ordered. Thrombocytopenia secondary to cirrhosis from chronic HCV. No treatment necessary. All of the above issues were discussed with the patient. All questions were answered. The patient expressed a clear understanding of the above. FOLLOW-UP:  Pursuant to the emergency declaration under the [address] [place] [address] and the [employer] Act, this Virtual  Visit was conducted, with the patient's (and/or their legal guardian's) consent, to reduce the patient's risk of exposure to COVID-19 and provide necessary medical care. Services were provided through a video synchronous discussion virtually to substitute for an in-person clinic visit. The patient was located in their home. The provider was located in the [place] [address] office. Because of the COVID-19 epidemic all non-emergent diagnostic testing and the in-office visit will be delayed by several months to reduce the risk of patient exposure to and potential infection from the novel corona virus. This follow-up appointment may be delayed further if warranted by the status of the epidemic at that time. Orders to obtain laboratory testing will be mailed to the patient and obtained 1 week prior to the in-person appointment. [address] in 4 weeks.          Ni Moser, FNP-C  Liver [place] of [address], [address] [place] [address] Elyse Cartagena, [address] [facility]   [phone]

## 2022-03-18 PROBLEM — L03.90 CELLULITIS: Status: ACTIVE | Noted: 2018-07-24

## 2022-03-18 PROBLEM — R07.89 CHEST PRESSURE: Status: ACTIVE | Noted: 2019-01-30

## 2022-03-19 PROBLEM — R79.1 ELEVATED INR: Status: ACTIVE | Noted: 2019-01-30

## 2022-03-19 PROBLEM — K76.82 HEPATIC ENCEPHALOPATHY (HCC): Status: ACTIVE | Noted: 2019-02-01

## 2022-03-19 PROBLEM — M54.9 BACK PAIN: Status: ACTIVE | Noted: 2019-01-30

## 2022-03-19 PROBLEM — I63.9 CVA (CEREBRAL VASCULAR ACCIDENT) (HCC): Status: ACTIVE | Noted: 2019-01-30

## 2022-03-20 PROBLEM — Z86.19 HEPATITIS C VIRUS INFECTION CURED AFTER ANTIVIRAL DRUG THERAPY: Status: ACTIVE | Noted: 2020-01-01

## 2023-03-30 ENCOUNTER — DOCUMENTATION ONLY (OUTPATIENT)
Dept: HEMATOLOGY | Age: 68
End: 2023-03-30

## 2023-03-30 NOTE — PROGRESS NOTES
Updating [facility] database - Identified that patient  on 2020 at home per obituary.

## (undated) DEVICE — MAJ-1414 SINGLE USE ADPATER BIOPSY VALV: Brand: SINGLE USE ADAPTOR BIOPSY VALVE

## (undated) DEVICE — MOUTHPIECE ENDOSCP 20X27MM --

## (undated) DEVICE — ENDO CARRY-ON PROCEDURE KIT INCLUDES ENZYMATIC SPONGE, GAUZE, BIOHAZARD LABEL, TRAY, LUBRICANT, DIRTY SCOPE LABEL, WATER LABEL, TRAY, DRAWSTRING PAD, AND DEFENDO 4-PIECE KIT.: Brand: ENDO CARRY-ON PROCEDURE KIT

## (undated) DEVICE — SPONGE GZ W4XL4IN COT 12 PLY TYP VII WVN C FLD DSGN

## (undated) DEVICE — FORCEPS BX CAP 240CM L RAD JAW 4

## (undated) DEVICE — MEDI-VAC NON-CONDUCTIVE SUCTION TUBING: Brand: CARDINAL HEALTH

## (undated) DEVICE — TRAP SPEC COLL POLYP POLYSTYR --

## (undated) DEVICE — KENDALL RADIOLUCENT FOAM MONITORING ELECTRODE RECTANGULAR SHAPE: Brand: KENDALL

## (undated) DEVICE — SINGLE PORT MANIFOLD: Brand: NEPTUNE 2